# Patient Record
Sex: FEMALE | Race: WHITE | NOT HISPANIC OR LATINO | Employment: OTHER | URBAN - METROPOLITAN AREA
[De-identification: names, ages, dates, MRNs, and addresses within clinical notes are randomized per-mention and may not be internally consistent; named-entity substitution may affect disease eponyms.]

---

## 2017-01-13 ENCOUNTER — TELEPHONE (OUTPATIENT)
Dept: OPHTHALMOLOGY | Facility: CLINIC | Age: 67
End: 2017-01-13

## 2017-01-13 ENCOUNTER — OFFICE VISIT (OUTPATIENT)
Dept: OPHTHALMOLOGY | Facility: CLINIC | Age: 67
End: 2017-01-13
Payer: MEDICARE

## 2017-01-13 DIAGNOSIS — H52.7 REFRACTIVE ERROR: ICD-10-CM

## 2017-01-13 DIAGNOSIS — H25.13 NUCLEAR SCLEROSIS, BILATERAL: ICD-10-CM

## 2017-01-13 DIAGNOSIS — M31.6 GIANT CELL ARTERITIS: ICD-10-CM

## 2017-01-13 DIAGNOSIS — H43.811 PVD (POSTERIOR VITREOUS DETACHMENT), RIGHT: Primary | ICD-10-CM

## 2017-01-13 PROCEDURE — 99999 PR PBB SHADOW E&M-EST. PATIENT-LVL II: CPT | Mod: PBBFAC,,, | Performed by: OPHTHALMOLOGY

## 2017-01-13 PROCEDURE — 99212 OFFICE O/P EST SF 10 MIN: CPT | Mod: PBBFAC,PO | Performed by: OPHTHALMOLOGY

## 2017-01-13 PROCEDURE — 92014 COMPRE OPH EXAM EST PT 1/>: CPT | Mod: S$PBB,,, | Performed by: OPHTHALMOLOGY

## 2017-01-13 NOTE — PROGRESS NOTES
HPI     Urgent care requested Same Day access,   Started 2 weeks ago vision seems like she is looking thru cracked window   from right side of eye also with flashes of light right eye also black   spot , denies eye pain however od feels like it has a pressure to it. Does   not use any ey gtts currently.      Dx: Temperal Arteritis 2 years ago,  Not on Pred    Agree with above. Episodes lately - pressure in head, keeps building,   located R side lately, lasts couple days at least, nothing makes better or   worse. Reports scalp tenderness r side, denies jaw claudication. Denies   fever/weight changes. Pain other places - history of back and neck   surgeries. Couple weeks ago was sitting on porch with grandson, suddenly a   black spot appeared in eye like crack in windshield. Never went away but   has gotten better. Same night, saw a white light flash from upward to   downward, constant but sees predominantly at night.        Last edited by Syl Solomon MD on 1/13/2017  2:37 PM.     ROS     Negative for: Constitutional, Gastrointestinal, Neurological, Skin,   Genitourinary, Musculoskeletal, HENT, Endocrine, Cardiovascular, Eyes,   Respiratory, Psychiatric, Allergic/Imm, Heme/Lymph    Last edited by Syl Solomon MD on 1/13/2017  2:37 PM.   (History)        Assessment /Plan     For exam results, see Encounter Report.    PVD (posterior vitreous detachment), right    Nuclear sclerosis, bilateral    Giant cell arteritis    Refractive error          PVD (posterior vitreous detachment), right - Bella ring OD, no hole/tear noted, Mary sign negative. Re-check 4-6 weeks, sooner if symptoms worsen.    Nuclear sclerosis, bilateral - does not appear visually significant    Giant cell arteritis - Working to establish care - will send message to Dr. Erwin and Sandra Becker about perhaps ordering another ESR. Recent headaches/pressure.    Refractive error - MRx given, recommend wearing to  drive

## 2017-01-13 NOTE — Clinical Note
She is complaining of headache/pressure. States she has a history of giant cell arteritis confirmed on temporal artery biopsy. Should probably have an ESR. Eyes look fine.

## 2017-01-13 NOTE — MR AVS SNAPSHOT
[place] [facility]  [address]  [place] [address]  Phone: [phone]  Fax: [phone]                  Amanda Smith   2017 3:00 PM   Office Visit    Description:  Female : 1950   Provider:  Syl Solomon MD   Department:  [place] [facility]           Reason for Visit     Spots and/or Floaters           Diagnoses this Visit        Comments    PVD (posterior vitreous detachment), right    -  Primary     Nuclear sclerosis, bilateral         Giant cell arteritis         Refractive error                To Do List           Future Appointments        Provider Department Dept Phone    2017 3:00 PM Syl Solomon MD [facility] [phone]      Goals (5 Years of Data)     None      Follow-Up and Disposition     Return in about 4 weeks (around 2/10/2017) for 4-6 weeks DFE OD, f/u PVD.      [facility] On Call     [facility] Emergency Department Line - 24/7 Assistance  Registered nurses in the [facility] provide clinical advisement, health education, appointment booking, and other advisory services.  Call for this free service at [phone].             Medications           Message regarding Medications     Verify the changes and/or additions to your medication regime listed below are the same as discussed with your clinician today.  If any of these changes or additions are incorrect, please notify your healthcare provider.        STOP taking these medications     ranitidine (ZANTAC) 150 MG tablet TAKE 1/2 TABLET BY MOUTH TWICE A DAY AS NEEDED FOR HEARTBURN/UPSET STOMACH    predniSONE (DELTASONE) 20 MG tablet 2 tabs daily x 3 days, 1 tab daily x 3 days then 1/2 tab daily x 4 days    oxycodone-acetaminophen (PERCOCET)  mg per tablet     nitroGLYCERIN (NITROSTAT) 0.4 MG SL tablet Place 0.4 mg under the tongue every 5 (five) minutes as needed. Take 1 tablet if SBP >160    LYRICA 75 mg capsule Take 75 mg by mouth 2  (two) times daily.           Verify that the below list of medications is an accurate representation of the medications you are currently taking.  If none reported, the list may be blank. If incorrect, please contact your healthcare provider. Carry this list with you in case of emergency.           Current Medications     amlodipine (NORVASC) 5 MG tablet Take 2 tablets (10 mg total) by mouth once daily.    carvedilol (COREG) 12.5 MG tablet Take 1 tablet (12.5 mg total) by mouth 2 (two) times daily with meals.    gabapentin (NEURONTIN) 300 MG capsule     oxycodone (OXY-IR) 5 mg Cap Take 5 mg by mouth every 4 (four) hours as needed.    tizanidine (ZANAFLEX) 4 MG tablet every 8 (eight) hours.            Clinical Reference Information           Allergies as of 1/13/2017     Duloxetine    Ondansetron Hcl (Pf)    Sulfa (Sulfonamide Antibiotics)      Immunizations Administered on Date of Encounter - 1/13/2017     None      Smoking Cessation     If you would like to quit smoking:   You may be eligible for free services if you are a [place] resident and started smoking cigarettes before September 1, 1988.  Call the Smoking Cessation Trust (SCT) toll free at (585) 065-3547 or (483) 778-5435.   Call 6-934-QUIT-NOW if you do not meet the above criteria.

## 2017-01-13 NOTE — TELEPHONE ENCOUNTER
----- Message from Mayo Herrera sent at 1/13/2017  9:38 AM CST -----  Contact: self  Patient called in and last saw Dr. Solomon on 3/31/16.  Patient called in today 1/13 and stated she thinks she has a right inner eye bleed as she stated it looks like she is looking out of a cracked windshield.    Patient wanted to see if she could be squeezed in sooner than the next available appt which is end of February in the [place] area.    Patient call back number is [phone]

## 2017-01-17 ENCOUNTER — TELEPHONE (OUTPATIENT)
Dept: FAMILY MEDICINE | Facility: CLINIC | Age: 67
End: 2017-01-17

## 2017-01-17 DIAGNOSIS — R51.9 PRESSURE IN HEAD: Primary | ICD-10-CM

## 2017-01-17 NOTE — TELEPHONE ENCOUNTER
----- Message from Syl Solomon MD sent at 1/13/2017  2:41 PM CST -----  She is complaining of headache/pressure. States she has a history of giant cell arteritis confirmed on temporal artery biopsy. Should probably have an ESR. Eyes look fine.

## 2017-02-14 ENCOUNTER — TELEPHONE (OUTPATIENT)
Dept: OPHTHALMOLOGY | Facility: CLINIC | Age: 67
End: 2017-02-14

## 2017-03-12 DIAGNOSIS — I10 ESSENTIAL HYPERTENSION: ICD-10-CM

## 2017-03-12 RX ORDER — CARVEDILOL 12.5 MG/1
TABLET ORAL
Qty: 180 TABLET | Refills: 1 | Status: CANCELLED | OUTPATIENT
Start: 2017-03-12

## 2017-03-13 DIAGNOSIS — I10 ESSENTIAL HYPERTENSION: ICD-10-CM

## 2017-03-13 RX ORDER — AMLODIPINE BESYLATE 5 MG/1
TABLET ORAL
Qty: 180 TABLET | Refills: 1 | Status: CANCELLED | OUTPATIENT
Start: 2017-03-13

## 2017-03-20 DIAGNOSIS — I10 ESSENTIAL HYPERTENSION: ICD-10-CM

## 2017-03-22 RX ORDER — CARVEDILOL 12.5 MG/1
12.5 TABLET ORAL 2 TIMES DAILY WITH MEALS
Qty: 180 TABLET | Refills: 1 | Status: SHIPPED | OUTPATIENT
Start: 2017-03-22 | End: 2017-04-10 | Stop reason: SDUPTHER

## 2017-04-10 ENCOUNTER — OFFICE VISIT (OUTPATIENT)
Dept: FAMILY MEDICINE | Facility: CLINIC | Age: 67
End: 2017-04-10
Payer: MEDICARE

## 2017-04-10 ENCOUNTER — HOSPITAL ENCOUNTER (OUTPATIENT)
Dept: RADIOLOGY | Facility: HOSPITAL | Age: 67
Discharge: HOME | End: 2017-04-10
Attending: NURSE PRACTITIONER
Payer: MEDICARE

## 2017-04-10 VITALS
DIASTOLIC BLOOD PRESSURE: 80 MMHG | OXYGEN SATURATION: 97 % | TEMPERATURE: 99 F | HEIGHT: 63 IN | WEIGHT: 194 LBS | BODY MASS INDEX: 34.38 KG/M2 | HEART RATE: 84 BPM | SYSTOLIC BLOOD PRESSURE: 160 MMHG

## 2017-04-10 DIAGNOSIS — K21.9 GASTROESOPHAGEAL REFLUX DISEASE, ESOPHAGITIS PRESENCE NOT SPECIFIED: ICD-10-CM

## 2017-04-10 DIAGNOSIS — I10 ESSENTIAL HYPERTENSION: Primary | ICD-10-CM

## 2017-04-10 DIAGNOSIS — R73.01 ELEVATED FASTING BLOOD SUGAR: ICD-10-CM

## 2017-04-10 DIAGNOSIS — F17.200 SMOKER: ICD-10-CM

## 2017-04-10 DIAGNOSIS — G89.4 CHRONIC PAIN SYNDROME: ICD-10-CM

## 2017-04-10 DIAGNOSIS — M31.6 GIANT CELL ARTERITIS: ICD-10-CM

## 2017-04-10 DIAGNOSIS — D63.8 ANEMIA OF CHRONIC DISEASE: ICD-10-CM

## 2017-04-10 DIAGNOSIS — E66.9 OBESITY (BMI 30.0-34.9): ICD-10-CM

## 2017-04-10 DIAGNOSIS — N18.30 CKD (CHRONIC KIDNEY DISEASE) STAGE 3, GFR 30-59 ML/MIN: ICD-10-CM

## 2017-04-10 DIAGNOSIS — R05.9 COUGH: ICD-10-CM

## 2017-04-10 DIAGNOSIS — R19.7 DIARRHEA, UNSPECIFIED TYPE: ICD-10-CM

## 2017-04-10 DIAGNOSIS — G47.00 INSOMNIA, UNSPECIFIED TYPE: ICD-10-CM

## 2017-04-10 PROCEDURE — 71020 XR CHEST PA AND LATERAL: CPT | Mod: TC,PO

## 2017-04-10 PROCEDURE — 99999 PR PBB SHADOW E&M-EST. PATIENT-LVL IV: CPT | Mod: PBBFAC,,, | Performed by: NURSE PRACTITIONER

## 2017-04-10 PROCEDURE — 71020 XR CHEST PA AND LATERAL: CPT | Mod: 26,,, | Performed by: RADIOLOGY

## 2017-04-10 PROCEDURE — 99214 OFFICE O/P EST MOD 30 MIN: CPT | Mod: S$PBB,,, | Performed by: NURSE PRACTITIONER

## 2017-04-10 RX ORDER — ZOLPIDEM TARTRATE 5 MG/1
5 TABLET ORAL NIGHTLY PRN
Qty: 30 TABLET | Refills: 1 | Status: SHIPPED | OUTPATIENT
Start: 2017-04-10 | End: 2017-04-24

## 2017-04-10 RX ORDER — CARVEDILOL 12.5 MG/1
12.5 TABLET ORAL 2 TIMES DAILY WITH MEALS
Qty: 180 TABLET | Refills: 1 | Status: SHIPPED | OUTPATIENT
Start: 2017-04-10 | End: 2017-07-10 | Stop reason: SDUPTHER

## 2017-04-10 RX ORDER — OXYCODONE AND ACETAMINOPHEN 10; 325 MG/1; MG/1
1 TABLET ORAL 4 TIMES DAILY PRN
Refills: 0 | COMMUNITY
Start: 2017-03-28 | End: 2017-04-10

## 2017-04-10 RX ORDER — OXYCODONE AND ACETAMINOPHEN 10; 325 MG/1; MG/1
1 TABLET ORAL EVERY 4 HOURS PRN
COMMUNITY
End: 2018-03-01

## 2017-04-10 RX ORDER — AMLODIPINE BESYLATE 10 MG/1
10 TABLET ORAL DAILY
Qty: 90 TABLET | Refills: 1 | Status: SHIPPED | OUTPATIENT
Start: 2017-04-10 | End: 2018-03-12

## 2017-04-10 RX ORDER — OXYCODONE HYDROCHLORIDE 10 MG/1
10 TABLET, FILM COATED, EXTENDED RELEASE ORAL 2 TIMES DAILY
Refills: 0 | COMMUNITY
Start: 2017-01-28 | End: 2017-04-10

## 2017-04-10 NOTE — MR AVS SNAPSHOT
[facility]  [address]  [facility] [address]  Phone: [phone]  Fax: [phone]                  Amanda Smith   4/10/2017 10:40 AM   Office Visit    Description:  Female : 1950   Provider:  Reyna Weaver NP   Department:  [facility]           Reason for Visit     Diarrhea           Diagnoses this Visit        Comments    Essential hypertension    -  Primary     Diarrhea, unspecified type         CKD (chronic kidney disease) stage 3, GFR 30-59 ml/min         Obesity (BMI 30.0-34.9)         Anemia of chronic disease         Chronic pain syndrome         Gastroesophageal reflux disease, esophagitis presence not specified         Elevated fasting blood sugar         Insomnia, unspecified type         Giant cell arteritis         Cough         Smoker                To Do List           Future Appointments        Provider Department Dept Phone    4/10/2017 11:55 AM LAB, [place] [facility] [phone]    4/10/2017 3:45 PM [facility] XR2 [facility] [phone]    2017 10:00 AM Reyna Weaver NP [facility] [phone]      Goals (5 Years of Data)     None      Follow-Up and Disposition     Return in about 2 weeks (around 2017).       These Medications        Disp Refills Start End    ranitidine (ZANTAC) 150 MG tablet 180 tablet 3 4/10/2017 4/10/2018    Take 1 tablet (150 mg total) by mouth 2 (two) times daily. - Oral    Pharmacy: [facility]/pharmacy #[id number] [place] [address] Ph #: [phone]       carvedilol (COREG) 12.5 MG tablet 180 tablet 1 4/10/2017     Take 1 tablet (12.5 mg total) by mouth 2 (two) times daily with meals. - Oral    Pharmacy: [facility]/pharmacy #[id number] [place] [address] Ph #: [phone]       amlodipine (NORVASC) 10 MG tablet 90 tablet 1 4/10/2017 4/10/2018    Take 1 tablet (10 mg total) by mouth once daily. - Oral    Pharmacy:  [facility]/pharmacy #[id number]  [place] #: [phone]       zolpidem (AMBIEN) 5 MG Tab 30 tablet 1 4/10/2017 10/9/2017    Take 1 tablet (5 mg total) by mouth nightly as needed. - Oral    Pharmacy: [facility]/pharmacy #[id number]  [place] #: [phone]         [facility]     [facility] On Call Nurse Care Line - 24/7 Assistance  Unless otherwise directed by your provider, please contact [facility], our nurse care line that is available for 24/7 assistance.     Registered nurses in the [facility] provide: appointment scheduling, clinical advisement, health education, and other advisory services.  Call: [phone] (toll free)               Medications           Message regarding Medications     Verify the changes and/or additions to your medication regime listed below are the same as discussed with your clinician today.  If any of these changes or additions are incorrect, please notify your healthcare provider.        START taking these NEW medications        Refills    ranitidine (ZANTAC) 150 MG tablet 3    Sig: Take 1 tablet (150 mg total) by mouth 2 (two) times daily.    Class: Normal    Route: Oral    amlodipine (NORVASC) 10 MG tablet 1    Sig: Take 1 tablet (10 mg total) by mouth once daily.    Class: Normal    Route: Oral    zolpidem (AMBIEN) 5 MG Tab 1    Sig: Take 1 tablet (5 mg total) by mouth nightly as needed.    Class: Normal    Route: Oral      STOP taking these medications     oxycodone-acetaminophen (PERCOCET)  mg per tablet Take 1 tablet by mouth 4 (four) times daily as needed.    oxycodone (OXY-IR) 5 mg Cap Take 5 mg by mouth every 4 (four) hours as needed.    OXYCONTIN 10 mg 12 hr tablet Take 10 mg by mouth 2 (two) times daily.           Verify that the below list of medications is an accurate representation of the medications you are currently taking.  If none reported, the list may be blank. If incorrect, please contact your healthcare  "provider. Carry this list with you in case of emergency.           Current Medications     carvedilol (COREG) 12.5 MG tablet Take 1 tablet (12.5 mg total) by mouth 2 (two) times daily with meals.    gabapentin (NEURONTIN) 300 MG capsule     tizanidine (ZANAFLEX) 4 MG tablet every 8 (eight) hours.     amlodipine (NORVASC) 10 MG tablet Take 1 tablet (10 mg total) by mouth once daily.    ranitidine (ZANTAC) 150 MG tablet Take 1 tablet (150 mg total) by mouth 2 (two) times daily.    zolpidem (AMBIEN) 5 MG Tab Take 1 tablet (5 mg total) by mouth nightly as needed.           Clinical Reference Information           Your Vitals Were     BP Pulse Temp Height Weight SpO2    160/80 84 98.8 °F (37.1 °C) (Oral) 5' 3" (1.6 m) 88 kg (194 lb 0.1 oz) 97%    BMI                34.37 kg/m2          Blood Pressure          Most Recent Value    BP  (!)  160/80      Allergies as of 4/10/2017     Duloxetine    Ondansetron Hcl (Pf)    Sulfa (Sulfonamide Antibiotics)      Immunizations Administered on Date of Encounter - 4/10/2017     None      Orders Placed During Today's Visit      Normal Orders This Visit    Ambulatory referral to Gastroenterology     Microalbumin/creatinine urine ratio     Future Labs/Procedures Expected by Expires    CBC auto differential  4/10/2017 6/9/2018    Clostridium difficile EIA  4/10/2017 6/9/2018    Comprehensive metabolic panel  4/10/2017 6/9/2018    CULTURE, STOOL  4/10/2017 4/10/2018    Giardia / Cryptosporidum, EIA  4/10/2017 6/9/2018    Hemoglobin A1c  4/10/2017 6/9/2018    Sedimentation rate, manual  4/10/2017 6/9/2018    Stool Exam-Ova,Cysts,Parasites  4/10/2017 4/10/2018    X-Ray Chest PA And Lateral  4/10/2017 4/10/2018      Smoking Cessation     If you would like to quit smoking:   You may be eligible for free services if you are a [place] resident and started smoking cigarettes before September 1, 1988.  Call the Smoking Cessation Trust (SCT) toll free at (977) 876-4727 or (680) " 887-1694.   Call 1-800-QUIT-NOW if you do not meet the above criteria.   Contact us via email: [email or web address]   View our website for more information: [email or web address]        Language Assistance Services     ATTENTION: Language assistance services are available, free of charge. Please call [phone].      ATENCIÓN: Si habla español, tiene a bal disposición servicios gratuitos de asistencia lingüística. Llame al [phone].     CHÚ Ý: N?u b?n nói Ti?ng Vi?t, có các d?ch v? h? tr? ngôn ng? mi?n phí dành cho b?n. G?i s? [phone].         [facility] complies with applicable Federal civil rights laws and does not discriminate on the basis of race, color, national origin, age, disability, or sex.

## 2017-04-10 NOTE — PROGRESS NOTES
Subjective:       Patient ID: Amanda Smith is a 66 y.o. female.    Chief Complaint: Diarrhea (vomitting and diarrhea since 2 fridays ago. Really bad cough for 5 weeks,phlem (green) coming up. States everybody in her house had the flu and URI. Needs med refills too)    HPI Comments: Third time this year that she has had vomiting and nausea and diarrhea- 3 times with these episodes since April   Then they stop and everything goes back to normal - she has weakness with all these episodes - but she is able to eat during these epiosdes with bland foods   Takes pepto - has had black stools with this   Takes few days to pass        Had taken amoxicillin - BID for 7 days - 3 weeks ago     Reviewed EGD 2008 - Erosive gastritis and esophagitis, duodenitis   Colonoscopy 2008 - polyp and internal hemorrhoids - reviewed           Diarrhea    This is a new problem. The current episode started 1 to 4 weeks ago (went for 7 days - then stopped 3 days ago ). The problem occurs more than 10 times per day. The problem has been rapidly worsening. The stool consistency is described as watery. Associated symptoms include abdominal pain, coughing, a fever, a URI and vomiting. Pertinent negatives include no arthralgias, bloating, chills, headaches, increased  flatus, myalgias, sweats or weight loss. Associated symptoms comments: 99.5. Risk factors include ill contacts. There is no history of bowel resection, inflammatory bowel disease, irritable bowel syndrome, malabsorption, a recent abdominal surgery or short gut syndrome.   Cough   This is a new problem. The current episode started more than 1 month ago. The problem has been gradually worsening. The cough is productive of sputum and productive of purulent sputum. Associated symptoms include a fever and shortness of breath. Pertinent negatives include no chest pain, chills, ear congestion, ear pain, headaches, heartburn, hemoptysis, myalgias, nasal congestion, postnasal drip,  rash, rhinorrhea, sore throat, sweats, weight loss or wheezing. Treatments tried: cough drops, cough meds. The treatment provided no relief. There is no history of asthma, bronchiectasis, bronchitis, COPD, emphysema, environmental allergies or pneumonia.     Vitals:    04/10/17 1042   BP: (!) 160/80   Pulse: 84   Temp: 98.8 °F (37.1 °C)     Review of Systems   Constitutional: Positive for fever. Negative for chills and weight loss.   HENT: Positive for congestion. Negative for ear pain, postnasal drip, rhinorrhea and sore throat.    Eyes: Negative.    Respiratory: Positive for cough and shortness of breath. Negative for hemoptysis and wheezing.    Cardiovascular: Negative.  Negative for chest pain.   Gastrointestinal: Positive for abdominal pain, diarrhea, nausea and vomiting. Negative for bloating, blood in stool, flatus and heartburn.   Endocrine: Negative.    Genitourinary: Negative.    Musculoskeletal: Negative.  Negative for arthralgias and myalgias.   Skin: Negative for rash.   Allergic/Immunologic: Negative.  Negative for environmental allergies.   Neurological: Negative.  Negative for headaches.   Psychiatric/Behavioral: Negative.        Past Medical History:   Diagnosis Date    Anemia of chronic disease 11/30/2015    Anxiety     Blood transfusion     Cataract     Chronic kidney disease     Depression     Fibromyalgia     GERD (gastroesophageal reflux disease)     Hypertension     Kidney stones     Renal failure     Temporal arteritis      Objective:      Physical Exam   Constitutional: She is oriented to person, place, and time. She appears well-developed and well-nourished.   HENT:   Head: Normocephalic and atraumatic.   Mouth/Throat: Oropharynx is clear and moist.   Eyes: Conjunctivae and EOM are normal. Pupils are equal, round, and reactive to light.   Neck: Neck supple.   Cardiovascular: Normal rate, regular rhythm, normal heart sounds and intact distal pulses.  Exam reveals no friction rub.     No murmur heard.  Pulmonary/Chest: Effort normal. No respiratory distress. She has decreased breath sounds in the right lower field and the left lower field. She has no wheezes.   Abdominal: Soft. Bowel sounds are normal. She exhibits no distension.   Musculoskeletal: Normal range of motion.   Neurological: She is alert and oriented to person, place, and time.   Skin: Skin is warm and dry.   Psychiatric: She has a normal mood and affect. Her behavior is normal. Judgment and thought content normal.   Nursing note and vitals reviewed.      Assessment:       1. Essential hypertension    2. Diarrhea, unspecified type    3. CKD (chronic kidney disease) stage 3, GFR 30-59 ml/min    4. Obesity (BMI 30.0-34.9)    5. Anemia of chronic disease    6. Chronic pain syndrome    7. Gastroesophageal reflux disease, esophagitis presence not specified    8. Elevated fasting blood sugar    9. Insomnia, unspecified type    10. Giant cell arteritis        Plan:       Essential hypertension  -     CBC auto differential; Future; Expected date: 4/10/17  -     Comprehensive metabolic panel; Future; Expected date: 4/10/17  -     Microalbumin/creatinine urine ratio  -     carvedilol (COREG) 12.5 MG tablet; Take 1 tablet (12.5 mg total) by mouth 2 (two) times daily with meals.  Dispense: 180 tablet; Refill: 1  -     amlodipine (NORVASC) 10 MG tablet; Take 1 tablet (10 mg total) by mouth once daily.  Dispense: 90 tablet; Refill: 1    Diarrhea, unspecified type  -     Giardia / Cryptosporidum, EIA; Future; Expected date: 4/10/17  -     CULTURE, STOOL; Future; Expected date: 4/10/17  -     Stool Exam-Ova,Cysts,Parasites; Future; Expected date: 4/10/17  -     Clostridium difficile EIA; Future; Expected date: 4/10/17  -     Ambulatory referral to Gastroenterology    CKD (chronic kidney disease) stage 3, GFR 30-59 ml/min  -     Comprehensive metabolic panel; Future; Expected date: 4/10/17  -     Microalbumin/creatinine urine ratio    Obesity (BMI  30.0-34.9)  -     CBC auto differential; Future; Expected date: 4/10/17  -     Microalbumin/creatinine urine ratio    Anemia of chronic disease  -     CBC auto differential; Future; Expected date: 4/10/17    Chronic pain syndrome  Taking oxycodone - per pain mgt #120 per month - per  report - she states that she uses 2 a day    Gastroesophageal reflux disease, esophagitis presence not specified  -     ranitidine (ZANTAC) 150 MG tablet; Take 1 tablet (150 mg total) by mouth 2 (two) times daily.  Dispense: 180 tablet; Refill: 3    Elevated fasting blood sugar  -     Hemoglobin A1c; Future; Expected date: 4/10/17    Insomnia, unspecified type  -     zolpidem (AMBIEN) 5 MG Tab; Take 1 tablet (5 mg total) by mouth nightly as needed.  Dispense: 30 tablet; Refill: 1    Giant cell arteritis  -     Sedimentation rate, manual; Future; Expected date: 4/10/17          Return in about 2 weeks (around 4/24/2017).

## 2017-04-12 ENCOUNTER — TELEPHONE (OUTPATIENT)
Dept: FAMILY MEDICINE | Facility: CLINIC | Age: 67
End: 2017-04-12

## 2017-04-12 DIAGNOSIS — N28.9 WORSENING RENAL FUNCTION: Primary | ICD-10-CM

## 2017-04-12 DIAGNOSIS — N18.9 CKD (CHRONIC KIDNEY DISEASE), UNSPECIFIED STAGE: ICD-10-CM

## 2017-04-12 NOTE — TELEPHONE ENCOUNTER
Informed patient of results. Patient would like to see Dr Agus Weathers and will call back for referral if she is able to make appointment, if not she will see Dr Valente.

## 2017-04-12 NOTE — TELEPHONE ENCOUNTER
Please set her up with renal doc- her kidney function is worsening and needs to be monitored   Ask her if she is going to bring in stool samples that I ordered     She is seeing me in 2 weeks and then needs to reestablish with new pcp in 2 months for follow up

## 2017-04-13 ENCOUNTER — TELEPHONE (OUTPATIENT)
Dept: FAMILY MEDICINE | Facility: CLINIC | Age: 67
End: 2017-04-13

## 2017-04-13 NOTE — TELEPHONE ENCOUNTER
----- Message from Cathy Rebollar sent at 4/13/2017 11:05 AM CDT -----  Patient is calling to get a name for the doctor in Nephrology that office wants her to see. Please call back at [phone].

## 2017-04-13 NOTE — TELEPHONE ENCOUNTER
----- Message from Vida Tafoya sent at 4/13/2017  9:04 AM CDT -----  Pt called stated that she need a referral letter to a Nephrologist/pls call back at [phone]

## 2017-04-13 NOTE — TELEPHONE ENCOUNTER
Informed patient referral entered.   She states Dr. Weathers was recommended to her but she will also see Dr. Valente if needed. Pt to contact Dr. Weathers's office to schedule.

## 2017-04-18 ENCOUNTER — TELEPHONE (OUTPATIENT)
Dept: FAMILY MEDICINE | Facility: CLINIC | Age: 67
End: 2017-04-18

## 2017-04-18 NOTE — TELEPHONE ENCOUNTER
----- Message from Vida Tafoya sent at 4/18/2017  8:14 AM CDT -----  Pt called stated that she's suppose to bring a specimen and wanted to know if its ok if she brings it om 4/24 when she has an appt because she's not able to drive right now/pls call back at [phone]

## 2017-04-20 ENCOUNTER — TELEPHONE (OUTPATIENT)
Dept: FAMILY MEDICINE | Facility: CLINIC | Age: 67
End: 2017-04-20

## 2017-04-20 DIAGNOSIS — R11.2 NAUSEA AND VOMITING, INTRACTABILITY OF VOMITING NOT SPECIFIED, UNSPECIFIED VOMITING TYPE: Primary | ICD-10-CM

## 2017-04-20 RX ORDER — PROMETHAZINE HYDROCHLORIDE 12.5 MG/1
12.5 TABLET ORAL EVERY 8 HOURS PRN
Qty: 8 TABLET | Refills: 0 | Status: SHIPPED | OUTPATIENT
Start: 2017-04-20 | End: 2017-05-23

## 2017-04-20 NOTE — TELEPHONE ENCOUNTER
Patient states she began vomiting last night and today, she thnks it is a virus. States she is urinating. States in the past when she gets sick with vomiting and diarrhea she ends up getting dehydrated and gets placed in hospital from issues with her kidneys . Says she has taken phenergan in the past.

## 2017-04-20 NOTE — TELEPHONE ENCOUNTER
I saw her for diarrhea - dont see any documentation or discussion of nausea/vomitting - and there is nothing on her med list for this   Please call her and ask about why she feels that she will get dehydrated  Is she tolerating fluids  Is she voiding   What does she take for nausea

## 2017-04-20 NOTE — TELEPHONE ENCOUNTER
----- Message from Marybeth Cartwright sent at 4/20/2017  9:06 AM CDT -----  Contact: patient  Patient calling to request medication for nausea. She is scared that she may start vomiting and be dehydrated again. She is allergic to Zofran.  Please advise.  Call back .  Thanks!  [facility]/pharmacy #[id number] - [place] - [address] [address]  [address] [address]  Phone: [phone] Fax: [phone]

## 2017-04-24 ENCOUNTER — OFFICE VISIT (OUTPATIENT)
Dept: NEPHROLOGY | Facility: CLINIC | Age: 67
End: 2017-04-24
Payer: MEDICARE

## 2017-04-24 ENCOUNTER — LAB VISIT (OUTPATIENT)
Dept: LAB | Facility: HOSPITAL | Age: 67
End: 2017-04-24
Attending: NURSE PRACTITIONER
Payer: MEDICARE

## 2017-04-24 VITALS
OXYGEN SATURATION: 98 % | BODY MASS INDEX: 33.91 KG/M2 | DIASTOLIC BLOOD PRESSURE: 79 MMHG | HEIGHT: 63 IN | HEART RATE: 78 BPM | SYSTOLIC BLOOD PRESSURE: 164 MMHG | WEIGHT: 191.38 LBS | TEMPERATURE: 99 F

## 2017-04-24 DIAGNOSIS — R19.7 DIARRHEA, UNSPECIFIED TYPE: ICD-10-CM

## 2017-04-24 DIAGNOSIS — N18.30 CKD (CHRONIC KIDNEY DISEASE) STAGE 3, GFR 30-59 ML/MIN: Primary | ICD-10-CM

## 2017-04-24 PROCEDURE — 87046 STOOL CULTR AEROBIC BACT EA: CPT | Mod: 59

## 2017-04-24 PROCEDURE — 99204 OFFICE O/P NEW MOD 45 MIN: CPT | Mod: S$PBB,,, | Performed by: INTERNAL MEDICINE

## 2017-04-24 PROCEDURE — 87427 SHIGA-LIKE TOXIN AG IA: CPT

## 2017-04-24 PROCEDURE — 87329 GIARDIA AG IA: CPT

## 2017-04-24 PROCEDURE — 87209 SMEAR COMPLEX STAIN: CPT

## 2017-04-24 PROCEDURE — 87045 FECES CULTURE AEROBIC BACT: CPT

## 2017-04-24 PROCEDURE — 99999 PR PBB SHADOW E&M-EST. PATIENT-LVL III: CPT | Mod: PBBFAC,,, | Performed by: INTERNAL MEDICINE

## 2017-04-24 RX ORDER — MORPHINE SULFATE 15 MG/1
15 TABLET, FILM COATED, EXTENDED RELEASE ORAL 2 TIMES DAILY
Refills: 0 | COMMUNITY
Start: 2017-04-10 | End: 2017-07-10

## 2017-04-24 NOTE — LETTER
April 24, 2017      Reyna Weaver, NP  [address] [facility] [address] [place] [address]           [place] [facility]  [address] [place] [address]  Phone: [phone]          Patient: Amanda Smith   MR Number: [id number]   YOB: 1950   Date of Visit: 4/24/2017       Dear Reyna Weaver:    Thank you for referring Amanda Smith to me for evaluation. Attached you will find relevant portions of my assessment and plan of care.    If you have questions, please do not hesitate to call me. I look forward to following Amanda Smith along with you.    Sincerely,    Shailesh Valente MD    Enclosure  CC:  No Recipients    If you would like to receive this communication electronically, please contact [email or web address] or [phone] to request more information on [facility] Link access.    For providers and/or their staff who would like to refer a patient to [facility], please contact us through our one-stop-shop provider referral line, [facility], at [phone].    If you feel you have received this communication in error or would no longer like to receive these types of communications, please e-mail [email or web address]

## 2017-04-24 NOTE — MR AVS SNAPSHOT
[facility]  [address] [facility] [address]  [facility] [address]  Phone: [phone]                  Amanda Smith   2017 2:40 PM   Office Visit    Description:  Female : 1950   Provider:  Shailesh Valente MD   Department:  [facility]           Diagnoses this Visit        Comments    CKD (chronic kidney disease) stage 3, GFR 30-59 ml/min    -  Primary            To Do List           Future Appointments        Provider Department Dept Phone    2017 10:50 AM URINE [facility] [phone]    2017 10:55 AM LAB, [place] [facility] [phone]    2017 11:30 AM NSMH US2 [facility] [phone]    2017 2:00 PM Reyna Weaver NP [facility] [phone]    2017 2:00 PM Shailesh Valente MD [facility] [phone]      Goals (5 Years of Data)     None      Follow-Up and Disposition     Return in about 4 months (around 2017).    Follow-up and Disposition History      [facility]     [facility] Nurse Care Line -  Assistance  Unless otherwise directed by your provider, please contact [facility], our nurse care line that is available for  assistance.     Registered nurses in the [facility] provide: appointment scheduling, clinical advisement, health education, and other advisory services.  Call: [phone] (toll free)               Medications           Message regarding Medications     Verify the changes and/or additions to your medication regime listed below are the same as discussed with your clinician today.  If any of these changes or additions are incorrect, please notify your healthcare provider.        STOP taking these medications     ranitidine (ZANTAC) 150 MG tablet Take 1 tablet (150 mg total) by mouth 2 (two) times daily.    zolpidem (AMBIEN) 5 MG Tab Take 1 tablet (5 mg total) by mouth nightly as needed.          "  Verify that the below list of medications is an accurate representation of the medications you are currently taking.  If none reported, the list may be blank. If incorrect, please contact your healthcare provider. Carry this list with you in case of emergency.           Current Medications     amlodipine (NORVASC) 10 MG tablet Take 1 tablet (10 mg total) by mouth once daily.    carvedilol (COREG) 12.5 MG tablet Take 1 tablet (12.5 mg total) by mouth 2 (two) times daily with meals.    gabapentin (NEURONTIN) 300 MG capsule Take 600 mg by mouth 3 (three) times daily.     morphine (MS CONTIN) 15 MG 12 hr tablet Take 15 mg by mouth 2 (two) times daily.    oxycodone-acetaminophen (PERCOCET)  mg per tablet Take 1 tablet by mouth every 4 (four) hours as needed for Pain.    promethazine (PHENERGAN) 12.5 MG Tab Take 1 tablet (12.5 mg total) by mouth every 8 (eight) hours as needed.    tizanidine (ZANAFLEX) 4 MG tablet every 8 (eight) hours.            Clinical Reference Information           Your Vitals Were     BP Pulse Temp Height Weight SpO2    164/79 78 98.5 °F (36.9 °C) 5' 3" (1.6 m) 86.8 kg (191 lb 5.8 oz) 98%    BMI                33.9 kg/m2          Blood Pressure          Most Recent Value    BP  (!)  164/79      Allergies as of 4/24/2017     Duloxetine    Ondansetron Hcl (Pf)    Sulfa (Sulfonamide Antibiotics)      Immunizations Administered on Date of Encounter - 4/24/2017     None      Orders Placed During Today's Visit     Future Labs/Procedures Expected by Expires    Protein / creatinine ratio, urine  5/1/2017 12/31/2017    PTH, intact  5/1/2017 12/31/2017    Renal function panel  5/1/2017 12/31/2017    Urinalysis  5/1/2017 12/31/2017    US Retroperitoneal Complete (Kidney and  5/1/2017 12/31/2017      Smoking Cessation     If you would like to quit smoking:   You may be eligible for free services if you are a [place] resident and started smoking cigarettes before September 1, 1988.  Call the Smoking " [facility] ([facility]) toll free at (039) [phone] or (249) 246-5151.   Call 1-800-QUIT-NOW if you do not meet the above criteria.   Contact us via email: [email or web address]   View our website for more information: [email or web address]        Language Assistance Services     ATTENTION: Language assistance services are available, free of charge. Please call [phone].      ATENCIÓN: Si habla allisonañol, tiene a bal disposición servicios gratuitos de asistencia lingüística. Llame al [phone].     CHÚ Ý: N?u b?n nói Ti?ng Vi?t, có các d?ch v? h? tr? ngôn ng? mi?n phí dành cho b?n. G?i s? [phone].         [facility] complies with applicable Federal civil rights laws and does not discriminate on the basis of race, color, national origin, age, disability, or sex.

## 2017-04-24 NOTE — MR AVS SNAPSHOT
[facility]  [address]  [facility] [address]  Phone: [phone]                  Amanda Smith   2017 2:40 PM   Office Visit    Description:  Female : 1950   Provider:  Shailesh Valente MD   Department:  [facility]                To Do List           Future Appointments        Provider Department Dept Phone    2017 3:20 PM [facility], [place] [facility] [phone]    2017 10:00 AM Reyna Weaver NP [facility] [phone]      Goals (5 Years of Data)     None      [facility]     [facility] -  Assistance  Registered nurses in the [facility] provide clinical advisement, health education, appointment booking, and other advisory services  Call for this free service at [phone].                 Medications           Message regarding Medications     Verify the changes and/or additions to your medication regime listed below are the same as discussed with your clinician today.  If any of these changes or additions are incorrect, please notify your healthcare provider.        STOP taking these medications     ranitidine (ZANTAC) 150 MG tablet Take 1 tablet (150 mg total) by mouth 2 (two) times daily.    zolpidem (AMBIEN) 5 MG Tab Take 1 tablet (5 mg total) by mouth nightly as needed.           Verify that the below list of medications is an accurate representation of the medications you are currently taking.  If none reported, the list may be blank. If incorrect, please contact your healthcare provider. Carry this list with you in case of emergency.           Current Medications     amlodipine (NORVASC) 10 MG tablet Take 1 tablet (10 mg total) by mouth once daily.    carvedilol (COREG) 12.5 MG tablet Take 1 tablet (12.5 mg total) by mouth 2 (two) times daily with meals.    gabapentin (NEURONTIN) 300 MG capsule Take 600 mg by mouth 3 (three) times daily.     morphine  "(MS CONTIN) 15 MG 12 hr tablet Take 15 mg by mouth 2 (two) times daily.    oxycodone-acetaminophen (PERCOCET)  mg per tablet Take 1 tablet by mouth every 4 (four) hours as needed for Pain.    promethazine (PHENERGAN) 12.5 MG Tab Take 1 tablet (12.5 mg total) by mouth every 8 (eight) hours as needed.    tizanidine (ZANAFLEX) 4 MG tablet every 8 (eight) hours.            Clinical Reference Information           Vital Signs - Last Recorded  Most recent update: 4/24/2017  3:05 PM by Hoa Benito    BP Pulse Temp Ht Wt SpO2    (!) 190/86 (BP Location: Right arm, Patient Position: Sitting) 78 98.5 °F (36.9 °C) 5' 3" (1.6 m) 86.8 kg (191 lb 5.8 oz) 98%    BMI                33.9 kg/m2          Blood Pressure          Most Recent Value    BP  (!)  190/86      Allergies as of 4/24/2017     Duloxetine    Ondansetron Hcl (Pf)    Sulfa (Sulfonamide Antibiotics)      Immunizations Administered on Date of Encounter - 4/24/2017     None      Translation Services Information     ATTENTION: Language assistance services are available, free of charge. Please call [phone].    ATENCIÓN: Si ezio lobo, tiene a bal disposición servicios gratuitos de asistencia lingüística. Llame al [phone].     CHÚ Ý: N?u b?n nói Ti?ng Vi?t, có các d?ch v? h? tr? ngôn ng? mi?n phí dành cho b?n. G?i s? [phone].        Smoking Cessation     If you would like to quit smoking:   You may be eligible for free services if you are a [place] resident and started smoking cigarettes before September 1, 1988.  Call the Smoking [employer]essation [employer] ([employer]) toll free at (728) 500-8432 or (121) 118-7419.   Call 1-800-QUIT-NOW if you do not meet the above criteria.            [facility] complies with applicable Federal civil rights laws and does not discriminate on the basis of race, color, national origin, age, disability, or sex.        "

## 2017-04-24 NOTE — PROGRESS NOTES
Subjective:       Patient ID: Amanda Smith is a 66 y.o. White female who presents for new patient evaluation for chronic renal failure.    Amanda Smith is referred by Castillo Erwin MD to be evaluated for chronic renal failure.  She gives a history of knowing that she has a history of renal failure and has been on dialysis for four treatments in the past.  She has had fairly stable kidney function for the past several months.  She report that she had been taking aleve on a very regular basis      Review of Systems   Constitutional: Positive for appetite change, fatigue and unexpected weight change. Negative for chills and fever.   Eyes: Negative for visual disturbance.   Respiratory: Positive for shortness of breath. Negative for cough.    Cardiovascular: Positive for leg swelling. Negative for chest pain.   Gastrointestinal: Positive for abdominal pain. Negative for diarrhea, nausea and vomiting.   Genitourinary: Positive for decreased urine volume. Negative for difficulty urinating, dysuria and hematuria.   Musculoskeletal: Positive for arthralgias and back pain.   Skin: Positive for color change. Negative for rash.   Neurological: Positive for headaches.   Hematological: Bruises/bleeds easily.   Psychiatric/Behavioral: Positive for sleep disturbance.         Past Medical History:   Diagnosis Date    Anemia of chronic disease 2015    Anxiety     Blood transfusion     Cataract     Chronic kidney disease     Depression     Fibromyalgia     GERD (gastroesophageal reflux disease)     Hypertension     Kidney stones     Renal failure     Temporal arteritis      Past Surgical History:   Procedure Laterality Date    BACK SURGERY      x5     Brest tumor removal      cervical fusion\       SECTION      CHOLECYSTECTOMY      HYSTERECTOMY      MRSA      neck and arm - mosquito bite      Social History     Social History    Marital status:      Spouse name: N/A     "Number of children: N/A    Years of education: N/A     Occupational History    Not on file.     Social History Main Topics    Smoking status: Smoker, Current Status Unknown     Packs/day: 1.00     Years: 45.00     Types: Cigarettes    Smokeless tobacco: Never Used      Comment: 1 pack a week     Alcohol use No    Drug use: No    Sexual activity: Not Currently     Other Topics Concern    Not on file     Social History Narrative     The patient has a family history of  Current Outpatient Prescriptions   Medication Sig    amlodipine (NORVASC) 10 MG tablet Take 1 tablet (10 mg total) by mouth once daily.    carvedilol (COREG) 12.5 MG tablet Take 1 tablet (12.5 mg total) by mouth 2 (two) times daily with meals.    gabapentin (NEURONTIN) 300 MG capsule Take 600 mg by mouth 3 (three) times daily.     morphine (MS CONTIN) 15 MG 12 hr tablet Take 15 mg by mouth 2 (two) times daily.    oxycodone-acetaminophen (PERCOCET)  mg per tablet Take 1 tablet by mouth every 4 (four) hours as needed for Pain.    promethazine (PHENERGAN) 12.5 MG Tab Take 1 tablet (12.5 mg total) by mouth every 8 (eight) hours as needed.    tizanidine (ZANAFLEX) 4 MG tablet every 8 (eight) hours.      No current facility-administered medications for this visit.        BP (!) 164/79  Pulse 78  Temp 98.5 °F (36.9 °C)  Ht 5' 3" (1.6 m)  Wt 86.8 kg (191 lb 5.8 oz)  SpO2 98%  BMI 33.9 kg/m2    Objective:      Physical Exam   Constitutional: She is oriented to person, place, and time. She appears well-developed and well-nourished. No distress.   HENT:   Head: Normocephalic and atraumatic.   Eyes: Conjunctivae are normal.   Neck: Neck supple. No JVD present.   Cardiovascular: Normal rate, regular rhythm and normal heart sounds.  Exam reveals no gallop and no friction rub.    No murmur heard.  Pulmonary/Chest: Effort normal and breath sounds normal. No respiratory distress. She has no wheezes. She has no rales.   Abdominal: Soft. Bowel " sounds are normal. She exhibits no distension (obese). There is no tenderness.   Musculoskeletal: She exhibits no edema.   Neurological: She is alert and oriented to person, place, and time.   Skin: Skin is warm and dry. No rash noted.   Psychiatric: She has a normal mood and affect.   Vitals reviewed.      Assessment:       1. CKD (chronic kidney disease) stage 3, GFR 30-59 ml/min        Plan:   Return to clinic in 4 months.  Labs for next week include rp, pth, ua, upc, renal panel with PVR.  Baseline creatinine is 1.7 since 2012.  Please avoid or minimize all NSAIDS (ibuprofen, motrin, aleve, indocin, naprosyn) to minimize the risk to your kidneys.  Aspirin in a dose of 325 mg or less a day is likely the safest with regards to kidney function.  If you are able to take aspirin and do not have any bleeding problems or ulcers, this may be your best therapy.  Alternatively, acetaminophen (Tylenol) is the safer than NSAIDs with regards to kidney function.  I would ask you take this as directed on the bottle.  It is best to stay under 2 grams a day (4-500 mg tablets a day maximum).  She has multiple symptoms c/w advanced CKD but has reasonable function with stable function.  I will do a directed evaluation and reassess her.

## 2017-04-25 LAB
CRYPTOSP AG STL QL IA: NEGATIVE
E COLI SXT1 STL QL IA: NEGATIVE
E COLI SXT2 STL QL IA: NEGATIVE
G LAMBLIA AG STL QL IA: NEGATIVE
O+P STL TRI STN: NORMAL

## 2017-04-27 LAB — BACTERIA STL CULT: NORMAL

## 2017-05-23 ENCOUNTER — HOSPITAL ENCOUNTER (OUTPATIENT)
Dept: RADIOLOGY | Facility: HOSPITAL | Age: 67
Discharge: HOME | End: 2017-05-23
Attending: INTERNAL MEDICINE
Payer: MEDICARE

## 2017-05-23 ENCOUNTER — OFFICE VISIT (OUTPATIENT)
Dept: FAMILY MEDICINE | Facility: CLINIC | Age: 67
End: 2017-05-23
Payer: MEDICARE

## 2017-05-23 VITALS
BODY MASS INDEX: 33.94 KG/M2 | OXYGEN SATURATION: 99 % | DIASTOLIC BLOOD PRESSURE: 90 MMHG | TEMPERATURE: 98 F | SYSTOLIC BLOOD PRESSURE: 160 MMHG | HEIGHT: 63 IN | WEIGHT: 191.56 LBS | HEART RATE: 65 BPM

## 2017-05-23 DIAGNOSIS — E66.9 OBESITY (BMI 30.0-34.9): ICD-10-CM

## 2017-05-23 DIAGNOSIS — G47.00 INSOMNIA, UNSPECIFIED TYPE: ICD-10-CM

## 2017-05-23 DIAGNOSIS — R11.15 INTRACTABLE CYCLICAL VOMITING WITH NAUSEA: Primary | ICD-10-CM

## 2017-05-23 DIAGNOSIS — G89.4 CHRONIC PAIN SYNDROME: ICD-10-CM

## 2017-05-23 DIAGNOSIS — N18.30 CKD (CHRONIC KIDNEY DISEASE) STAGE 3, GFR 30-59 ML/MIN: ICD-10-CM

## 2017-05-23 DIAGNOSIS — N18.9 CKD (CHRONIC KIDNEY DISEASE), UNSPECIFIED STAGE: ICD-10-CM

## 2017-05-23 DIAGNOSIS — I10 ESSENTIAL HYPERTENSION: ICD-10-CM

## 2017-05-23 DIAGNOSIS — R19.7 DIARRHEA, UNSPECIFIED TYPE: ICD-10-CM

## 2017-05-23 PROCEDURE — 99999 PR PBB SHADOW E&M-EST. PATIENT-LVL IV: CPT | Mod: PBBFAC,,, | Performed by: NURSE PRACTITIONER

## 2017-05-23 PROCEDURE — 99214 OFFICE O/P EST MOD 30 MIN: CPT | Mod: PBBFAC,25,PO | Performed by: NURSE PRACTITIONER

## 2017-05-23 PROCEDURE — 76770 US EXAM ABDO BACK WALL COMP: CPT | Mod: 26,,, | Performed by: RADIOLOGY

## 2017-05-23 PROCEDURE — 99214 OFFICE O/P EST MOD 30 MIN: CPT | Mod: S$PBB,,, | Performed by: NURSE PRACTITIONER

## 2017-05-23 RX ORDER — OMEPRAZOLE 20 MG/1
20 CAPSULE, DELAYED RELEASE ORAL DAILY
Qty: 30 CAPSULE | Refills: 11 | Status: SHIPPED | OUTPATIENT
Start: 2017-05-23 | End: 2017-07-10 | Stop reason: DRUGHIGH

## 2017-05-23 RX ORDER — ZOLPIDEM TARTRATE 5 MG/1
5 TABLET ORAL NIGHTLY PRN
Qty: 30 TABLET | Refills: 0 | Status: SHIPPED | OUTPATIENT
Start: 2017-05-23 | End: 2017-06-19 | Stop reason: SDUPTHER

## 2017-05-23 NOTE — PROGRESS NOTES
Subjective:       Patient ID: Amanda Smith is a 66 y.o. female.    Chief Complaint: Hypertension (2 week follow up. Patient states feeling better)    Dr Tobar - Pain mgt -  taking 2 percocet/ day for chronic pain     Taking pepto and tums - with some relief -   Has been having off and on nausea and vomiting spells- with diarrhea for months  This spells last few days then stop   The frequency of them are getting closer together   Bp high today - has been doing labs and testing for Dr Valente       Hypertension   This is a chronic problem. The current episode started 1 to 4 weeks ago. The problem has been waxing and waning since onset. The problem is uncontrolled. Pertinent negatives include no anxiety, blurred vision, chest pain, headaches, malaise/fatigue, neck pain, orthopnea, palpitations, peripheral edema, PND, shortness of breath or sweats.   Nausea   This is a recurrent problem. The current episode started in the past 7 days. The problem occurs every several days. The problem has been gradually worsening. Associated symptoms include abdominal pain, anorexia, nausea, vomiting and weakness. Pertinent negatives include no arthralgias, change in bowel habit, chest pain, chills, congestion, coughing, diaphoresis, fatigue, fever, headaches, joint swelling, myalgias, neck pain, numbness, rash, sore throat, swollen glands, urinary symptoms, vertigo or visual change. The symptoms are aggravated by eating and drinking. She has tried rest for the symptoms. The treatment provided no relief.   Diarrhea    This is a recurrent problem. The current episode started in the past 7 days. The problem occurs more than 10 times per day. The problem has been waxing and waning. The stool consistency is described as watery. The patient states that diarrhea awakens her from sleep. Associated symptoms include abdominal pain and vomiting. Pertinent negatives include no arthralgias, bloating, chills, coughing, fever, headaches,  increased  flatus, myalgias, sweats or weight loss. There are no known risk factors. She has tried bismuth subsalicylate for the symptoms. The treatment provided moderate relief. There is no history of bowel resection, inflammatory bowel disease, irritable bowel syndrome, malabsorption, a recent abdominal surgery or short gut syndrome.     Vitals:    05/23/17 1110   BP: (!) 160/90   Pulse: 65   Temp: 98 °F (36.7 °C)     Review of Systems   Constitutional: Negative for chills, diaphoresis, fatigue, fever, malaise/fatigue and weight loss.   HENT: Negative.  Negative for congestion and sore throat.    Eyes: Negative.  Negative for blurred vision.   Respiratory: Negative.  Negative for cough, shortness of breath and wheezing.    Cardiovascular: Negative.  Negative for chest pain, palpitations, orthopnea and PND.   Gastrointestinal: Positive for abdominal pain, anorexia, diarrhea, nausea and vomiting. Negative for bloating, change in bowel habit and flatus.   Endocrine: Negative.    Genitourinary: Negative.  Negative for dysuria and hematuria.   Musculoskeletal: Negative.  Negative for arthralgias, joint swelling, myalgias and neck pain.   Skin: Negative.  Negative for color change and rash.   Allergic/Immunologic: Negative.    Neurological: Positive for weakness. Negative for vertigo, speech difficulty, numbness and headaches.   Hematological: Negative.    Psychiatric/Behavioral: Negative.        Past Medical History:   Diagnosis Date    Anemia of chronic disease 11/30/2015    Anxiety     Blood transfusion     Cataract     Chronic kidney disease     Depression     Fibromyalgia     GERD (gastroesophageal reflux disease)     Hypertension     Kidney stones     Renal failure     Temporal arteritis      Objective:      Physical Exam   Constitutional: She is oriented to person, place, and time. She appears well-developed and well-nourished.   HENT:   Head: Normocephalic and atraumatic.   Mouth/Throat: Oropharynx  is clear and moist.   Eyes: Conjunctivae and EOM are normal. Pupils are equal, round, and reactive to light.   Neck: Neck supple.   Cardiovascular: Normal rate, regular rhythm, normal heart sounds and intact distal pulses.    Pulmonary/Chest: Effort normal and breath sounds normal. No respiratory distress. She has no wheezes. She has no rales.   Abdominal: Soft. Bowel sounds are normal. She exhibits no distension and no mass. There is no tenderness. There is no guarding.   Musculoskeletal: Normal range of motion.   Neurological: She is alert and oriented to person, place, and time.   Skin: Skin is warm and dry.   Psychiatric: She has a normal mood and affect. Her behavior is normal.   Nursing note and vitals reviewed.      Assessment:       1. Intractable cyclical vomiting with nausea    2. Diarrhea, unspecified type    3. Insomnia, unspecified type    4. CKD (chronic kidney disease), unspecified stage    5. Obesity (BMI 30.0-34.9)    6. Essential hypertension    7. Chronic pain syndrome        Plan:       Intractable cyclical vomiting with nausea  -     Case request GI: ESOPHAGOGASTRODUODENOSCOPY (EGD)  -     Ambulatory referral to Gastroenterology  -     omeprazole (PRILOSEC) 20 MG capsule; Take 1 capsule (20 mg total) by mouth once daily.  Dispense: 30 capsule; Refill: 11    Diarrhea, unspecified type  -     Case request GI: ESOPHAGOGASTRODUODENOSCOPY (EGD)  -     Ambulatory referral to Gastroenterology  -     omeprazole (PRILOSEC) 20 MG capsule; Take 1 capsule (20 mg total) by mouth once daily.  Dispense: 30 capsule; Refill: 11    Insomnia, unspecified type  -     zolpidem (AMBIEN) 5 MG Tab; Take 1 tablet (5 mg total) by mouth nightly as needed.  Dispense: 30 tablet; Refill: 0    CKD (chronic kidney disease), unspecified stage  Seeing Dr Valente    Obesity (BMI 30.0-34.9)    Essential hypertension  Higher today due to pain     Chronic pain syndrome  On percocet - seeing Dr Ekaterina Chambers if not better  Need  her to Follow up in 3 months   Discussed that she will have EGD and then Follow up with GI for further workup         Return in about 3 months (around 8/23/2017).

## 2017-06-07 ENCOUNTER — TELEPHONE (OUTPATIENT)
Dept: ENDOSCOPY | Facility: HOSPITAL | Age: 67
End: 2017-06-07

## 2017-06-07 NOTE — TELEPHONE ENCOUNTER
Have not been able to contact patient about appointment tomorrow. Have tried her number and her sisters. Gilda

## 2017-06-08 ENCOUNTER — ANESTHESIA EVENT (OUTPATIENT)
Dept: ENDOSCOPY | Facility: HOSPITAL | Age: 67
End: 2017-06-08
Payer: MEDICARE

## 2017-06-08 ENCOUNTER — HOSPITAL ENCOUNTER (OUTPATIENT)
Facility: HOSPITAL | Age: 67
Discharge: HOME | End: 2017-06-08
Attending: INTERNAL MEDICINE | Admitting: INTERNAL MEDICINE
Payer: MEDICARE

## 2017-06-08 ENCOUNTER — ANESTHESIA (OUTPATIENT)
Dept: ENDOSCOPY | Facility: HOSPITAL | Age: 67
End: 2017-06-08
Payer: MEDICARE

## 2017-06-08 ENCOUNTER — SURGERY (OUTPATIENT)
Age: 67
End: 2017-06-08

## 2017-06-08 VITALS
HEART RATE: 88 BPM | TEMPERATURE: 98 F | DIASTOLIC BLOOD PRESSURE: 78 MMHG | RESPIRATION RATE: 18 BRPM | WEIGHT: 180 LBS | BODY MASS INDEX: 31.89 KG/M2 | SYSTOLIC BLOOD PRESSURE: 151 MMHG | OXYGEN SATURATION: 94 % | HEIGHT: 63 IN | RESPIRATION RATE: 21 BRPM

## 2017-06-08 DIAGNOSIS — R11.2 N&V (NAUSEA AND VOMITING): ICD-10-CM

## 2017-06-08 LAB — H PYLORI INDEX VALUE: NEGATIVE

## 2017-06-08 PROCEDURE — 25000003 PHARM REV CODE 250: Mod: PO | Performed by: NURSE ANESTHETIST, CERTIFIED REGISTERED

## 2017-06-08 PROCEDURE — 27201012 HC FORCEPS, HOT/COLD, DISP: Mod: PO | Performed by: INTERNAL MEDICINE

## 2017-06-08 PROCEDURE — 88342 IMHCHEM/IMCYTCHM 1ST ANTB: CPT | Mod: 26,,, | Performed by: PATHOLOGY

## 2017-06-08 PROCEDURE — 88305 TISSUE EXAM BY PATHOLOGIST: CPT | Performed by: PATHOLOGY

## 2017-06-08 PROCEDURE — 88305 TISSUE EXAM BY PATHOLOGIST: CPT | Mod: 26,,, | Performed by: PATHOLOGY

## 2017-06-08 PROCEDURE — 25000003 PHARM REV CODE 250: Mod: PO | Performed by: INTERNAL MEDICINE

## 2017-06-08 PROCEDURE — D9220A PRA ANESTHESIA: Mod: CRNA,,, | Performed by: NURSE ANESTHETIST, CERTIFIED REGISTERED

## 2017-06-08 PROCEDURE — 87449 NOS EACH ORGANISM AG IA: CPT | Mod: PO | Performed by: INTERNAL MEDICINE

## 2017-06-08 PROCEDURE — D9220A PRA ANESTHESIA: Mod: ANES,,, | Performed by: ANESTHESIOLOGY

## 2017-06-08 PROCEDURE — 37000008 HC ANESTHESIA 1ST 15 MINUTES: Mod: PO | Performed by: INTERNAL MEDICINE

## 2017-06-08 PROCEDURE — 43239 EGD BIOPSY SINGLE/MULTIPLE: CPT | Mod: ,,, | Performed by: INTERNAL MEDICINE

## 2017-06-08 PROCEDURE — 43239 EGD BIOPSY SINGLE/MULTIPLE: CPT | Mod: PO | Performed by: INTERNAL MEDICINE

## 2017-06-08 PROCEDURE — 37000009 HC ANESTHESIA EA ADD 15 MINS: Mod: PO | Performed by: INTERNAL MEDICINE

## 2017-06-08 PROCEDURE — 63600175 PHARM REV CODE 636 W HCPCS: Mod: PO | Performed by: NURSE ANESTHETIST, CERTIFIED REGISTERED

## 2017-06-08 RX ORDER — PROPOFOL 10 MG/ML
VIAL (ML) INTRAVENOUS
Status: DISCONTINUED | OUTPATIENT
Start: 2017-06-08 | End: 2017-06-08

## 2017-06-08 RX ORDER — OMEPRAZOLE 40 MG/1
40 CAPSULE, DELAYED RELEASE ORAL DAILY
Qty: 30 CAPSULE | Refills: 2 | Status: SHIPPED | OUTPATIENT
Start: 2017-06-08 | End: 2017-07-20 | Stop reason: SDUPTHER

## 2017-06-08 RX ORDER — LIDOCAINE HCL/PF 100 MG/5ML
SYRINGE (ML) INTRAVENOUS
Status: DISCONTINUED | OUTPATIENT
Start: 2017-06-08 | End: 2017-06-08

## 2017-06-08 RX ORDER — SODIUM CHLORIDE, SODIUM LACTATE, POTASSIUM CHLORIDE, CALCIUM CHLORIDE 600; 310; 30; 20 MG/100ML; MG/100ML; MG/100ML; MG/100ML
INJECTION, SOLUTION INTRAVENOUS CONTINUOUS
Status: DISCONTINUED | OUTPATIENT
Start: 2017-06-08 | End: 2017-06-08 | Stop reason: HOSPADM

## 2017-06-08 RX ADMIN — PROPOFOL 30 MG: 10 INJECTION, EMULSION INTRAVENOUS at 10:06

## 2017-06-08 RX ADMIN — LIDOCAINE HYDROCHLORIDE 100 MG: 20 INJECTION, SOLUTION INTRAVENOUS at 10:06

## 2017-06-08 RX ADMIN — SODIUM CHLORIDE, SODIUM LACTATE, POTASSIUM CHLORIDE, AND CALCIUM CHLORIDE: .6; .31; .03; .02 INJECTION, SOLUTION INTRAVENOUS at 09:06

## 2017-06-08 NOTE — DISCHARGE INSTRUCTIONS
Procedural Sedation (Adult)  You have been given medicine by vein to make you sleep during your surgery. This may have included both a pain medicine and sleeping medicine. Most of the effects have worn off. But you may still have some drowsiness for the next 6 to 8 hours.  Home care  Follow these guidelines when you get home:  · For the next 8 hours, you should be watched by a responsible adult. This person should make sure your condition is not getting worse.  · Don't take any medicine by mouth for pain or for sleep during the next 4 hours. These might react with the medicines you were given in the hospital. This could cause a much stronger response than usual.  · Don't drink any alcohol for the next 24 hours.  · Don't drive, operate dangerous machinery, or make important business or personal decisions during the next 24 hours.  Follow-up care  Follow up with your healthcare provider if you are not alert and back to your usual level of activity within 12 hours.  When to seek medical advice  Call your healthcare provider right away if any of these occur:  · Drowsiness gets worse  · Weakness or dizziness gets worse  · Repeated vomiting  · You cannot be awakened   Date Last Reviewed: 10/18/2016  © 9800-4616 [employer]. [address] [place] [address], [place] [address]. All rights reserved. This information is not intended as a substitute for professional medical care. Always follow your healthcare professional's instructions.

## 2017-06-08 NOTE — H&P
History & Physical - Short Stay  Gastroenterology      SUBJECTIVE:     Procedure: EGD    Chief Complaint/Indication for Procedure: Change in Bowel Habits and N/V    PTA Medications   Medication Sig    amlodipine (NORVASC) 10 MG tablet Take 1 tablet (10 mg total) by mouth once daily.    carvedilol (COREG) 12.5 MG tablet Take 1 tablet (12.5 mg total) by mouth 2 (two) times daily with meals.    gabapentin (NEURONTIN) 300 MG capsule Take 600 mg by mouth 3 (three) times daily.     morphine (MS CONTIN) 15 MG 12 hr tablet Take 15 mg by mouth 2 (two) times daily.    omeprazole (PRILOSEC) 20 MG capsule Take 1 capsule (20 mg total) by mouth once daily.    tizanidine (ZANAFLEX) 4 MG tablet every 8 (eight) hours.     zolpidem (AMBIEN) 5 MG Tab Take 1 tablet (5 mg total) by mouth nightly as needed.    oxycodone-acetaminophen (PERCOCET)  mg per tablet Take 1 tablet by mouth every 4 (four) hours as needed for Pain.       Review of patient's allergies indicates:   Allergen Reactions    Duloxetine Nausea And Vomiting    Ondansetron hcl (pf)      Other reaction(s): hypertension  Other reaction(s): chest pain    Sulfa (sulfonamide antibiotics)      Other reaction(s): acute renal failure        Past Medical History:   Diagnosis Date    Anemia of chronic disease 2015    Anxiety     Blood transfusion     Cataract     Chronic kidney disease     Depression     Fibromyalgia     GERD (gastroesophageal reflux disease)     Hypertension     Kidney stones     Renal failure     Temporal arteritis      Past Surgical History:   Procedure Laterality Date    BACK SURGERY      x5     Brest tumor removal      cervical fusion\       SECTION      CHOLECYSTECTOMY      HYSTERECTOMY      MRSA      neck and arm - mosquito bite      Family History   Problem Relation Age of Onset    Diabetes Mother     Cancer Father      Brain    Heart disease Sister     Diabetes Brother     Heart disease Brother      Diabetes Maternal Grandmother     Heart disease Maternal Grandfather      Social History   Substance Use Topics    Smoking status: Smoker, Current Status Unknown     Packs/day: 1.00     Years: 45.00     Types: Cigarettes    Smokeless tobacco: Never Used      Comment: 1 pack a week     Alcohol use No         OBJECTIVE:     Vital Signs (Most Recent)  Temp: 97.9 °F (36.6 °C) (06/08/17 0910)  Pulse: 95 (06/08/17 0910)  Resp: 20 (06/08/17 0910)  BP: (!) 159/96 (06/08/17 0910)    Physical Exam:                                                       GENERAL:  Comfortable, in no acute distress.                                 HEENT EXAM:  Nonicteric.  No adenopathy.  Oropharynx is clear.               NECK:  Supple.                                                               LUNGS:  Clear.                                                               CARDIAC:  Regular rate and rhythm.  S1, S2.  No murmur.                      ABDOMEN:  Soft, positive bowel sounds, nontender.  No hepatosplenomegaly or masses.  No rebound or guarding.                                             EXTREMITIES:  No edema.     MENTAL STATUS:  Normal, alert and oriented.      ASSESSMENT/PLAN:     Assessment: Change in Bowel Habits and N/V    Plan: EGD    Anesthesia Plan: General    ASA Grade: ASA 2 - Patient with mild systemic disease with no functional limitations    MALLAMPATI SCORE:  I (soft palate, uvula, fauces, and tonsillar pillars visible)

## 2017-06-08 NOTE — PLAN OF CARE
POST EGD WITH ROUTINE BX.  PT COUGHING COARSE NPC IN PACU.  VSS.  PLEASANT. COOP AFFECT.  SEE DETAILS FURTHER

## 2017-06-08 NOTE — TRANSFER OF CARE
"Anesthesia Transfer of Care Note    Patient: Amanda Smith    Procedure(s) Performed: Procedure(s) (LRB):  ESOPHAGOGASTRODUODENOSCOPY (EGD) (N/A)    Patient location: PACU    Anesthesia Type: general    Transport from OR: Transported from OR on room air with adequate spontaneous ventilation    Post pain: adequate analgesia    Post assessment: no apparent anesthetic complications and tolerated procedure well    Post vital signs: stable    Level of consciousness: awake and alert    Nausea/Vomiting: no nausea/vomiting    Complications: none    Transfer of care protocol was followed      Last vitals:   Visit Vitals  BP (!) 159/96 (BP Location: Left arm, Patient Position: Sitting, BP Method: Automatic)   Pulse 95   Temp 36.6 °C (97.9 °F) (Skin)   Resp 20   Ht 5' 3" (1.6 m)   Wt 81.6 kg (180 lb)   Breastfeeding? No   BMI 31.89 kg/m²     "

## 2017-06-08 NOTE — DISCHARGE SUMMARY
Discharge Note  Short Stay      SUMMARY     Admit Date: 6/8/2017    Attending Physician: Ba Shen MD     Discharge Physician: Ba Shen MD    Discharge Date: 6/8/2017 10:19 AM    Final Diagnosis: Intractable cyclical vomiting with nausea [G43.A1]  Diarrhea, unspecified type [R19.7]    Disposition: HOME OR SELF CARE    Patient Instructions:   Current Discharge Medication List      START taking these medications    Details   !! omeprazole (PRILOSEC) 40 MG capsule Take 1 capsule (40 mg total) by mouth once daily.  Qty: 30 capsule, Refills: 2      ranitidine (ZANTAC) 300 MG tablet Take 1 tablet (300 mg total) by mouth every evening.  Qty: 30 tablet, Refills: 2       !! - Potential duplicate medications found. Please discuss with provider.      CONTINUE these medications which have NOT CHANGED    Details   amlodipine (NORVASC) 10 MG tablet Take 1 tablet (10 mg total) by mouth once daily.  Qty: 90 tablet, Refills: 1    Associated Diagnoses: Essential hypertension      carvedilol (COREG) 12.5 MG tablet Take 1 tablet (12.5 mg total) by mouth 2 (two) times daily with meals.  Qty: 180 tablet, Refills: 1    Associated Diagnoses: Essential hypertension      gabapentin (NEURONTIN) 300 MG capsule Take 600 mg by mouth 3 (three) times daily.   Refills: 2      morphine (MS CONTIN) 15 MG 12 hr tablet Take 15 mg by mouth 2 (two) times daily.  Refills: 0      !! omeprazole (PRILOSEC) 20 MG capsule Take 1 capsule (20 mg total) by mouth once daily.  Qty: 30 capsule, Refills: 11    Associated Diagnoses: Intractable cyclical vomiting with nausea; Diarrhea, unspecified type      tizanidine (ZANAFLEX) 4 MG tablet every 8 (eight) hours.   Refills: 5      zolpidem (AMBIEN) 5 MG Tab Take 1 tablet (5 mg total) by mouth nightly as needed.  Qty: 30 tablet, Refills: 0    Associated Diagnoses: Insomnia, unspecified type      oxycodone-acetaminophen (PERCOCET)  mg per tablet Take 1 tablet by mouth every 4 (four) hours as  needed for Pain.       !! - Potential duplicate medications found. Please discuss with provider.          Discharge Procedure Orders (must include Diet, Follow-up, Activity)    Follow Up:  Follow up with PCP as previously scheduled  Resume routine diet.  Activity as tolerated.    No driving day of procedure.

## 2017-06-08 NOTE — ANESTHESIA POSTPROCEDURE EVALUATION
"Anesthesia Post Evaluation    Patient: Amanda Smith    Procedure(s) Performed: Procedure(s) (LRB):  ESOPHAGOGASTRODUODENOSCOPY (EGD) (N/A)    Final Anesthesia Type: general  Patient location during evaluation: PACU  Patient participation: Yes- Able to Participate  Level of consciousness: awake and alert  Post-procedure vital signs: reviewed and stable  Pain management: adequate  Airway patency: patent  PONV status at discharge: No PONV  Anesthetic complications: no      Cardiovascular status: blood pressure returned to baseline and hemodynamically stable  Respiratory status: unassisted  Hydration status: euvolemic  Follow-up not needed.        Visit Vitals  /88 (BP Location: Left arm, Patient Position: Lying, BP Method: Automatic)   Pulse 92   Temp 36.6 °C (97.8 °F) (Temporal)   Resp 18   Ht 5' 3" (1.6 m)   Wt 81.6 kg (180 lb)   SpO2 96%   Breastfeeding? No   BMI 31.89 kg/m²       Pain/Yue Score: Pain Assessment Performed: Yes (6/8/2017  9:05 AM)  Presence of Pain: complains of pain/discomfort (6/8/2017  9:05 AM)      "

## 2017-06-08 NOTE — ANESTHESIA PREPROCEDURE EVALUATION
06/08/2017  Amanda Smith is a 66 y.o., female.    Anesthesia Evaluation    I have reviewed the Patient Summary Reports.    I have reviewed the Nursing Notes.      Review of Systems  Anesthesia Hx:  No problems with previous Anesthesia    Cardiovascular:   Hypertension, well controlled    Renal/:   Chronic Renal Disease, CRI    Hepatic/GI:   GERD, well controlled        Physical Exam  General:  Obesity                 Anesthesia Plan  Type of Anesthesia, risks & benefits discussed:  Anesthesia Type:  general  Patient's Preference:   Intra-op Monitoring Plan:   Intra-op Monitoring Plan Comments:   Post Op Pain Control Plan:   Post Op Pain Control Plan Comments:   Induction:   IV  Beta Blocker:  Patient is not currently on a Beta-Blocker (No further documentation required).       Informed Consent: Patient understands risks and agrees with Anesthesia plan.  Questions answered. Anesthesia consent signed with patient.  ASA Score: 2     Day of Surgery Review of History & Physical:    H&P update referred to the surgeon.         Ready For Surgery From Anesthesia Perspective.

## 2017-06-12 ENCOUNTER — TELEPHONE (OUTPATIENT)
Dept: FAMILY MEDICINE | Facility: CLINIC | Age: 67
End: 2017-06-12

## 2017-06-12 NOTE — TELEPHONE ENCOUNTER
Patient needs to make a PCP appt in august - to establish she is  [facility] patient   Also make her FU with Follow up with Ruchi murphy NP for her EGD FU

## 2017-06-12 NOTE — TELEPHONE ENCOUNTER
"Admitted to hospital in [place]., [place], possible "blood clots in my lungs".  Does not know name of hospital, states this is the first time she has answered her cell phone since admit.  She is not certain how long she will be there.      Advised her to call back when discharged and schedule appointment with a new primary care physician and a follow up with Nurse Tiffany in gastro.  "

## 2017-06-12 NOTE — TELEPHONE ENCOUNTER
Please make sure that she followed up with PCP   She will need to be seen for hospital Follow up

## 2017-06-13 ENCOUNTER — TELEPHONE (OUTPATIENT)
Dept: FAMILY MEDICINE | Facility: CLINIC | Age: 67
End: 2017-06-13

## 2017-06-13 NOTE — TELEPHONE ENCOUNTER
----- Message from Elli Au sent at 6/13/2017 11:54 AM CDT -----  Contact: [facility]   Needs 1-2 week follow up   Discharging  06 13 2017  Call pt at home for appt   [phone] cell

## 2017-06-13 NOTE — TELEPHONE ENCOUNTER
Scheduled the patient for extended visit with dr wills.  She is admitted in [facility].  I gave her the fax number to us the paper work.  She was a [facility] patient. Verb/understanding of patient

## 2017-06-19 DIAGNOSIS — G47.00 INSOMNIA, UNSPECIFIED TYPE: ICD-10-CM

## 2017-06-19 RX ORDER — ZOLPIDEM TARTRATE 5 MG/1
TABLET ORAL
Qty: 30 TABLET | Refills: 0 | Status: SHIPPED | OUTPATIENT
Start: 2017-06-19 | End: 2017-07-10 | Stop reason: SDUPTHER

## 2017-06-20 NOTE — TELEPHONE ENCOUNTER
Patient wants ABTX called in because she still feels bad after her ER visit in [place]. I told her we dont call in ABTX ,  I told her that is she feels she needs ABTX and not getting better to go to the ER.  The patient sounded SOB and said she was showering and SOB.  She stated she has no one to bring her  the doctor today. I informed her if she feesl that bad and to call [facility] they will bring her to the ER. She said if she needs to that she will call.

## 2017-06-20 NOTE — TELEPHONE ENCOUNTER
----- Message from Allison Koch sent at 6/20/2017 12:10 PM CDT -----  Contact: self  Patient called stating she was in the hospital in [place], [place] for breathing problems and Pulmonary Embolism    She discharges on 6/19/17 without medication    She would like to speak to a nurse to see if she can get an antibiotic    Please call  to advise.     Thanks

## 2017-06-22 ENCOUNTER — PATIENT OUTREACH (OUTPATIENT)
Dept: ADMINISTRATIVE | Facility: HOSPITAL | Age: 67
End: 2017-06-22

## 2017-06-30 ENCOUNTER — PATIENT OUTREACH (OUTPATIENT)
Dept: ADMINISTRATIVE | Facility: HOSPITAL | Age: 67
End: 2017-06-30

## 2017-06-30 NOTE — LETTER
June 30, 2017    Amanda L Sarah  [address] [address]             [facility]  [address]  [facility] [place] [facility] [address]  Phone: [phone] Dear MsKari Sarah:    We have tried to reach you by mychart unsuccessfully.     [facility] is committed to your overall health.  To help you get the most out of each of your visits, we will review your information to make sure you are up to date on all of your recommended tests and/or procedures.       Dr. Gaitan        has found that you may be due for:     One-time Hepatitis C Screening lab test(a viral condition that can harm the liver)   Tetanus immunization   Pneumonia immunization   Mammogram   colonoscopy   Shingles immunization   Cholesterol check (Lipid Panel)     If you have had any of the above done at another facility, please bring the records or information with you so that your record at [facility] will be complete.      If you are currently taking medication , please bring it with you to your appointment for review.     Sincerely,    Sylwia Payne  Clinical Care Coordinator  [facility] [address] [place] [address]  Phone: [phone]   Fax: [phone]

## 2017-07-10 ENCOUNTER — OFFICE VISIT (OUTPATIENT)
Dept: FAMILY MEDICINE | Facility: CLINIC | Age: 67
End: 2017-07-10
Payer: MEDICARE

## 2017-07-10 VITALS
WEIGHT: 185.63 LBS | HEIGHT: 63 IN | TEMPERATURE: 98 F | SYSTOLIC BLOOD PRESSURE: 164 MMHG | BODY MASS INDEX: 32.89 KG/M2 | DIASTOLIC BLOOD PRESSURE: 90 MMHG | RESPIRATION RATE: 18 BRPM | HEART RATE: 99 BPM | OXYGEN SATURATION: 98 %

## 2017-07-10 DIAGNOSIS — G47.00 INSOMNIA, UNSPECIFIED TYPE: ICD-10-CM

## 2017-07-10 DIAGNOSIS — R11.2 NAUSEA AND VOMITING, INTRACTABILITY OF VOMITING NOT SPECIFIED, UNSPECIFIED VOMITING TYPE: Primary | ICD-10-CM

## 2017-07-10 DIAGNOSIS — I10 ESSENTIAL HYPERTENSION: ICD-10-CM

## 2017-07-10 DIAGNOSIS — J44.9 CHRONIC OBSTRUCTIVE PULMONARY DISEASE, UNSPECIFIED COPD TYPE: ICD-10-CM

## 2017-07-10 DIAGNOSIS — R19.7 DIARRHEA, UNSPECIFIED TYPE: ICD-10-CM

## 2017-07-10 DIAGNOSIS — R73.02 GLUCOSE INTOLERANCE (IMPAIRED GLUCOSE TOLERANCE): ICD-10-CM

## 2017-07-10 PROCEDURE — 99214 OFFICE O/P EST MOD 30 MIN: CPT | Mod: S$PBB,,, | Performed by: INTERNAL MEDICINE

## 2017-07-10 PROCEDURE — 99999 PR PBB SHADOW E&M-EST. PATIENT-LVL III: CPT | Mod: PBBFAC,,, | Performed by: INTERNAL MEDICINE

## 2017-07-10 PROCEDURE — 99213 OFFICE O/P EST LOW 20 MIN: CPT | Mod: PBBFAC,PO | Performed by: INTERNAL MEDICINE

## 2017-07-10 RX ORDER — CARVEDILOL 25 MG/1
25 TABLET ORAL 2 TIMES DAILY WITH MEALS
Qty: 60 TABLET | Refills: 6 | Status: SHIPPED | OUTPATIENT
Start: 2017-07-10 | End: 2018-01-28 | Stop reason: SDUPTHER

## 2017-07-10 RX ORDER — PROMETHAZINE HYDROCHLORIDE 25 MG/1
25 TABLET ORAL EVERY 4 HOURS PRN
Qty: 30 TABLET | Refills: 1 | Status: SHIPPED | OUTPATIENT
Start: 2017-07-10 | End: 2017-08-17 | Stop reason: SDUPTHER

## 2017-07-10 RX ORDER — HYDROXYZINE HYDROCHLORIDE 25 MG/1
TABLET, FILM COATED ORAL
Qty: 60 TABLET | Refills: 6 | Status: SHIPPED | OUTPATIENT
Start: 2017-07-10 | End: 2018-02-26

## 2017-07-10 RX ORDER — HYDROCHLOROTHIAZIDE 25 MG/1
25 TABLET ORAL DAILY
Qty: 30 TABLET | Refills: 11 | Status: SHIPPED | OUTPATIENT
Start: 2017-07-10 | End: 2018-02-26

## 2017-07-10 RX ORDER — ZOLPIDEM TARTRATE 5 MG/1
5 TABLET ORAL NIGHTLY PRN
Qty: 30 TABLET | Refills: 3 | Status: SHIPPED | OUTPATIENT
Start: 2017-07-10 | End: 2017-11-05 | Stop reason: SDUPTHER

## 2017-07-10 NOTE — PATIENT INSTRUCTIONS
Use Melatonin to help you sleep.  Start with 5 mg 1 hr before bed and 5 mg as you turn off the light.  You may increase the 1 hr before dose to 10 mg, 20 mg, or 30 mg.  Keep the 5 mg as you turn off the light.

## 2017-07-10 NOTE — PROGRESS NOTES
[facility], given to Dr. Gaitan for review, then to be sent to scan.

## 2017-07-10 NOTE — PROGRESS NOTES
"Subjective:       Patient ID: Amanda Smith is a 66 y.o. female.    Chief Complaint: Hospital Follow Up (pulmonary embolism); Establish Care; Nausea; Emesis; Fever; and Medication Refill    New pt to me:  Recent admit to hospital in [place] for a COPD exacerbation.  Was initially suspected with a PE and started on Eliquis.  Could not do a CT initially bc of CKD and unable to give contrast, but then a repeat lung exam (nuclear) did not show a PE 3 days later, so Eliquis was DC.  She was "out of it" and does not remember much of the initial hospital stay.   Chronic pain s/p multiple low back surgeries.  Dr Josr Tobar   CKD III - IV - stable; Dr Valente.   COPD - no SOB now.  HTN - uncontrolled  Chronic nausea, vomiting and diarrhea - episodic, but 6 times in past 8 months.  Last 3-4 days, but takes additional 3-4 days to get her strength back because she is unable to eat or take her medication during these episodes.  EGD in June dx gastritis, neg H. Pylori.  She received anti-biotics in [place] that helped alleviate her symptoms.  No pain.  PPI does not help.  Allergic to Zofran.  Phenergan helps.  This current episode started 4 days ago.  She has some mild residual nausea and diarrhea.   Insomnia - takes ambien 5mg every 2-3 nights      Review of Systems   Constitutional: Negative for appetite change and fever.   HENT: Negative for nosebleeds and trouble swallowing.    Eyes: Negative for discharge and visual disturbance.   Respiratory: Negative for choking and shortness of breath.    Cardiovascular: Negative for chest pain and palpitations.   Gastrointestinal: Positive for diarrhea, nausea and vomiting. Negative for abdominal pain.   Musculoskeletal: Positive for back pain. Negative for arthralgias and joint swelling.   Skin: Negative for rash and wound.   Neurological: Negative for dizziness and syncope.   Psychiatric/Behavioral: Negative for confusion and dysphoric mood.       Objective:      Vitals:    " 07/10/17 0916   BP: (!) 164/90   Pulse: 99   Resp: 18   Temp: 98.4 °F (36.9 °C)     Physical Exam   Constitutional: She appears well-nourished.   Eyes: Conjunctivae and EOM are normal.   Neck: Normal range of motion.   Cardiovascular: Normal rate and regular rhythm.    Pulmonary/Chest: Effort normal and breath sounds normal.   Abdominal: Soft. Bowel sounds are increased. There is no tenderness.   Musculoskeletal:   Normal ROM bilateral    Neurological: No cranial nerve deficit (grossly intact).   Skin: Skin is warm and dry.   Psychiatric: She has a normal mood and affect.   Alert and orientated   Vitals reviewed.        Assessment:       1. Nausea and vomiting, intractability of vomiting not specified, unspecified vomiting type    2. Insomnia, unspecified type    3. Diarrhea, unspecified type    4. Essential hypertension    5. Chronic obstructive pulmonary disease, unspecified COPD type    6. Glucose intolerance (impaired glucose tolerance)        Plan:       Nausea and vomiting, intractability of vomiting not specified, unspecified vomiting type  -     promethazine (PHENERGAN) 25 MG tablet; Take 1 tablet (25 mg total) by mouth every 4 (four) hours as needed for Nausea.  Dispense: 30 tablet; Refill: 1  -     Ambulatory consult to Gastroenterology    Insomnia, unspecified type  -     zolpidem (AMBIEN) 5 MG Tab; Take 1 tablet (5 mg total) by mouth nightly as needed.  Dispense: 30 tablet; Refill: 3  -     hydrOXYzine HCl (ATARAX) 25 MG tablet; 1 po qhs prn insomnia.  May increase 1 tab per night up to max 4 tab  Dispense: 60 tablet; Refill: 6    Diarrhea, unspecified type  -     Ambulatory consult to Gastroenterology    Essential hypertension  -     Comprehensive metabolic panel; Future; Expected date: 01/06/2018  -     Lipid panel; Future; Expected date: 01/06/2018  -     hydrochlorothiazide (HYDRODIURIL) 25 MG tablet; Take 1 tablet (25 mg total) by mouth once daily.  Dispense: 30 tablet; Refill: 11  -     carvedilol  (COREG) 25 MG tablet; Take 1 tablet (25 mg total) by mouth 2 (two) times daily with meals.  Dispense: 60 tablet; Refill: 6    Chronic obstructive pulmonary disease, unspecified COPD type    Glucose intolerance (impaired glucose tolerance)  -     Hemoglobin A1c; Future; Expected date: 01/06/2018      Medication List with Changes/Refills   New Medications    HYDROCHLOROTHIAZIDE (HYDRODIURIL) 25 MG TABLET    Take 1 tablet (25 mg total) by mouth once daily.    HYDROXYZINE HCL (ATARAX) 25 MG TABLET    1 po qhs prn insomnia.  May increase 1 tab per night up to max 4 tab    PROMETHAZINE (PHENERGAN) 25 MG TABLET    Take 1 tablet (25 mg total) by mouth every 4 (four) hours as needed for Nausea.   Current Medications    AMLODIPINE (NORVASC) 10 MG TABLET    Take 1 tablet (10 mg total) by mouth once daily.    GABAPENTIN (NEURONTIN) 300 MG CAPSULE    Take 600 mg by mouth 3 (three) times daily.     OMEPRAZOLE (PRILOSEC) 40 MG CAPSULE    Take 1 capsule (40 mg total) by mouth once daily.    OXYCODONE-ACETAMINOPHEN (PERCOCET)  MG PER TABLET    Take 1 tablet by mouth every 4 (four) hours as needed for Pain.    TIZANIDINE (ZANAFLEX) 4 MG TABLET    every 8 (eight) hours.    Changed and/or Refilled Medications    Modified Medication Previous Medication    CARVEDILOL (COREG) 25 MG TABLET carvedilol (COREG) 12.5 MG tablet       Take 1 tablet (25 mg total) by mouth 2 (two) times daily with meals.    Take 1 tablet (12.5 mg total) by mouth 2 (two) times daily with meals.    ZOLPIDEM (AMBIEN) 5 MG TAB zolpidem (AMBIEN) 5 MG Tab       Take 1 tablet (5 mg total) by mouth nightly as needed.    TAKE 1 TABLET BY MOUTH AT BEDTIME AS NEEDED   Discontinued Medications    MORPHINE (MS CONTIN) 15 MG 12 HR TABLET    Take 15 mg by mouth 2 (two) times daily.    OMEPRAZOLE (PRILOSEC) 20 MG CAPSULE    Take 1 capsule (20 mg total) by mouth once daily.    RANITIDINE (ZANTAC) 300 MG TABLET    Take 1 tablet (300 mg total) by mouth every evening.  "      Nurse bp check in 2 wk        Counseled on regular exercise, maintenance of a healthy weight, balanced diet rich in fruits/vegetables and lean protein, and avoidance of unhealthy habits like smoking and excessive alcohol intake.   Also, counseled on importance of being compliant with medication, health appointments, diet and exercise.     Return in about 6 months (around 1/10/2018).    "This note will not be shared with the patient."  "

## 2017-07-20 DIAGNOSIS — K21.9 GASTROESOPHAGEAL REFLUX DISEASE, ESOPHAGITIS PRESENCE NOT SPECIFIED: Primary | ICD-10-CM

## 2017-07-20 RX ORDER — OMEPRAZOLE 40 MG/1
40 CAPSULE, DELAYED RELEASE ORAL DAILY
Qty: 90 CAPSULE | Refills: 3 | Status: SHIPPED | OUTPATIENT
Start: 2017-07-20 | End: 2019-08-16 | Stop reason: ALTCHOICE

## 2017-08-14 ENCOUNTER — TELEPHONE (OUTPATIENT)
Dept: FAMILY MEDICINE | Facility: CLINIC | Age: 67
End: 2017-08-14

## 2017-08-14 ENCOUNTER — CLINICAL SUPPORT (OUTPATIENT)
Dept: FAMILY MEDICINE | Facility: CLINIC | Age: 67
End: 2017-08-14
Payer: MEDICARE

## 2017-08-14 VITALS
BODY MASS INDEX: 32.73 KG/M2 | SYSTOLIC BLOOD PRESSURE: 166 MMHG | WEIGHT: 184.75 LBS | DIASTOLIC BLOOD PRESSURE: 82 MMHG | HEIGHT: 63 IN

## 2017-08-14 DIAGNOSIS — I10 HYPERTENSION, UNSPECIFIED TYPE: Primary | ICD-10-CM

## 2017-08-14 PROCEDURE — 99212 OFFICE O/P EST SF 10 MIN: CPT | Mod: PBBFAC,PO

## 2017-08-14 PROCEDURE — 99212 OFFICE O/P EST SF 10 MIN: CPT | Mod: S$PBB,,, | Performed by: INTERNAL MEDICINE

## 2017-08-14 PROCEDURE — 99999 PR PBB SHADOW E&M-EST. PATIENT-LVL II: CPT | Mod: PBBFAC,,,

## 2017-08-14 RX ORDER — HYDRALAZINE HYDROCHLORIDE 10 MG/1
10 TABLET, FILM COATED ORAL 3 TIMES DAILY
Qty: 90 TABLET | Refills: 11 | Status: SHIPPED | OUTPATIENT
Start: 2017-08-14 | End: 2018-03-12

## 2017-08-14 NOTE — TELEPHONE ENCOUNTER
Called and spoke with patient and rescheduled nurse visit  She will go  her med at [facility]   Partially impaired: cannot see medication labels or newsprint, but can see obstacles in path, and the surrounding layout; can count fingers at arm's length

## 2017-08-14 NOTE — TELEPHONE ENCOUNTER
Patient came in for blood pressure read but no cuff to compare to for the patient she said she can afford the cuff at this time. Advise we will call her if any changes. Pain level of 2 in her back and no headache ,no left arm pain. Also, stated that she would like to have orders for imunizations, she needs to know if she needs shingles and said she did have shingles before, please advise ty

## 2017-08-14 NOTE — PATIENT INSTRUCTIONS
To try and find a blood pressure cuff or borrow someones , she doesn't have one to compare the cuff to , she cant afford the cuff

## 2017-08-14 NOTE — NURSING
Patient BP was elevated 166/82 , she had no BP monitor to compare to she is on a fixed income.  She cant afford it, sometimes they go on sale at [employer] to monitor that and or borrow one from someone.  Advised we will call after Dr Gaitan looks over her results.

## 2017-08-17 ENCOUNTER — LAB VISIT (OUTPATIENT)
Dept: LAB | Facility: HOSPITAL | Age: 67
End: 2017-08-17
Attending: INTERNAL MEDICINE
Payer: MEDICARE

## 2017-08-17 ENCOUNTER — INITIAL CONSULT (OUTPATIENT)
Dept: GASTROENTEROLOGY | Facility: CLINIC | Age: 67
End: 2017-08-17
Payer: MEDICARE

## 2017-08-17 VITALS
BODY MASS INDEX: 32.86 KG/M2 | DIASTOLIC BLOOD PRESSURE: 82 MMHG | RESPIRATION RATE: 16 BRPM | HEIGHT: 63 IN | WEIGHT: 185.44 LBS | SYSTOLIC BLOOD PRESSURE: 144 MMHG

## 2017-08-17 DIAGNOSIS — R19.4 CHANGE IN BOWEL HABITS: ICD-10-CM

## 2017-08-17 DIAGNOSIS — R63.4 WEIGHT LOSS: ICD-10-CM

## 2017-08-17 DIAGNOSIS — R10.816 EPIGASTRIC ABDOMINAL TENDERNESS WITHOUT REBOUND TENDERNESS: ICD-10-CM

## 2017-08-17 DIAGNOSIS — R10.13 EPIGASTRIC PAIN: ICD-10-CM

## 2017-08-17 DIAGNOSIS — R19.7 DIARRHEA, UNSPECIFIED TYPE: ICD-10-CM

## 2017-08-17 DIAGNOSIS — R19.8 IRREGULAR BOWEL HABITS: ICD-10-CM

## 2017-08-17 DIAGNOSIS — Z80.0 FAMILY HISTORY OF COLON CANCER: ICD-10-CM

## 2017-08-17 DIAGNOSIS — R11.2 NON-INTRACTABLE VOMITING WITH NAUSEA, UNSPECIFIED VOMITING TYPE: Primary | ICD-10-CM

## 2017-08-17 LAB
AMYLASE SERPL-CCNC: 142 U/L
BASOPHILS # BLD AUTO: 0.12 K/UL
BASOPHILS NFR BLD: 0.9 %
DIFFERENTIAL METHOD: ABNORMAL
EOSINOPHIL # BLD AUTO: 0.7 K/UL
EOSINOPHIL NFR BLD: 5.2 %
ERYTHROCYTE [DISTWIDTH] IN BLOOD BY AUTOMATED COUNT: 13.8 %
HCT VFR BLD AUTO: 35.7 %
HGB BLD-MCNC: 11.9 G/DL
IGA SERPL-MCNC: 367 MG/DL
LIPASE SERPL-CCNC: 136 U/L
LYMPHOCYTES # BLD AUTO: 4.8 K/UL
LYMPHOCYTES NFR BLD: 37.6 %
MCH RBC QN AUTO: 28.5 PG
MCHC RBC AUTO-ENTMCNC: 33.3 G/DL
MCV RBC AUTO: 85 FL
MONOCYTES # BLD AUTO: 0.6 K/UL
MONOCYTES NFR BLD: 5 %
NEUTROPHILS # BLD AUTO: 6.5 K/UL
NEUTROPHILS NFR BLD: 51.1 %
PLATELET # BLD AUTO: 450 K/UL
PMV BLD AUTO: 10 FL
RBC # BLD AUTO: 4.18 M/UL
TSH SERPL DL<=0.005 MIU/L-ACNC: 0.71 UIU/ML
WBC # BLD AUTO: 12.69 K/UL

## 2017-08-17 PROCEDURE — 99999 PR PBB SHADOW E&M-EST. PATIENT-LVL IV: CPT | Mod: PBBFAC,,, | Performed by: NURSE PRACTITIONER

## 2017-08-17 PROCEDURE — 99214 OFFICE O/P EST MOD 30 MIN: CPT | Mod: S$PBB,,, | Performed by: NURSE PRACTITIONER

## 2017-08-17 PROCEDURE — 82150 ASSAY OF AMYLASE: CPT

## 2017-08-17 PROCEDURE — 83516 IMMUNOASSAY NONANTIBODY: CPT

## 2017-08-17 PROCEDURE — 83690 ASSAY OF LIPASE: CPT

## 2017-08-17 PROCEDURE — 3079F DIAST BP 80-89 MM HG: CPT | Mod: ,,, | Performed by: NURSE PRACTITIONER

## 2017-08-17 PROCEDURE — 99214 OFFICE O/P EST MOD 30 MIN: CPT | Mod: PBBFAC,PO | Performed by: NURSE PRACTITIONER

## 2017-08-17 PROCEDURE — 84443 ASSAY THYROID STIM HORMONE: CPT

## 2017-08-17 PROCEDURE — 36415 COLL VENOUS BLD VENIPUNCTURE: CPT | Mod: PO

## 2017-08-17 PROCEDURE — 85025 COMPLETE CBC W/AUTO DIFF WBC: CPT

## 2017-08-17 PROCEDURE — 82784 ASSAY IGA/IGD/IGG/IGM EACH: CPT

## 2017-08-17 PROCEDURE — 3077F SYST BP >= 140 MM HG: CPT | Mod: ,,, | Performed by: NURSE PRACTITIONER

## 2017-08-17 RX ORDER — PROMETHAZINE HYDROCHLORIDE 25 MG/1
25 TABLET ORAL EVERY 6 HOURS PRN
Qty: 30 TABLET | Refills: 1 | Status: SHIPPED | OUTPATIENT
Start: 2017-08-17 | End: 2018-02-26

## 2017-08-17 NOTE — LETTER
August 17, 2017      Reyna Weaver, NP  [address] [facility] [address] [place] [address]           [facility]  [address] [facility] [place] [address]  Phone: [phone]          Patient: Amanda Smith   MR Number: [id number]   YOB: 1950   Date of Visit: 8/17/2017       Dear Reyna Weaver:    Thank you for referring Amanda Smith to me for evaluation. Attached you will find relevant portions of my assessment and plan of care.    If you have questions, please do not hesitate to call me. I look forward to following Amanda Smith along with you.    Sincerely,    Stacey Allen, [facility]    Enclosure  CC:  No Recipients    If you would like to receive this communication electronically, please contact [email or web address] or [phone] to request more information on [employer] access.    For providers and/or their staff who would like to refer a patient to [facility], please contact us through our one-stop-shop provider referral line, [facility], at [phone].    If you feel you have received this communication in error or would no longer like to receive these types of communications, please e-mail [email or web address]

## 2017-08-17 NOTE — PATIENT INSTRUCTIONS
Uncertain Causes of Diarrhea (Adult)    Diarrhea is when stools are loose and watery. This can be caused by:  · Viral infections  · Bacterial infections  · Food poisoning  · Parasites  · Irritable bowel syndrome (IBS)  · Inflammatory bowel diseases such as ulcerative colitis, Crohn's disease, and celiac disease  · Food intolerance, such as to lactose, the sugar found in milk and milk products  · Reaction to medicines like antibiotics, laxatives, cancer drugs, and antacids  Along with diarrhea, you may also have:  · Abdominal pain and cramping  · Nausea and vomiting  · Loss of bowel control  · Fever and chills  · Bloody stools  In some cases, antibiotics may help to treat diarrhea. You may have a stool sample test. This is done to see what is causing your diarrhea, and if antibiotics will help treat it. The results of a stool sample test may take up to 2 days. The healthcare provider may not give you antibiotics until he or she has the stool test results.  Diarrhea can cause dehydration. This is the loss of too much water and other fluids from the body. When this occurs, body fluid must be replaced. This can be done with oral rehydration solutions. Oral rehydration solutions are available at drugstores and grocery stores without a prescription.  Home care  Follow all instructions given by your healthcare provider. Rest at home for the next 24 hours, or until you feel better. Avoid caffeine, tobacco, and alcohol. These can make diarrhea, cramping, and pain worse.  If taking medicines:  · Dont take over-the-counter diarrhea or nausea medicines unless your healthcare provider tells you to.  · You may use acetaminophen or NSAID medicines like ibuprofen or naproxen to reduce pain and fever. Dont use these if you have chronic liver or kidney disease, or ever had a stomach ulcer or gastrointestinal bleeding. Don't use NSAID medicines if you are already taking one for another condition (like arthritis) or are on daily  aspirin therapy (such as for heart disease or after a stroke). Talk with your healthcare provider first.  · If antibiotics were prescribed, be sure you take them until they are finished. Dont stop taking them even when you feel better. Antibiotics must be taken as a full course.  To prevent the spread of illness:  · Remember that washing with soap and water and using alcohol-based  is the best way to prevent the spread of infection.  · Clean the toilet after each use.  · Wash your hands before eating.  · Wash your hands before and after preparing food. Keep in mind that people with diarrhea or vomiting should not prepare food for others.  · Wash your hands after using cutting boards, countertops, and knives that have been in contact with raw foods.  · Wash and then peel fruits and vegetables.  · Keep uncooked meats away from cooked and ready-to-eat foods.  · Use a food thermometer when cooking. Cook poultry to at least 165°F (74°C). Cook ground meat (beef, veal, pork, lamb) to at least 160°F (71°C). Cook fresh beef, veal, lamb, and pork to at least 145°F (63°C).  · Dont eat raw or undercooked eggs (poached or rajan side up), poultry, meat, or unpasteurized milk and juices.  Food and drinks  The main goal while treating vomiting or diarrhea is to prevent dehydration. This is done by taking small amounts of liquids often.  · Keep in mind that liquids are more important than food right now.  · Drink only small amounts of liquids at a time.  · Dont force yourself to eat, especially if you are having cramping, vomiting, or diarrhea. Dont eat large amounts at a time, even if you are hungry.  · If you eat, avoid fatty, greasy, spicy, or fried foods.  · Dont eat dairy foods or drink milk if you have diarrhea. These can make diarrhea worse.  During the first 24 hours you can try:  · Oral rehydration solutions. Do not use sports drinks. They have too much sugar and not enough electrolytes.  · Soft drinks without  caffeine  · Ginger ale  · Water (plain or flavored)  · Decaf tea or coffee  · Clear broth, consommé, or bouillon  · Gelatin, popsicles, or frozen fruit juice bars  The second 24 hours, if you are feeling better, you can add:  · Hot cereal, plain toast, bread, rolls, or crackers  · Plain noodles, rice, mashed potatoes, chicken noodle soup, or rice soup  · Unsweetened canned fruit (no pineapple)  · Bananas  As you recover:  · Limit fat intake to less than 15 grams per day. Dont eat margarine, butter, oils, mayonnaise, sauces, gravies, fried foods, peanut butter, meat, poultry, or fish.  · Limit fiber. Dont eat raw or cooked vegetables, fresh fruits except bananas, or bran cereals.  · Limit caffeine and chocolate.  · Limit dairy.  · Dont use spices or seasonings except salt.  · Go back to your normal diet over time, as you feel better and your symptoms improve.  · If the symptoms come back, go back to a simple diet or clear liquids.  Follow-up care  Follow up with your healthcare provider, or as advised. If a stool sample was taken or cultures were done, call the healthcare provider for the results as instructed.  Call 911  Call 911 if you have any of these symptoms:  · Trouble breathing  · Confusion  · Extreme drowsiness or trouble walking  · Loss of consciousness  · Rapid heart rate  · Chest pain  · Stiff neck  · Seizure  When to seek medical advice  Call your healthcare provider right away if any of these occur:  · Abdominal pain that gets worse  · Constant lower right abdominal pain  · Continued vomiting and inability to keep liquids down  · Diarrhea more than 5 times a day  · Blood in vomit or stool  · Dark urine or no urine for 8 hours, dry mouth and tongue, tiredness, weakness, or dizziness  · Drowsiness  · New rash  · You dont get better in 2 to 3 days  · Fever of 100.4°F (38°C) or higher that doesnt get lower with medicine  Date Last Reviewed: 1/3/2016  © 5650-0348 [employer]. [address] [place] [address]  "[facility], [place] [address]. All rights reserved. This information is not intended as a substitute for professional medical care. Always follow your healthcare professional's instructions.        How to Control Nausea and Vomiting     Taken before meals, medicines can help ease nausea.    Nausea is feeling that you need to throw up. Throwing up occurs when your body forces food that is in your stomach out through your mouth. Nausea and vomiting are symptoms that are caused by many things. They can happen when a condition or disease, medicine, medical treatment, or a poisonous substance affects the area in your brain that controls vomiting. Some conditions or diseases can cause nausea, abdominal pain or cramps, and vomiting. The symptoms can be mild and go away by themselves. Other symptoms can be serious. You will need to see your healthcare provider for these.  Nausea and vomiting are common. They can be caused by many things. These include:  · "Stomach flu" (gastroenteritis)  · Food poisoning  · Stomach pain (gastritis)  · Blockages  They can also be caused by a head injury, an infection in the brain or inside the ear, or migraines. Other common causes of nausea and vomiting include:  · Brain tumor  · Brain bruise  · Motion sickness  · Drugs. These include alcohol, pain medicines such as morphine, and cancer medicines.  · Toxins. These are poisonous things like plants or liquids that are swallowed by accident.  · Advanced types of cancer  · Movement problems (psychogenic problems)  · Extra pressure in the fluid that surrounds the brain and spinal cord (elevated intracranial pressure)     Nausea and vomiting are also common side effects of chemotherapy and radiation therapy. Side effects happen when treatment changes some normal cells as well as cancer cells. In this case, the cells lining your stomach and the part of your brain that controls vomiting are affected. Other more serious causes of vomiting may be hard " to find early in the illness.     When to seek medical advice  Call your healthcare provider right away if you have the following:  · Nausea or vomiting that lasts 24 hours or more  · Trouble keeping fluids down   Medicines can help  Nausea or vomiting can often be prevented or controlled with medicines (antiemetics). Your doctor may give you antiemetics before or after treatment if you are getting chemotherapy or other medical treatments that cause nausea or vomiting.  Eating tips  · If you have medicines to control nausea, take them before meals as directed.  · Avoid fatty or greasy foods while nauseated.  · Eat small meals slowly throughout the day.  · Ask someone to sit with you while you eat to keep you from thinking about feeling nauseated.  · Eat foods at room temperature or colder to avoid strong smells.  · Eat dry foods, such as toast, crackers, or pretzels. Also eat cool, light foods, such as applesauce, and bland foods, such as oatmeal or skinned chicken.   Other ways to feel better  · Get a little fresh air. Take a short walk.  · Talk to a friend, listen to music, or watch TV.  · Take a few deep, slow breaths.  · Eat by candlelight or in surroundings that you find relaxing.  · Use a technique, such as guided imagery, to help you relax. Imagine yourself in a beautiful, restful scene. Or daydream about the place youd most like to be.  Date Last Reviewed: 1/6/2016  © 0570-6443 [employer]. [address] [place] [address], [place] [address]. All rights reserved. This information is not intended as a substitute for professional medical care. Always follow your healthcare professional's instructions.        Gastroparesis     Gastroparesis means that food and fluids move too slowly out of the stomach into the duodenum.   Gastroparesis (also called delayed gastric emptying) happens when the stomach takes longer than normal to empty of food. This is due to a problem with motility (the movement of the  muscles in the digestive tract). For many people, gastroparesis is a lifelong condition. But treatment can help relieve symptoms and prevent complications. Read on to learn more about gastroparesis and how it can be managed.  How gastroparesis develops  With normal motility, signals from nerves tell the stomach muscles when to contract. These muscles move food from the stomach into the duodenum (the first part of the small bowel). With gastroparesis, the nerves or muscles are damaged. This causes motility to slow down or stop completely. As a result, food cannot move from the stomach properly. This delayed emptying can cause nausea, vomiting, and other symptoms. Malnutrition can result. Bezoars (hardened lumps of food) can form in the stomach and cause other complications as well.  Causes of gastroparesis  Gastroparesis can be caused by any of the following:  · Diabetes  · Surgery involving any of the digestive organs, such as the stomach and bowels  · Certain medicines, such as strong pain medicines (narcotics)  · Certain conditions, such as systemic scleroderma, Parkinson disease, and thyroid disease  · After a viral illness  In many cases, the cause of gastroparesis cannot be found.  Signs and symptoms of gastroparesis  These can include:  · Nausea and vomiting  · Feeling full quickly when eating  · Belly pain  · Heartburn  · Belly bloating  · Weight loss  · Loss of appetite  · High and low blood sugar levels (in people with diabetes)  Diagnosing gastroparesis  Your healthcare provider will ask about your symptoms and health history. Youll also be examined. In addition, blood tests and X-rays are often done to check your health and rule out other problems. To confirm the problem, you may need other tests as well. These can include:  · Upper endoscopy. This is done to see inside the stomach and duodenum. For the test, an endoscope is used. This is a thin, flexible tube with a tiny camera on the end. Its inserted  through the mouth and down into the stomach and duodenum.  · Upper gastrointestinal (GI) series. This is done to take X-rays of the upper GI tract from the mouth to the small bowel. For the test, a substance called barium is used. The barium coats the upper GI tract so that it will show up clearly on X-rays.  · Gastric emptying scan. This is done to measure how quickly food leaves the stomach. For the test, a meal containing a harmless radioactive substance (tracer) is eaten. Then scans of the stomach are done. The tracer shows up clearly on the scans and shows the movement of the food through the stomach.  · Antroduodenal manometry. This test gives pressure measurements of the stomach and small intestine to check how the contractions are working.  · Newer tests. These are being created and include breath tests and wireless capsule studies   Treating gastroparesis  The goal of treatment is to help you manage your condition. Treatment may include one or more of the following:  · Dietary changes. You may need to make changes to your eating habits and daily diet. For instance, your healthcare provider may instruct you to eat small meals throughout the day. Doing this can keep you from feeling full too quickly. You may be placed on a liquid or soft diet. This means youll eat liquid foods or foods that are mashed or put through a . In addition, you may need to avoid foods high in fats and fiber. These can slow digestion. For more help with your diet, your healthcare provider can refer you to a dietitian. In severe cases, you may need a feeding tube. This sends liquid food or medicine directly to your small bowel, bypassing the stomach.  · Treating diabetes. If you are diabetic, it is important to control your blood sugar. High sugar levels worsen gastroparesis.   · Medicines. These can help manage symptoms, such as nausea and vomiting. They can also improve motility. Each medicine has specific risks and side  effects. Your doctor can tell you more about any medicine that is prescribed for you.  · Surgery. You may need to have a tube surgically inserted into the stomach. The tube removes excess air and fluid. This can relieve severe symptoms of nausea and vomiting. In rare cases, other surgery may be needed on the stomach or small bowel. This is to create a new passageway for food to be emptied from the stomach.  · Gastric electrical stimulation. This treatment is done less often and may not be available. Your healthcare provider can tell you more about this treatment if it is a choice for you.  Diabetes and gastroparesis  If you have diabetes, gastroparesis can make it harder to manage your blood sugar level. Youll need to take extra steps in your treatment to prevent complications. Work with your healthcare provider to learn what you can do to protect your health. For more information, contact the American Diabetes Association, www.diabetes.org.   Long-term concerns   With treatment, most people can manage their symptoms and maintain their usual routines. If your symptoms are moderate to severe, you may need to see your healthcare provider more often for checkups. Also, other treatments will likely be needed.  Date Last Reviewed: 7/14/2016  © 0963-9979 [employer]. [address] [place] [address], [place] [address]. All rights reserved. This information is not intended as a substitute for professional medical care. Always follow your healthcare professional's instructions.        Eating a High-Fiber Diet  Fiber is what gives strength and structure to plants. Most grains, beans, vegetables, and fruits contain fiber. Foods rich in fiber are often low in calories and fat, and they fill you up more. They may also reduce your risks for certain health problems. To find out the amount of fiber in canned, packaged, or frozen foods, read the Nutrition Facts label. It tells you how much fiber is in a serving.    Types of  fiber and their benefits  There are two types of fiber: insoluble and soluble. They both aid digestion and help you maintain a healthy weight.  · Insoluble fiber. This is found in whole grains, cereals, certain fruits and vegetables such as apple skin, corn, and carrots. Insoluble fiber may prevent constipation and reduce the risk for certain types of cancer.  · Soluble fiber. This type of fiber is in oats, beans, and certain fruits and vegetables such as strawberries and peas. Soluble fiber can reduce cholesterol, which may help lower the risk for heart disease. It also helps control blood sugar levels.  Look for high-fiber foods  Try these foods to add fiber to your diet:  · Whole-grain breads and cereals. Try to eat 6 to 8 ounces a day. Include wheat and oat bran cereals, whole-wheat muffins or toast, and corn tortillas in your meals.  · Fruits. Try to eat 2 cups a day. Apples, oranges, strawberries, pears, and bananas are good sources. (Note: Fruit juice is low in fiber.)  · Vegetables. Try to eat at least 2.5 cups a day. Add asparagus, carrots, broccoli, peas, and corn to your meals.  · Beans. One cup of cooked lentils gives you over 15 grams of fiber. Try navy beans, lentils, and chickpeas.  · Seeds. A small handful of seeds gives you about 3 grams of fiber. Try sunflower seeds.  Keep track of your fiber  Keep track of how much fiber you eat. Start by reading food labels. Then eat a variety of foods high in fiber. As you begin to eat more fiber, ask your healthcare provider how much water you should be drinking to keep your digestive system working smoothly.  You should aim for a certain amount of fiber in your diet each day. If you are a woman, that amount is between 25 and 28 grams per day. Men should aim for 30 to 33 grams per day. After age 50, your daily fiber needs drop to 22 grams for women and 28 grams for men.  Before you reach for the fiber supplements, think about this. Fiber is found naturally in  healthy whole foods. It gives you that feeling of fullness after you eat. Taking fiber supplements or eating fiber-enriched foods will not give you this full feeling.  Your fiber intake is a good measure for the quality of your overall diet. If you are missing out on your daily amount of fiber, you may be lacking other important nutrients as well.  Date Last Reviewed: 5/11/2015  © 4090-9726 [employer]. [address] [place] [address], [place] [address]. All rights reserved. This information is not intended as a substitute for professional medical care. Always follow your healthcare professional's instructions.

## 2017-08-17 NOTE — PROGRESS NOTES
Subjective:       Patient ID: Amanda Smith is a 66 y.o. female Body mass index is 32.84 kg/m².    Chief Complaint: Advice Only and Diarrhea (n/v)    This patient is new to me.  Referring Provider:  MASSIEL Weaver NP for nausea, vomiting and diarrhea  Established patient of Dr. Shen.    GI Problem   The primary symptoms include weight loss (lost 40 lbs since 12/2016- decreased appetite and decreased food intake due to nausea), nausea, vomiting and diarrhea. Primary symptoms do not include fever, fatigue, abdominal pain, melena, hematemesis, hematochezia or dysuria. The illness began more than 7 days ago (started 12/2016).   The vomiting began more than 2 days ago (started 12/2016). Frequency: 3-4 times a day when episodes of diarrhea occurs which is once every other week. The emesis contains stomach contents, undigested food and bilious material (relieved with phenergan- needs refill).   The diarrhea began more than 1 week ago (started 12/2016). Daily occurrences: not daily; has episodes of diarrhea that last 2-5 days of 10 loose stools a day; when diarrhea is not present then constipation; diarrhea episodes occur every other week. Risk factors for illness producing diarrhea include recent antibiotic use (recent antibiotic and hospitalization for blood clot in lung 2 days after EGD- reports clot was gone before discharge (has seen PCP for follow-up)).   The illness is also significant for constipation (chronic in 1979). The illness does not include chills, dysphagia or odynophagia. Significant associated medical issues include GERD (controlled with omeprazole 40 mg once daily). Associated medical issues do not include inflammatory bowel disease.     Review of Systems   Constitutional: Positive for appetite change (decreased, eating small snacks; some days not eating anything; trying to drink fluids) and weight loss (lost 40 lbs since 12/2016- decreased appetite and decreased food intake due to nausea).  Negative for chills, fatigue, fever and unexpected weight change.   HENT: Negative for sore throat and trouble swallowing.    Respiratory: Negative for cough, choking and shortness of breath.    Cardiovascular: Negative for chest pain.   Gastrointestinal: Positive for constipation (chronic in ), diarrhea, nausea and vomiting. Negative for abdominal pain, anal bleeding, blood in stool, dysphagia, hematemesis, hematochezia, melena and rectal pain.   Genitourinary: Negative for difficulty urinating, dysuria, flank pain, frequency, pelvic pain and urgency.   Neurological: Negative for weakness.       Past Medical History:   Diagnosis Date    Anemia of chronic disease 2015    Anxiety     Blood transfusion     Cataract     Chronic kidney disease     Depression     Fibromyalgia     Former smoker     GERD (gastroesophageal reflux disease)     Hypertension     Kidney stones     Renal failure     Temporal arteritis      Past Surgical History:   Procedure Laterality Date    BACK SURGERY      x5     Brest tumor removal      cervical fusion\       SECTION      CHOLECYSTECTOMY      COLONOSCOPY  ~    unsure of who did it; internal hemorrhoids, denies colon polyps in the past    HYSTERECTOMY      ovaries removed    MRSA      neck and arm - mosquito bite     UPPER GASTROINTESTINAL ENDOSCOPY  2017    Dr. Shen     Family History   Problem Relation Age of Onset    Diabetes Mother     Cancer Father      Brain    Heart disease Sister     Diabetes Brother     Heart disease Brother     Colon cancer Brother 55    Diabetes Maternal Grandmother     Heart disease Maternal Grandfather     GI problems Grandchild      trying to determine if she has crohn's    Crohn's disease Neg Hx     Ulcerative colitis Neg Hx      Wt Readings from Last 10 Encounters:   17 84.1 kg (185 lb 6.5 oz)   17 83.8 kg (184 lb 11.9 oz)   07/10/17 84.2 kg (185 lb 10 oz)   17 81.6 kg (180 lb)    05/23/17 86.9 kg (191 lb 9.3 oz)   04/24/17 86.8 kg (191 lb 5.8 oz)   04/10/17 88 kg (194 lb 0.1 oz)   09/14/16 85.5 kg (188 lb 7.9 oz)   11/30/15 92.7 kg (204 lb 5.9 oz)   05/15/15 93.8 kg (206 lb 14.4 oz)     Lab Results   Component Value Date    WBC 15.35 (H) 04/10/2017    HGB 10.4 (L) 04/10/2017    HCT 30.7 (L) 04/10/2017    MCV 89 04/10/2017     (H) 04/10/2017     CMP  Sodium   Date Value Ref Range Status   05/23/2017 141 136 - 145 mmol/L Final     Potassium   Date Value Ref Range Status   05/23/2017 4.4 3.5 - 5.1 mmol/L Final     Chloride   Date Value Ref Range Status   05/23/2017 110 95 - 110 mmol/L Final     CO2   Date Value Ref Range Status   05/23/2017 23 23 - 29 mmol/L Final     Glucose   Date Value Ref Range Status   05/23/2017 100 70 - 110 mg/dL Final     BUN, Bld   Date Value Ref Range Status   05/23/2017 31 (H) 8 - 23 mg/dL Final     Creatinine   Date Value Ref Range Status   05/23/2017 1.6 (H) 0.5 - 1.4 mg/dL Final     Calcium   Date Value Ref Range Status   05/23/2017 9.9 8.7 - 10.5 mg/dL Final     Total Protein   Date Value Ref Range Status   04/10/2017 7.4 6.0 - 8.4 g/dL Final     Albumin   Date Value Ref Range Status   05/23/2017 3.5 3.5 - 5.2 g/dL Final     Total Bilirubin   Date Value Ref Range Status   04/10/2017 0.2 0.1 - 1.0 mg/dL Final     Comment:     For infants and newborns, interpretation of results should be based  on gestational age, weight and in agreement with clinical  observations.  Premature Infant recommended reference ranges:  Up to 24 hours.............<8.0 mg/dL  Up to 48 hours............<12.0 mg/dL  3-5 days..................<15.0 mg/dL  6-29 days.................<15.0 mg/dL       Alkaline Phosphatase   Date Value Ref Range Status   04/10/2017 104 55 - 135 U/L Final     [facility]   Date Value Ref Range Status   11/30/2015 21 14 - 36 U/L Final     AST   Date Value Ref Range Status   04/10/2017 16 10 - 40 U/L Final     ALT   Date Value Ref Range Status  "  04/10/2017 17 10 - 44 U/L Final     Anion Gap   Date Value Ref Range Status   05/23/2017 8 8 - 16 mmol/L Final     eGFR if    Date Value Ref Range Status   05/23/2017 39.0 (A) >60 mL/min/1.73 m^2 Final     eGFR if non    Date Value Ref Range Status   05/23/2017 33.8 (A) >60 mL/min/1.73 m^2 Final     Comment:     Calculation used to obtain the estimated glomerular filtration  rate (eGFR) is the CKD-EPI equation. Since race is unknown   in our information system, the eGFR values for   -American and Non--American patients are given   for each creatinine result.       Lab Results   Component Value Date    AMYLASE 106 04/03/2006     Lab Results   Component Value Date    TSH 0.524 11/30/2015     Reviewed prior medical records including 4/24/17 stool studies (negative), & endoscopy history (see surgical history).     6/8/17 EGD was reviewed and procedure report states:   " Impression:          - Normal esophagus.                       - Gastritis. Biopsied.                       - Food (residue) in the stomach.                       - Normal examined duodenum. Biopsied.  Recommendation:      - Await pathology and Marjorie test results.                       - Continue present medications.                       - Discharge patient to home (ambulatory).   Biopsy results:   "1. STOMACH (BIOPSY):  Antral mucosa with reactive/regenerative changes  Oxyntic mucosa with no significant histopathologic changes  No active inflammation  No Helicobacter organisms (routine and immunostain)  2. DUODENUM (BIOPSY):  Duodenal mucosa with no significant histopathologic changes"  Objective:      Physical Exam   Constitutional: She is oriented to person, place, and time. She appears well-developed and well-nourished. No distress.   HENT:   Mouth/Throat: Oropharynx is clear and moist and mucous membranes are normal. No oral lesions. No oropharyngeal exudate.   Eyes: Conjunctivae are normal. Pupils are " equal, round, and reactive to light. No scleral icterus.   Neck: Neck supple. No tracheal deviation present.   Cardiovascular: Normal rate.    Pulmonary/Chest: Effort normal and breath sounds normal. No respiratory distress. She has no wheezes.   Abdominal: Soft. Normal appearance and bowel sounds are normal. She exhibits no distension, no abdominal bruit and no mass. There is no hepatosplenomegaly. There is tenderness (mild) in the epigastric area. There is no rigidity, no rebound, no guarding, no tenderness at McBurney's point and negative Reilly's sign. No hernia.   Lymphadenopathy:     She has no cervical adenopathy.   Neurological: She is alert and oriented to person, place, and time.   Skin: Skin is warm and dry. No rash noted. She is not diaphoretic. No erythema. No pallor.   Non-jaundiced   Psychiatric: She has a normal mood and affect. Her behavior is normal.   Nursing note and vitals reviewed.      Assessment:       1. Non-intractable vomiting with nausea, unspecified vomiting type    2. Diarrhea, unspecified type    3. Epigastric abdominal tenderness without rebound tenderness    4. Family history of colon cancer    5. Irregular bowel habits    6. Change in bowel habits    7. Weight loss        Plan:       Non-intractable vomiting with nausea, unspecified vomiting type  -     NM Gastric Emptying; Future; Expected date: 08/17/2017  - REFILL    promethazine (PHENERGAN) 25 MG tablet; Take 1 tablet (25 mg total) by mouth every 6 (six) hours as needed for Nausea.  Dispense: 30 tablet; Refill: 1  -     US Abdomen Complete; Future; Expected date: 08/17/2017  Recommended small frequent meals instead of 3 big meals a day, low fat meals, avoid high fiber (insoluble fiber), red meats, dairy, and non-digestible solids (peels, fruit pulp, etc). Avoid NSAIDs and narcotics. Recommend low residue diet.    Diarrhea, unspecified type  -     CBC auto differential; Future; Expected date: 08/17/2017  -     IgA; Future;  Expected date: 08/17/2017  -     Tissue transglutaminase, IgA; Future; Expected date: 08/17/2017  -     Adenovirus Antigen EIA, Stool; Future; Expected date: 08/17/2017  -     Fecal fat, qualitative; Future; Expected date: 08/17/2017  -     Giardia / Cryptosporidum, EIA; Future; Expected date: 08/17/2017  -     Occult blood x 1, stool; Future; Expected date: 08/17/2017  -     pH, stool; Future; Expected date: 08/17/2017  -     Rotavirus antigen, stool; Future; Expected date: 08/17/2017  -     Stool Exam-Ova,Cysts,Parasites; Future; Expected date: 08/17/2017  -     WBC, Stool; Future; Expected date: 08/17/2017  -     Stool culture; Future; Expected date: 08/17/2017  -     Clostridium difficile EIA; Future; Expected date: 08/17/2017  - schedule Colonoscopy, discussed procedure with the patient, verbalized understanding  - recommended OTC probiotics, such as Align or Culturelle, as directed  - avoid lactose    Epigastric abdominal tenderness without rebound tenderness  -     CBC auto differential; Future; Expected date: 08/17/2017  -     Amylase; Future; Expected date: 08/17/2017  -     Lipase; Future; Expected date: 08/17/2017  -     US Abdomen Complete; Future; Expected date: 08/17/2017    Family history of colon cancer  - schedule Colonoscopy, discussed procedure with the patient, verbalized understanding    Irregular bowel habits & Change in bowel habits  -     TSH; Future; Expected date: 08/17/2017  - schedule Colonoscopy, discussed procedure with the patient, verbalized understanding  - discussed with patient that a side effect of narcotic pain medications is constipation, advised patient to talk to provider who manages pain medication and to try to stop or decrease use of narcotics, patient verbalized understanding    Weight loss  -     TSH; Future; Expected date: 08/17/2017  - schedule Colonoscopy, discussed procedure with the patient, verbalized understanding  - encouraged PO intake and daily calorie counts to  ensure adequate nutrition is taken in, recommend at least 2,000 calories a day  - recommend nutritional drinks, such as Boost, Ensure or Glucerna, to supplement nutrition needs  - possible ct scan if symptoms persist and pending results of testing    Return in about 2 months (around 10/17/2017), or if symptoms worsen or fail to improve.      If no improvement in symptoms or symptoms worsen, call/follow-up at clinic or go to ER.

## 2017-08-21 ENCOUNTER — TELEPHONE (OUTPATIENT)
Dept: GASTROENTEROLOGY | Facility: CLINIC | Age: 67
End: 2017-08-21

## 2017-08-21 ENCOUNTER — HOSPITAL ENCOUNTER (OUTPATIENT)
Dept: RADIOLOGY | Facility: HOSPITAL | Age: 67
Discharge: HOME | End: 2017-08-21
Attending: NURSE PRACTITIONER
Payer: MEDICARE

## 2017-08-21 DIAGNOSIS — R10.816 EPIGASTRIC ABDOMINAL TENDERNESS WITHOUT REBOUND TENDERNESS: ICD-10-CM

## 2017-08-21 DIAGNOSIS — R11.2 NON-INTRACTABLE VOMITING WITH NAUSEA, UNSPECIFIED VOMITING TYPE: Primary | ICD-10-CM

## 2017-08-21 DIAGNOSIS — R74.8 ELEVATED PANCREATIC ENZYME: ICD-10-CM

## 2017-08-21 DIAGNOSIS — R11.2 NON-INTRACTABLE VOMITING WITH NAUSEA, UNSPECIFIED VOMITING TYPE: ICD-10-CM

## 2017-08-21 DIAGNOSIS — Z90.49 S/P CHOLECYSTECTOMY: ICD-10-CM

## 2017-08-21 DIAGNOSIS — K83.8 COMMON BILE DUCT DILATATION: ICD-10-CM

## 2017-08-21 DIAGNOSIS — R63.4 WEIGHT LOSS: ICD-10-CM

## 2017-08-21 LAB — TTG IGA SER IA-ACNC: 6 UNITS

## 2017-08-21 PROCEDURE — 76700 US EXAM ABDOM COMPLETE: CPT | Mod: TC,PO

## 2017-08-21 PROCEDURE — 76700 US EXAM ABDOM COMPLETE: CPT | Mod: 26,,, | Performed by: RADIOLOGY

## 2017-08-21 NOTE — TELEPHONE ENCOUNTER
Please call to inform & review the results with the patient- abdominal ultrasound showed moderate dilation of the common bile duct which is nonspecific and significance is uncertain since it may be related to prior episode of obstruction, cyst and some times mild dilation can be due to history of cholecystectomy. I recommend checking blood work for liver function levels, CT scan, and MRCP imaging to further evaluate this finding.  Please verify that the patient does not have a pacemaker/defibrillator, metal implant, cerebral aneurysm or surgical clip, pump, middle or inner ear prosthetic, or nerve or brain stimulator, if so, please notify me so I can adjust the order accordingly. Please ask if patient is claustrophobic, if so, let me know.  Other findings show signs of chronic renal disease and bilateral kidney cyst note which are unchanged compared to previous imaging- recommend follow-up with nephrologist for continued evaluation and management.  Blood work showed negative for celiac, normal thyroid function level, mild elevations in pancreatic enzymes, which this may indicate inflammation of the pancreas. Pancreatitis usually shows elevations in these levels by 3 times the upper normal limit level (your levels weren't this high). Elevations can also be due to other causes such as digestive conditions. Recommend abdominal imaging as above, repeat levels in 4 weeks and stay well-hydrated, avoid alcohol, recommend small meals of high protein & low fat diet, & activity as tolerated. If no improvement in symptoms or symptoms worsen, call/follow-up at clinic or go to ER. Blood counts are stable with mild anemia which has improved since last blood work 4 months ago; also noted elevated serum IgA level (this is an immune system level)- Recommend follow-up with Primary Care Provider for continued evaluation and management.    Continue with previous recommendations and schedule gastric emptying study as well.  Please  release results to patient's mychart once you have discussed results and recommendations with patient.  Thanks,  Stacey GODDARD

## 2017-08-23 NOTE — TELEPHONE ENCOUNTER
Pt informed of results and recommendations. Verbalized understanding. Declined scheduling MRCP and CT at this time.

## 2017-08-23 NOTE — TELEPHONE ENCOUNTER
----- Message from Kellie Mcintyre sent at 8/22/2017  1:18 PM CDT -----  Contact: self  Had tests done yesterday and would like the results.  Please call back at [phone] (qgju)

## 2017-08-25 ENCOUNTER — TELEPHONE (OUTPATIENT)
Dept: NEPHROLOGY | Facility: CLINIC | Age: 67
End: 2017-08-25

## 2017-08-25 NOTE — TELEPHONE ENCOUNTER
Spoke with patient who wished to rescheduled Monday's appt.  Changed nephrology appt to Thursday 8/31 per her request.  Unable to schedule nurse visit appointment for HTN.      I told patient I will forward to [facility] staff for rescheduling.

## 2017-08-28 ENCOUNTER — TELEPHONE (OUTPATIENT)
Dept: NEPHROLOGY | Facility: CLINIC | Age: 67
End: 2017-08-28

## 2017-08-28 NOTE — TELEPHONE ENCOUNTER
----- Message from Kellie Mcintyre sent at 8/28/2017 10:51 AM CDT -----  Contact: self  Returning your call.  Please call back at [phone] (home)

## 2017-08-28 NOTE — TELEPHONE ENCOUNTER
Spoke with pt, notified that nurse visit was changed to the 31st as requested. Pt verbally understands

## 2017-08-31 ENCOUNTER — TELEPHONE (OUTPATIENT)
Dept: ENDOSCOPY | Facility: HOSPITAL | Age: 67
End: 2017-08-31

## 2017-08-31 ENCOUNTER — CLINICAL SUPPORT (OUTPATIENT)
Dept: FAMILY MEDICINE | Facility: CLINIC | Age: 67
End: 2017-08-31
Payer: MEDICARE

## 2017-08-31 ENCOUNTER — OFFICE VISIT (OUTPATIENT)
Dept: NEPHROLOGY | Facility: CLINIC | Age: 67
End: 2017-08-31
Payer: MEDICARE

## 2017-08-31 VITALS
WEIGHT: 186.94 LBS | HEART RATE: 62 BPM | DIASTOLIC BLOOD PRESSURE: 72 MMHG | HEIGHT: 63 IN | SYSTOLIC BLOOD PRESSURE: 150 MMHG | BODY MASS INDEX: 33.12 KG/M2 | OXYGEN SATURATION: 97 %

## 2017-08-31 VITALS
WEIGHT: 186 LBS | HEIGHT: 63 IN | DIASTOLIC BLOOD PRESSURE: 80 MMHG | SYSTOLIC BLOOD PRESSURE: 158 MMHG | BODY MASS INDEX: 32.96 KG/M2

## 2017-08-31 DIAGNOSIS — I10 ESSENTIAL HYPERTENSION: ICD-10-CM

## 2017-08-31 DIAGNOSIS — N18.30 CKD (CHRONIC KIDNEY DISEASE) STAGE 3, GFR 30-59 ML/MIN: Primary | ICD-10-CM

## 2017-08-31 DIAGNOSIS — I10 ESSENTIAL HYPERTENSION: Primary | ICD-10-CM

## 2017-08-31 PROCEDURE — 99214 OFFICE O/P EST MOD 30 MIN: CPT | Mod: S$PBB,,, | Performed by: INTERNAL MEDICINE

## 2017-08-31 PROCEDURE — 99999 PR PBB SHADOW E&M-EST. PATIENT-LVL II: CPT | Mod: PBBFAC,,,

## 2017-08-31 PROCEDURE — 3077F SYST BP >= 140 MM HG: CPT | Mod: ,,, | Performed by: INTERNAL MEDICINE

## 2017-08-31 PROCEDURE — 99212 OFFICE O/P EST SF 10 MIN: CPT | Mod: PBBFAC,27,PO

## 2017-08-31 PROCEDURE — 99999 PR PBB SHADOW E&M-EST. PATIENT-LVL II: CPT | Mod: PBBFAC,,, | Performed by: INTERNAL MEDICINE

## 2017-08-31 PROCEDURE — 3078F DIAST BP <80 MM HG: CPT | Mod: ,,, | Performed by: INTERNAL MEDICINE

## 2017-08-31 PROCEDURE — 99212 OFFICE O/P EST SF 10 MIN: CPT | Mod: PBBFAC,PO | Performed by: INTERNAL MEDICINE

## 2017-08-31 RX ORDER — MORPHINE SULFATE 15 MG/1
15 TABLET, FILM COATED, EXTENDED RELEASE ORAL 2 TIMES DAILY
Refills: 0 | COMMUNITY
Start: 2017-08-20 | End: 2018-02-26

## 2017-08-31 NOTE — PROGRESS NOTES
"Subjective:       Patient ID: Amanda Smith is a 67 y.o. White female who presents for return patient evaluation for chronic renal failure.    She has an endoscopy and a procedure for her back upcoming next month.  She has no uremic or urinary symptoms and is in her usual state of health.  There have been no recent illnesses, hospitalizations or procedures.      Review of Systems   Constitutional: Positive for appetite change, fatigue and unexpected weight change (lost 30 pounds since December). Negative for chills and fever.   Eyes: Negative for visual disturbance.   Respiratory: Positive for shortness of breath. Negative for cough.    Cardiovascular: Positive for leg swelling. Negative for chest pain.   Gastrointestinal: Positive for abdominal pain. Negative for diarrhea, nausea and vomiting.   Genitourinary: Negative for difficulty urinating, dysuria and hematuria.   Musculoskeletal: Positive for arthralgias and back pain.   Skin: Negative for color change and rash.   Neurological: Positive for headaches.   Hematological: Bruises/bleeds easily.   Psychiatric/Behavioral: Positive for sleep disturbance.       The past medical, family and social histories were reviewed for this encounter.     BP (!) 150/72   Pulse 62   Ht 5' 3" (1.6 m)   Wt 84.8 kg (186 lb 15.2 oz)   SpO2 97%   BMI 33.12 kg/m²     Objective:      Physical Exam   Constitutional: She is oriented to person, place, and time. She appears well-developed and well-nourished. No distress.   HENT:   Head: Normocephalic and atraumatic.   Eyes: Conjunctivae are normal.   Neck: Neck supple. No JVD present.   Cardiovascular: Normal rate, regular rhythm and normal heart sounds.  Exam reveals no gallop and no friction rub.    No murmur heard.  Pulmonary/Chest: Effort normal and breath sounds normal. No respiratory distress. She has no wheezes. She has no rales.   Abdominal: Soft. Bowel sounds are normal. She exhibits no distension (obese). There is no " tenderness.   Musculoskeletal: She exhibits no edema.   Neurological: She is alert and oriented to person, place, and time.   Skin: Skin is warm and dry. No rash noted.   Psychiatric: She has a normal mood and affect.   Vitals reviewed.      Assessment:       1. CKD (chronic kidney disease) stage 3, GFR 30-59 ml/min    2. Essential hypertension        Plan:   Return to clinic in 4 months.  Labs for next week include rp, pth, upc, ua.  Baseline creatinine is 1.7 since 2012.  PTH is 91 with a calcium of 9.9.  Please avoid or minimize all NSAIDS (ibuprofen, motrin, aleve, indocin, naprosyn) to minimize the risk to your kidneys.  Aspirin in a dose of 325 mg or less a day is likely the safest with regards to kidney function.  If you are able to take aspirin and do not have any bleeding problems or ulcers, this may be your best therapy.  Alternatively, acetaminophen (Tylenol) is the safer than NSAIDs with regards to kidney function.  I would ask you take this as directed on the bottle.  It is best to stay under 2 grams a day (4-500 mg tablets a day maximum).

## 2017-08-31 NOTE — PATIENT INSTRUCTIONS
2 patient identified and no c/o pain, patient verbalized the IM injection of testosterone/depo shot being received and waited 15 min., no side effects noted.  IM administered today at nurse visit, verified name , date of birth and shot.

## 2017-08-31 NOTE — PROGRESS NOTES
Patient will come back in one week, she was told to bring blood pressure cuff and didn't bring it in ,so she will come back next week.

## 2017-09-21 ENCOUNTER — TELEPHONE (OUTPATIENT)
Dept: FAMILY MEDICINE | Facility: CLINIC | Age: 67
End: 2017-09-21

## 2017-11-05 DIAGNOSIS — G47.00 INSOMNIA, UNSPECIFIED TYPE: ICD-10-CM

## 2017-11-06 RX ORDER — ZOLPIDEM TARTRATE 5 MG/1
5 TABLET ORAL NIGHTLY PRN
Qty: 30 TABLET | Refills: 2 | Status: SHIPPED | OUTPATIENT
Start: 2017-11-06 | End: 2018-02-26 | Stop reason: SINTOL

## 2017-12-15 DIAGNOSIS — Z11.59 NEED FOR HEPATITIS C SCREENING TEST: ICD-10-CM

## 2017-12-28 ENCOUNTER — PATIENT OUTREACH (OUTPATIENT)
Dept: ADMINISTRATIVE | Facility: HOSPITAL | Age: 67
End: 2017-12-28

## 2017-12-28 NOTE — PROGRESS NOTES
Health Maintenance Due   Topic Date Due    Hepatitis C Screening  1950    TETANUS VACCINE  08/23/1970    Mammogram  08/23/1992    DEXA SCAN  08/23/1992    Colonoscopy  08/23/2002    Zoster Vaccine  08/23/2012    Lipid Panel  11/08/2015    Influenza Vaccine  08/01/2017    Pneumococcal (65+) (1 of 2 - PCV13) 08/23/2017

## 2018-01-08 ENCOUNTER — PATIENT OUTREACH (OUTPATIENT)
Dept: ADMINISTRATIVE | Facility: HOSPITAL | Age: 68
End: 2018-01-08

## 2018-01-08 NOTE — LETTER
January 8, 2018    Amanda Smith  [address] [facility] [address]             [facility]  [address]  [facility] [address]  Phone: [phone] Dear MsKari Morsebipin:    We have tried to reach you by [facility] email unsuccessfully.      [facility] is committed to your overall health.  To help you get the most out of each of your visits, we will review your information to make sure you are up to date on all of your recommended tests and/or procedures.       Dr. Gaitan       has found that you may be due for:     Tetanus immunization   Mammogram   Osteoporosis screening (DEXA SCAN)   Colonoscopy (Colorectal screening)   Shingles immunization   Influenza vaccine   Pneumonia immunization   One-time Hepatitis C Screening lab test(a viral condition that can harm the liver)  Cholesterol check (Lipid Panel)    If you have had any of the above done at another facility, please bring the records or information with you so that your record at [facility] will be complete.      If you are currently taking medication , please bring it with you to your appointment for review.     Sincerely,    Sylwia Payne  Clinical Care Coordinator  [facility] [address] [facility] [address]  [place] [address]  Phone: [phone]   Fax: [phone]

## 2018-01-08 NOTE — PROGRESS NOTES
Portal outreach un-read by patient.  Outreach mailed today    Health Maintenance Due   Topic Date Due    Hepatitis C Screening  1950    TETANUS VACCINE  08/23/1970    Mammogram  08/23/1992    DEXA SCAN  08/23/1992    Colonoscopy  08/23/2002    Zoster Vaccine  08/23/2012    Lipid Panel  11/08/2015    Influenza Vaccine  08/01/2017    Pneumococcal (65+) (1 of 2 - PCV13) 08/23/2017

## 2018-01-28 DIAGNOSIS — I10 ESSENTIAL HYPERTENSION: ICD-10-CM

## 2018-01-30 RX ORDER — CARVEDILOL 25 MG/1
25 TABLET ORAL 2 TIMES DAILY WITH MEALS
Qty: 60 TABLET | Refills: 0 | Status: SHIPPED | OUTPATIENT
Start: 2018-01-30 | End: 2018-02-26 | Stop reason: DRUGHIGH

## 2018-01-30 NOTE — TELEPHONE ENCOUNTER
Needs an appointment with a provider; preferably NP Alexandra - next available.   Uncontrolled HTN

## 2018-02-26 ENCOUNTER — OFFICE VISIT (OUTPATIENT)
Dept: FAMILY MEDICINE | Facility: CLINIC | Age: 68
End: 2018-02-26
Payer: MEDICARE

## 2018-02-26 ENCOUNTER — LAB VISIT (OUTPATIENT)
Dept: LAB | Facility: HOSPITAL | Age: 68
End: 2018-02-26
Attending: NURSE PRACTITIONER
Payer: MEDICARE

## 2018-02-26 VITALS
HEART RATE: 77 BPM | OXYGEN SATURATION: 98 % | WEIGHT: 178.81 LBS | HEIGHT: 63 IN | BODY MASS INDEX: 31.68 KG/M2 | DIASTOLIC BLOOD PRESSURE: 98 MMHG | RESPIRATION RATE: 16 BRPM | SYSTOLIC BLOOD PRESSURE: 212 MMHG

## 2018-02-26 DIAGNOSIS — R11.2 NON-INTRACTABLE VOMITING WITH NAUSEA, UNSPECIFIED VOMITING TYPE: ICD-10-CM

## 2018-02-26 DIAGNOSIS — J44.1 COPD EXACERBATION: Primary | ICD-10-CM

## 2018-02-26 DIAGNOSIS — I10 ESSENTIAL HYPERTENSION: ICD-10-CM

## 2018-02-26 DIAGNOSIS — G47.00 INSOMNIA, UNSPECIFIED TYPE: ICD-10-CM

## 2018-02-26 DIAGNOSIS — M79.7 FIBROMYALGIA: ICD-10-CM

## 2018-02-26 DIAGNOSIS — N18.30 CKD (CHRONIC KIDNEY DISEASE) STAGE 3, GFR 30-59 ML/MIN: ICD-10-CM

## 2018-02-26 PROBLEM — J44.9 CHRONIC OBSTRUCTIVE PULMONARY DISEASE: Status: ACTIVE | Noted: 2018-02-26

## 2018-02-26 LAB
ALBUMIN SERPL BCP-MCNC: 3.9 G/DL
ALP SERPL-CCNC: 125 U/L
ALT SERPL W/O P-5'-P-CCNC: 15 U/L
ANION GAP SERPL CALC-SCNC: 10 MMOL/L
AST SERPL-CCNC: 17 U/L
BILIRUB SERPL-MCNC: 0.2 MG/DL
BUN SERPL-MCNC: 38 MG/DL
CALCIUM SERPL-MCNC: 9.7 MG/DL
CHLORIDE SERPL-SCNC: 110 MMOL/L
CO2 SERPL-SCNC: 20 MMOL/L
CREAT SERPL-MCNC: 2.2 MG/DL
EST. GFR  (AFRICAN AMERICAN): 26.3 ML/MIN/1.73 M^2
EST. GFR  (NON AFRICAN AMERICAN): 22.8 ML/MIN/1.73 M^2
GLUCOSE SERPL-MCNC: 83 MG/DL
IGA SERPL-MCNC: 379 MG/DL
POTASSIUM SERPL-SCNC: 4.1 MMOL/L
PROT SERPL-MCNC: 8.6 G/DL
SODIUM SERPL-SCNC: 140 MMOL/L

## 2018-02-26 PROCEDURE — 99999 PR PBB SHADOW E&M-EST. PATIENT-LVL IV: CPT | Mod: PBBFAC,,, | Performed by: NURSE PRACTITIONER

## 2018-02-26 PROCEDURE — 82784 ASSAY IGA/IGD/IGG/IGM EACH: CPT

## 2018-02-26 PROCEDURE — 80053 COMPREHEN METABOLIC PANEL: CPT

## 2018-02-26 PROCEDURE — 99214 OFFICE O/P EST MOD 30 MIN: CPT | Mod: PBBFAC,PO | Performed by: NURSE PRACTITIONER

## 2018-02-26 PROCEDURE — 83516 IMMUNOASSAY NONANTIBODY: CPT

## 2018-02-26 PROCEDURE — 36415 COLL VENOUS BLD VENIPUNCTURE: CPT | Mod: PO

## 2018-02-26 PROCEDURE — 99214 OFFICE O/P EST MOD 30 MIN: CPT | Mod: S$PBB,,, | Performed by: NURSE PRACTITIONER

## 2018-02-26 RX ORDER — TRAZODONE HYDROCHLORIDE 50 MG/1
50 TABLET ORAL NIGHTLY
Qty: 30 TABLET | Refills: 5 | Status: SHIPPED | OUTPATIENT
Start: 2018-02-26 | End: 2018-03-12 | Stop reason: SDUPTHER

## 2018-02-26 RX ORDER — CARVEDILOL 12.5 MG/1
12.5 TABLET ORAL 2 TIMES DAILY
COMMUNITY
Start: 2017-12-19 | End: 2018-03-12

## 2018-02-26 RX ORDER — DOXYCYCLINE HYCLATE 100 MG
100 TABLET ORAL EVERY 12 HOURS
Qty: 14 TABLET | Refills: 0 | Status: SHIPPED | OUTPATIENT
Start: 2018-02-26 | End: 2018-03-05

## 2018-02-26 RX ORDER — PROMETHAZINE HYDROCHLORIDE 25 MG/1
25 SUPPOSITORY RECTAL EVERY 6 HOURS PRN
Qty: 40 SUPPOSITORY | Refills: 0 | Status: SHIPPED | OUTPATIENT
Start: 2018-02-26 | End: 2019-05-01

## 2018-02-26 NOTE — PROGRESS NOTES
Subjective:       Patient ID: Amanda Smith is a 67 y.o. female.    Chief Complaint: Hypertension (229/112 was the highest); Fever; Emesis (vomits immediately after taking meds and eating); Diarrhea (vomits immediately after taking meds and eating); Nasal Congestion (green drainage); Headache; Sore Throat; Chest Congestion; Cough (productive with green sputum); and Neck Pain    HPI   Ms. Smith is a new patient to me. She presents today for HTN and N/V/D--Chronic  - episodic.  Last 3-4 days, but takes additional 3-4 days to get her strength back because she is unable to eat or take her medication during these episodes--yesterday first day feeling better/tolerating PO.  EGD in June dx gastritis, neg H. Pylori.  She received anti-biotics in [place] that helped alleviate her symptoms.  No pain.  PPI does not help.  Allergic to Zofran.  Phenergan helps.  This current episode started 4 days ago.  She has some mild residual nausea and diarrhea. Saw Ruchi Allen who ordered CT, MRCP and gastric emptying study but patient never completed. Patient's sister is insistent she be tested for celiac disease.     CKD III: followed by Dr Valente, last seen August 2017, due for follow up   Chronic pain s/p multiple low back surgeries.  Dr Josr Tobar   COPD - no SOB now.       Vitals:    02/26/18 1305   BP: (!) 212/98   Pulse: 77   Resp: 16     Review of Systems   Constitutional: Negative for diaphoresis and fever.   HENT: Negative for facial swelling and trouble swallowing.    Eyes: Negative for discharge and redness.   Respiratory: Positive for cough and wheezing. Negative for shortness of breath.    Cardiovascular: Negative for chest pain and palpitations.   Gastrointestinal: Positive for diarrhea, nausea and vomiting.   Genitourinary: Negative for difficulty urinating and flank pain.   Musculoskeletal: Negative for back pain and neck pain.   Skin: Negative for rash and wound.   Neurological: Negative for facial  asymmetry and speech difficulty.   Psychiatric/Behavioral: Negative for confusion. The patient is not nervous/anxious.        Past Medical History:   Diagnosis Date    Anemia of chronic disease 11/30/2015    Anxiety     Blood transfusion     Cataract     Chronic kidney disease     Chronic obstructive pulmonary disease 2/26/2018    Depression     Fibromyalgia     Former smoker     GERD (gastroesophageal reflux disease)     Hypertension     Kidney stones     Renal failure     Temporal arteritis      Objective:      Physical Exam   Constitutional: She is oriented to person, place, and time. She does not have a sickly appearance. No distress.   HENT:   Head: Normocephalic.   Right Ear: Hearing normal.   Left Ear: Hearing normal.   Nose: Nose normal.   Eyes: Conjunctivae and lids are normal.   Neck: No JVD present. No tracheal deviation present.   Cardiovascular: Normal rate, regular rhythm, S1 normal, S2 normal and normal heart sounds.    Pulmonary/Chest: Effort normal. She has rhonchi. She exhibits no tenderness.   Abdominal: Normal appearance. She exhibits no distension.   Musculoskeletal: Normal range of motion. She exhibits no edema or deformity.   Neurological: She is alert and oriented to person, place, and time.   Skin: She is not diaphoretic. No pallor.   Psychiatric: She has a normal mood and affect. Her speech is normal and behavior is normal. Judgment and thought content normal. Cognition and memory are normal.   Nursing note and vitals reviewed.      Assessment:       1. COPD exacerbation    2. Non-intractable vomiting with nausea, unspecified vomiting type    3. Essential hypertension    4. CKD (chronic kidney disease) stage 3, GFR 30-59 ml/min    5. Fibromyalgia    6. Insomnia, unspecified type        Plan:       COPD exacerbation  -     doxycycline (VIBRA-TABS) 100 MG tablet; Take 1 tablet (100 mg total) by mouth every 12 (twelve) hours.  Dispense: 14 tablet; Refill: 0    Non-intractable  vomiting with nausea, unspecified vomiting type  -     Tissue transglutaminase, IgA; Future; Expected date: 02/26/2018  -     IgA; Future; Expected date: 02/26/2018  -     Comprehensive metabolic panel; Future; Expected date: 02/26/2018  -     promethazine (PHENERGAN) 25 MG suppository; Place 1 suppository (25 mg total) rectally every 6 (six) hours as needed for Nausea.  Dispense: 40 suppository; Refill: 0    Essential hypertension   Uncontrolled but has not been taking meds for a few days; resume regimen, 2 week follow up, buy home BP machine    CKD (chronic kidney disease) stage 3, GFR 30-59 ml/min    Fibromyalgia    Insomnia, unspecified type  -     traZODone (DESYREL) 50 MG tablet; Take 1 tablet (50 mg total) by mouth every evening.  Dispense: 30 tablet; Refill: 5       Follow-up in about 2 weeks (around 3/12/2018) for HTN/insomnia.  Or sooner as needed  Schedule follow ups with GI, nephrology

## 2018-02-26 NOTE — PATIENT INSTRUCTIONS
"  Gluten-Free Diet for Celiac Disease     Reading food labels will help you avoid foods with gluten.   You've been told that you have celiac disease. This means that you are sensitive to a protein called gluten. Gluten is found in certain grains. When you ingest gluten, your immune system causes harm to your small intestines. The treatment for celiac disease is to avoid foods and products that contain gluten. You will need to do this for the rest of your life. Avoid the temptation to "cheat," even a small amount of gluten can cause symptoms to return. And it can harm your body. This sheet gives you the basics about a gluten-free diet. If you need help, a registered dietitian (RD) can teach you what foods and other products have gluten and how to avoid them.  Always read labels!  Many foods may contain gluten, even if you think they don't. Get into the habit of reading ingredient labels before you eat.   Choosing foods  The most common source of gluten is wheat flour (this includes "white" flour). Wheat flour is used to make many baked goods, including breads, pastas, cereals, pastries, and pizza dough. But gluten is also found in many foods that you might not think would have it. You will need to read food labels to look for gluten in everything you eat. But your diet does not need to be boring. Many foods are naturally gluten-free. And many foods commonly made with wheat flour now come in gluten-free forms. But keep in mind that if something is labeled "wheat-free" it may not be also gluten-free.  Foods to Avoid Foods You Can Eat   Bread, cereals, pasta, pastries, couscous, or pizza dough made with wheat flour (this includes white flour, farina, farro, emmer, durum, suhail, and semolina) Bread, cereals, pasta, pastries, or pizza dough made with rice flour, almond flour, beans, potatoes, and other gluten-free substitutes   Foods containing rye, barley (including malt), spelt, kamut, triticale, marcial's yeast, and " "bulgur Foods containing corn, cassava, rice, amaranth, buckwheat, millet, quinoa, arrowroot, teff, soy, and tapioca   Processed meats Fresh meats and seafood (beef, chicken, turkey, lamb, pork, fish, shellfish), beans, and tofu   Some dairy products with additives Many plain dairy products   Many sauces, gravies, dressings, and condiments, including traditional soy sauce Vinegar, oils, and gluten-free substitutes   Some granola bars and energy bars Gluten-free granola bars and energy bars   Some beers and spirits Wine, and gluten-free beers and distilled spirits   Some soups Gluten-free soups   Fruits and vegetables that are fried or breaded Fresh fruits and vegetables   Many packaged foods Packaged foods labeled "gluten-free"   Oats (check with your healthcare provider) Gluten-free oats   Communion wafers Gluten-free communion wafers   Avoiding accidental exposure to gluten  Staying gluten-free means always being aware. Even if you are very careful, mistakes can happen. The food you eat can't come into contact with gluten. Your meals must be made with utensils that have not touched foods that contain gluten. Shared knives, cutting boards, toasters, and storage containers are risks for gluten exposure. Shared condiments may have crumbs that contain gluten. At restaurants, parties, and other places where you eat food prepared by others, ask how the food was made. Gluten can also be found in some non-food items. Some medications contain gluten. So do some vitamin supplements. Ask your pharmacist before taking a medication or supplement. Also, some shampoos, lotions, toothpastes, makeup, lipstick (lip gloss and lip balm), glues, soaps, and other products contain gluten. It can be possible to ingest some gluten when using these projects. This is called cross-contamination. For example, this can happen if you use a lotion that has gluten and then touch food you eat. Also note that [employer] and similar products have " gluten. Any adult or child with celiac disease should wash their hands after handling these.  Coping with gluten-free living  Living gluten-free can be hard. While there are many gluten-free foods now that you can buy, it is still a big change for many people. You may be upset that you can't eat your favorite foods, eat freely at restaurants, parties, or over the holidays. Household members may also be upset by the strict controls over food. If you face problems like these, think about joining a celiac disease support group. Support groups offer tips on how to make a gluten-free lifestyle easier on you and the people you live with. You can find ways to involve the people in your household. There are many ways to make gluten-free group meals. See More Resources below for help in finding a group.  Bring safe foods that you enjoy to parties and school or work events. This can help you avoid the urge to grab something you shouldnt eat.   Following up with your health care provider  You should see your health care provider at least once a year for a celiac checkup. A simple blood test can show if your celiac disease is under control. If you are having symptoms, your health care provider can help you find sources of gluten you may have missed.  More resources  To learn more about managing celiac disease, try these resources:  · Celiac Disease Foundation  www.celiac.org  · Academy of Nutrition and Dietetics  www.eatright.org  · National Digestive Diseases Information Clearinghouse  www.digestive.niddk.nih.gov  Date Last Reviewed: 6/19/2015  © 6102-8115 [employer]. [address] [place] [address], [place] [address]. All rights reserved. This information is not intended as a substitute for professional medical care. Always follow your healthcare professional's instructions.

## 2018-02-28 ENCOUNTER — PATIENT MESSAGE (OUTPATIENT)
Dept: FAMILY MEDICINE | Facility: CLINIC | Age: 68
End: 2018-02-28

## 2018-02-28 LAB — TTG IGA SER IA-ACNC: 7 UNITS

## 2018-03-01 ENCOUNTER — INITIAL CONSULT (OUTPATIENT)
Dept: GASTROENTEROLOGY | Facility: CLINIC | Age: 68
End: 2018-03-01
Payer: MEDICARE

## 2018-03-01 ENCOUNTER — LAB VISIT (OUTPATIENT)
Dept: LAB | Facility: HOSPITAL | Age: 68
End: 2018-03-01
Attending: NURSE PRACTITIONER
Payer: MEDICARE

## 2018-03-01 ENCOUNTER — OFFICE VISIT (OUTPATIENT)
Dept: NEPHROLOGY | Facility: CLINIC | Age: 68
End: 2018-03-01
Payer: MEDICARE

## 2018-03-01 VITALS
HEIGHT: 61 IN | WEIGHT: 181.19 LBS | BODY MASS INDEX: 34.21 KG/M2 | SYSTOLIC BLOOD PRESSURE: 190 MMHG | HEART RATE: 68 BPM | DIASTOLIC BLOOD PRESSURE: 96 MMHG

## 2018-03-01 VITALS
HEIGHT: 61 IN | WEIGHT: 181.19 LBS | BODY MASS INDEX: 34.21 KG/M2 | OXYGEN SATURATION: 98 % | DIASTOLIC BLOOD PRESSURE: 78 MMHG | SYSTOLIC BLOOD PRESSURE: 190 MMHG | HEART RATE: 66 BPM

## 2018-03-01 DIAGNOSIS — R03.0 ELEVATED BLOOD PRESSURE READING: ICD-10-CM

## 2018-03-01 DIAGNOSIS — K83.8 COMMON BILE DUCT DILATATION: ICD-10-CM

## 2018-03-01 DIAGNOSIS — Z51.81 ENCOUNTER FOR MONITORING LONG-TERM PROTON PUMP INHIBITOR THERAPY: ICD-10-CM

## 2018-03-01 DIAGNOSIS — R74.8 ELEVATED PANCREATIC ENZYME: ICD-10-CM

## 2018-03-01 DIAGNOSIS — R05.9 COUGH: ICD-10-CM

## 2018-03-01 DIAGNOSIS — R63.4 WEIGHT LOSS: ICD-10-CM

## 2018-03-01 DIAGNOSIS — N18.30 CKD (CHRONIC KIDNEY DISEASE) STAGE 3, GFR 30-59 ML/MIN: Primary | ICD-10-CM

## 2018-03-01 DIAGNOSIS — K52.9 CHRONIC DIARRHEA: ICD-10-CM

## 2018-03-01 DIAGNOSIS — Z90.49 S/P CHOLECYSTECTOMY: ICD-10-CM

## 2018-03-01 DIAGNOSIS — Z79.899 ENCOUNTER FOR MONITORING LONG-TERM PROTON PUMP INHIBITOR THERAPY: ICD-10-CM

## 2018-03-01 DIAGNOSIS — R19.8 IRREGULAR BOWEL HABITS: ICD-10-CM

## 2018-03-01 DIAGNOSIS — R11.2 NON-INTRACTABLE VOMITING WITH NAUSEA, UNSPECIFIED VOMITING TYPE: Primary | ICD-10-CM

## 2018-03-01 DIAGNOSIS — Z80.0 FAMILY HISTORY OF COLON CANCER: ICD-10-CM

## 2018-03-01 DIAGNOSIS — I10 ESSENTIAL HYPERTENSION: ICD-10-CM

## 2018-03-01 LAB
MAGNESIUM SERPL-MCNC: 1.9 MG/DL
VIT B12 SERPL-MCNC: 460 PG/ML

## 2018-03-01 PROCEDURE — 99999 PR PBB SHADOW E&M-EST. PATIENT-LVL III: CPT | Mod: PBBFAC,,, | Performed by: INTERNAL MEDICINE

## 2018-03-01 PROCEDURE — 83735 ASSAY OF MAGNESIUM: CPT

## 2018-03-01 PROCEDURE — 99213 OFFICE O/P EST LOW 20 MIN: CPT | Mod: PBBFAC,PO | Performed by: INTERNAL MEDICINE

## 2018-03-01 PROCEDURE — 99214 OFFICE O/P EST MOD 30 MIN: CPT | Mod: S$PBB,,, | Performed by: NURSE PRACTITIONER

## 2018-03-01 PROCEDURE — 82607 VITAMIN B-12: CPT

## 2018-03-01 PROCEDURE — 99999 PR PBB SHADOW E&M-EST. PATIENT-LVL IV: CPT | Mod: PBBFAC,,, | Performed by: NURSE PRACTITIONER

## 2018-03-01 PROCEDURE — 99214 OFFICE O/P EST MOD 30 MIN: CPT | Mod: PBBFAC,27,PO | Performed by: NURSE PRACTITIONER

## 2018-03-01 PROCEDURE — 99214 OFFICE O/P EST MOD 30 MIN: CPT | Mod: S$PBB,,, | Performed by: INTERNAL MEDICINE

## 2018-03-01 RX ORDER — OMEPRAZOLE 20 MG/1
CAPSULE, DELAYED RELEASE ORAL
COMMUNITY
Start: 2018-02-28 | End: 2018-03-01 | Stop reason: DRUGHIGH

## 2018-03-01 RX ORDER — CLONIDINE 0.2 MG/24H
1 PATCH, EXTENDED RELEASE TRANSDERMAL
Qty: 4 PATCH | Refills: 11 | Status: SHIPPED | OUTPATIENT
Start: 2018-03-01 | End: 2018-03-29 | Stop reason: DRUGHIGH

## 2018-03-01 RX ORDER — HYDROCHLOROTHIAZIDE 25 MG/1
25 TABLET ORAL DAILY
COMMUNITY
End: 2018-03-12

## 2018-03-01 RX ORDER — HYDROCODONE BITARTRATE AND ACETAMINOPHEN 7.5; 325 MG/1; MG/1
1 TABLET ORAL EVERY 6 HOURS PRN
COMMUNITY
End: 2019-02-22 | Stop reason: ALTCHOICE

## 2018-03-01 NOTE — PATIENT INSTRUCTIONS
Uncertain Causes of Diarrhea (Adult)    Diarrhea is when stools are loose and watery. This can be caused by:  · Viral infections  · Bacterial infections  · Food poisoning  · Parasites  · Irritable bowel syndrome (IBS)  · Inflammatory bowel diseases such as ulcerative colitis, Crohn's disease, and celiac disease  · Food intolerance, such as to lactose, the sugar found in milk and milk products  · Reaction to medicines like antibiotics, laxatives, cancer drugs, and antacids  Along with diarrhea, you may also have:  · Abdominal pain and cramping  · Nausea and vomiting  · Loss of bowel control  · Fever and chills  · Bloody stools  In some cases, antibiotics may help to treat diarrhea. You may have a stool sample test. This is done to see what is causing your diarrhea, and if antibiotics will help treat it. The results of a stool sample test may take up to 2 days. The healthcare provider may not give you antibiotics until he or she has the stool test results.  Diarrhea can cause dehydration. This is the loss of too much water and other fluids from the body. When this occurs, body fluid must be replaced. This can be done with oral rehydration solutions. Oral rehydration solutions are available at drugstores and grocery stores without a prescription.  Home care  Follow all instructions given by your healthcare provider. Rest at home for the next 24 hours, or until you feel better. Avoid caffeine, tobacco, and alcohol. These can make diarrhea, cramping, and pain worse.  If taking medicines:  · Dont take over-the-counter diarrhea or nausea medicines unless your healthcare provider tells you to.  · You may use acetaminophen or NSAID medicines like ibuprofen or naproxen to reduce pain and fever. Dont use these if you have chronic liver or kidney disease, or ever had a stomach ulcer or gastrointestinal bleeding. Don't use NSAID medicines if you are already taking one for another condition (like arthritis) or are on daily  aspirin therapy (such as for heart disease or after a stroke). Talk with your healthcare provider first.  · If antibiotics were prescribed, be sure you take them until they are finished. Dont stop taking them even when you feel better. Antibiotics must be taken as a full course.  To prevent the spread of illness:  · Remember that washing with soap and water and using alcohol-based  is the best way to prevent the spread of infection.  · Clean the toilet after each use.  · Wash your hands before eating.  · Wash your hands before and after preparing food. Keep in mind that people with diarrhea or vomiting should not prepare food for others.  · Wash your hands after using cutting boards, countertops, and knives that have been in contact with raw foods.  · Wash and then peel fruits and vegetables.  · Keep uncooked meats away from cooked and ready-to-eat foods.  · Use a food thermometer when cooking. Cook poultry to at least 165°F (74°C). Cook ground meat (beef, veal, pork, lamb) to at least 160°F (71°C). Cook fresh beef, veal, lamb, and pork to at least 145°F (63°C).  · Dont eat raw or undercooked eggs (poached or rajan side up), poultry, meat, or unpasteurized milk and juices.  Food and drinks  The main goal while treating vomiting or diarrhea is to prevent dehydration. This is done by taking small amounts of liquids often.  · Keep in mind that liquids are more important than food right now.  · Drink only small amounts of liquids at a time.  · Dont force yourself to eat, especially if you are having cramping, vomiting, or diarrhea. Dont eat large amounts at a time, even if you are hungry.  · If you eat, avoid fatty, greasy, spicy, or fried foods.  · Dont eat dairy foods or drink milk if you have diarrhea. These can make diarrhea worse.  During the first 24 hours you can try:  · Oral rehydration solutions. Do not use sports drinks. They have too much sugar and not enough electrolytes.  · Soft drinks without  caffeine  · Ginger ale  · Water (plain or flavored)  · Decaf tea or coffee  · Clear broth, consommé, or bouillon  · Gelatin, popsicles, or frozen fruit juice bars  The second 24 hours, if you are feeling better, you can add:  · Hot cereal, plain toast, bread, rolls, or crackers  · Plain noodles, rice, mashed potatoes, chicken noodle soup, or rice soup  · Unsweetened canned fruit (no pineapple)  · Bananas  As you recover:  · Limit fat intake to less than 15 grams per day. Dont eat margarine, butter, oils, mayonnaise, sauces, gravies, fried foods, peanut butter, meat, poultry, or fish.  · Limit fiber. Dont eat raw or cooked vegetables, fresh fruits except bananas, or bran cereals.  · Limit caffeine and chocolate.  · Limit dairy.  · Dont use spices or seasonings except salt.  · Go back to your normal diet over time, as you feel better and your symptoms improve.  · If the symptoms come back, go back to a simple diet or clear liquids.  Follow-up care  Follow up with your healthcare provider, or as advised. If a stool sample was taken or cultures were done, call the healthcare provider for the results as instructed.  Call 911  Call 911 if you have any of these symptoms:  · Trouble breathing  · Confusion  · Extreme drowsiness or trouble walking  · Loss of consciousness  · Rapid heart rate  · Chest pain  · Stiff neck  · Seizure  When to seek medical advice  Call your healthcare provider right away if any of these occur:  · Abdominal pain that gets worse  · Constant lower right abdominal pain  · Continued vomiting and inability to keep liquids down  · Diarrhea more than 5 times a day  · Blood in vomit or stool  · Dark urine or no urine for 8 hours, dry mouth and tongue, tiredness, weakness, or dizziness  · Drowsiness  · New rash  · You dont get better in 2 to 3 days  · Fever of 100.4°F (38°C) or higher that doesnt get lower with medicine  Date Last Reviewed: 1/3/2016  © 1433-5447 [employer]. [address] [place] [address]  "[place] [address]. All rights reserved. This information is not intended as a substitute for professional medical care. Always follow your healthcare professional's instructions.        Vomiting (Adult)  Vomiting is a common symptom that may be due to different causes. These include gastroenteritis ("stomach flu"), food poisoning and gastritis. There are other more serious causes of vomiting which may be hard to diagnose early in the illness. Therefore, it is important to watch for the warning signs listed below.  The main danger from repeated vomiting is dehydration. This is due to excess loss of water and minerals from the body. When this occurs, body fluids must be replaced.  Home care  · If symptoms are severe, rest at home for the next 24 hours.  · Because your symptoms may be from an infection, wash your hands frequently and well, and use alcohol-based  to avoid spreading the infection to others.  · Wash your hands for at least 20 seconds. Hum the happy birthday song twice for the correct length of time.  · Wash your hands after using the toilet, before and after preparing food, before eating food, after changing a diaper, cleaning a wound, caring for a sick person, and blowing your nose, coughing, or sneezing. You should also wash your hands after caring for someone who is sick, touching pet food, or treats, and touching an animal, or animal waste.  · You may use acetaminophen or NSAID medicines like ibuprofen or naproxen to control fever, unless another medicine was prescribed. If you have chronic liver or kidney disease or ever had a stomach ulcer or GI bleeding, talk with your doctor before using these medicines. Aspirin should never be used in anyone under 18 years of age who is ill with a fever. It may cause severe liver damage. Don't use NSAID medicines if you are already taking one for another condition (like arthritis) or are on aspirin (such as for heart disease, or after a " stroke)  · Avoid tobacco and alcohol use, which may worsen your symptoms.  · If medicines for vomiting were prescribed, take as directed.  · Once vomiting stops, then follow these guidelines:  During the first 12 to 24 hours follow the diet below:  · Fruit juices. Apple, grape juice, clear fruit drinks, and electrolyte replacement drinks.  · Beverages. Soft drinks without caffeine; mineral water (plain or flavored), decaffeinated tea and coffee.  · Soups. Clear broth, consommé and bouillon  · Desserts. Plain gelatin, popsicles and fruit juice bars. As you feel better, you may add 6-8 ounces of yogurt per day.  During the next 24 hours you may add the following to the above:  · Hot cereal, plain toast, bread, rolls, crackers  · Plain noodles, rice, mashed potatoes, chicken noodle or rice soup  · Unsweetened canned fruit (avoid pineapple), bananas  · Limit caffeine and chocolate. No spices or seasonings except salt.  During the next 24 hours:  Gradually resume a normal diet, as you feel better and your symptoms lessen.  Follow-up care  Follow up with your healthcare provider, or as advised.  When to seek medical advice  Call your healthcare provider right away if any of these occur:  · Constant right-sided lower abdominal pain or increasing general abdominal pain  · Continued vomiting (unable to keep liquids down) for 24 hours  · Frequent diarrhea (more than 5 times a day); blood (red or black color) or mucus in diarrhea  · Reduced urine output or extreme thirst  · Weakness, dizziness or fainting  · Unusually drowsy or confused  · Fever of 100.4°F (38°C) oral or higher, or as directed  · Yellow color of the eyes or skin  Date Last Reviewed: 11/16/2015  © 2734-0483 [employer]. [address] [place] [address], [place] [address]. All rights reserved. This information is not intended as a substitute for professional medical care. Always follow your healthcare professional's instructions.

## 2018-03-01 NOTE — PROGRESS NOTES
"Subjective:       Patient ID: Amanda Smith is a 67 y.o. White female who presents for return patient evaluation for chronic renal failure.    She continues to have nausea, vomiting and diarrhea that prevents her from keeping down her medications.  As a result, her blood pressure has been consistently elevated.  Her urine is darker.      Review of Systems   Constitutional: Positive for appetite change, fatigue and unexpected weight change (lost 5 pounds since December). Negative for chills and fever.   Eyes: Negative for visual disturbance.   Respiratory: Positive for shortness of breath. Negative for cough.    Cardiovascular: Negative for chest pain and leg swelling.   Gastrointestinal: Positive for diarrhea, nausea and vomiting. Negative for abdominal pain.   Genitourinary: Positive for decreased urine volume. Negative for difficulty urinating, dysuria and hematuria.   Musculoskeletal: Positive for arthralgias and back pain.   Skin: Negative for color change and rash.   Neurological: Negative for headaches.   Hematological: Bruises/bleeds easily.   Psychiatric/Behavioral: Negative for sleep disturbance.       The past medical, family and social histories were reviewed for this encounter.     BP (!) 190/78 (BP Location: Left arm, Patient Position: Sitting)   Pulse 66   Ht 5' 1" (1.549 m)   Wt 82.2 kg (181 lb 3.5 oz)   SpO2 98%   BMI 34.24 kg/m²     Objective:      Physical Exam   Constitutional: She is oriented to person, place, and time. She appears well-developed and well-nourished. No distress.   HENT:   Head: Normocephalic and atraumatic.   Eyes: Conjunctivae are normal.   Neck: Neck supple. No JVD present.   Cardiovascular: Normal rate, regular rhythm and normal heart sounds.  Exam reveals no gallop and no friction rub.    No murmur heard.  Pulmonary/Chest: Effort normal and breath sounds normal. No respiratory distress. She has no wheezes. She has no rales.   Abdominal: Soft. Bowel sounds are " normal. She exhibits no distension (obese). There is no tenderness.   Musculoskeletal: She exhibits no edema.   Neurological: She is alert and oriented to person, place, and time.   Skin: Skin is warm and dry. No rash noted.   Psychiatric: She has a normal mood and affect.   Vitals reviewed.      Assessment:       1. CKD (chronic kidney disease) stage 3, GFR 30-59 ml/min    2. Essential hypertension        Plan:   Return to clinic in 3 months.  Labs for next week include rp, ua.  Baseline creatinine is 1.7 since 2012.  PTH is 91 with a calcium of 9.9.  Renal US shows  9.6 cm kidneys bilaterally.  Clonidine TTS 2 patch weekly.  She is seeing GI today.  Please avoid or minimize all NSAIDS (ibuprofen, motrin, aleve, indocin, naprosyn) to minimize the risk to your kidneys.  Aspirin in a dose of 325 mg or less a day is likely the safest with regards to kidney function.  If you are able to take aspirin and do not have any bleeding problems or ulcers, this may be your best therapy.  Alternatively, acetaminophen (Tylenol) is the safer than NSAIDs with regards to kidney function.  I would ask you take this as directed on the bottle.  It is best to stay under 2 grams a day (4-500 mg tablets a day maximum).

## 2018-03-01 NOTE — PROGRESS NOTES
Subjective:       Patient ID: Amanda Smith is a 67 y.o. female Body mass index is 34.24 kg/m².    Chief Complaint: Diarrhea (n/v diarrhea since last December 2016)    This patient is new to me.  Referring Provider:  MASSIEL Weaver NP for nausea, vomiting and diarrhea  Established patient of Dr. Shen.    GI Problem   The primary symptoms include weight loss (lost 40 lbs since 12/2016- decreased appetite and decreased food intake due to nausea), nausea, vomiting and diarrhea. Primary symptoms do not include fever, fatigue, abdominal pain, melena, hematemesis, hematochezia or dysuria. The illness began more than 7 days ago (started 12/2016).   The vomiting began more than 2 days ago (started 12/2016). Frequency: 3-4 times a day when episodes of diarrhea occurs which is once every other week. The emesis contains stomach contents, undigested food and bilious material (relieved with phenergan- needs refill).   The diarrhea began more than 1 week ago (started 12/2016). Daily occurrences: not daily; has episodes of diarrhea that last 2-5 days of 10 loose stools a day; when diarrhea is not present then constipation; diarrhea episodes occur every other week. Risk factors for illness producing diarrhea include recent antibiotic use (recent antibiotic and hospitalization for blood clot in lung 2 days after EGD- reports clot was gone before discharge (has seen PCP for follow-up)).   The illness is also significant for constipation (chronic in 1979). The illness does not include chills, dysphagia or odynophagia. Significant associated medical issues include GERD (controlled with omeprazole 40 mg once daily). Associated medical issues do not include inflammatory bowel disease.   Diarrhea    Associated symptoms include vomiting and weight loss (lost 40 lbs since 12/2016- decreased appetite and decreased food intake due to nausea). Pertinent negatives include no abdominal pain, chills, coughing or fever. There is no  history of inflammatory bowel disease.     Review of Systems   Constitutional: Positive for appetite change (decreased, eating small snacks; some days not eating anything; trying to drink fluids) and weight loss (lost 40 lbs since 2016- decreased appetite and decreased food intake due to nausea). Negative for chills, fatigue, fever and unexpected weight change.   HENT: Negative for sore throat and trouble swallowing.    Respiratory: Negative for cough, choking and shortness of breath.    Cardiovascular: Negative for chest pain.   Gastrointestinal: Positive for constipation (chronic in ), diarrhea, nausea and vomiting. Negative for abdominal pain, anal bleeding, blood in stool, dysphagia, hematemesis, hematochezia, melena and rectal pain.   Genitourinary: Negative for difficulty urinating, dysuria, flank pain, frequency, pelvic pain and urgency.   Neurological: Negative for weakness.       Past Medical History:   Diagnosis Date    Anemia of chronic disease 2015    Anxiety     Blood transfusion     Cataract     Chronic kidney disease     Chronic obstructive pulmonary disease 2018    Depression     Fibromyalgia     Former smoker     GERD (gastroesophageal reflux disease)     Hypertension     Kidney stones     Renal failure     Temporal arteritis      Past Surgical History:   Procedure Laterality Date    BACK SURGERY      x5     Brest tumor removal      cervical fusion\       SECTION      CHOLECYSTECTOMY      COLONOSCOPY  ~    unsure of who did it; internal hemorrhoids, denies colon polyps in the past    HYSTERECTOMY      ovaries removed    MRSA      neck and arm - mosquito bite     UPPER GASTROINTESTINAL ENDOSCOPY  2017    Dr. Shen     Family History   Problem Relation Age of Onset    Diabetes Mother     Cancer Father      Brain    Heart disease Sister     Diabetes Brother     Heart disease Brother     Colon cancer Brother 55    Diabetes Maternal  Grandmother     Heart disease Maternal Grandfather     GI problems Grandchild      trying to determine if she has crohn's    Crohn's disease Neg Hx     Ulcerative colitis Neg Hx      Wt Readings from Last 10 Encounters:   03/01/18 82.2 kg (181 lb 3.5 oz)   03/01/18 82.2 kg (181 lb 3.5 oz)   02/26/18 81.1 kg (178 lb 12.7 oz)   08/31/17 84.4 kg (186 lb)   08/31/17 84.8 kg (186 lb 15.2 oz)   08/17/17 84.1 kg (185 lb 6.5 oz)   08/14/17 83.8 kg (184 lb 11.9 oz)   07/10/17 84.2 kg (185 lb 10 oz)   06/08/17 81.6 kg (180 lb)   05/23/17 86.9 kg (191 lb 9.3 oz)     Lab Results   Component Value Date    WBC 12.69 08/17/2017    HGB 11.9 (L) 08/17/2017    HCT 35.7 (L) 08/17/2017    MCV 85 08/17/2017     (H) 08/17/2017     CMP  Sodium   Date Value Ref Range Status   02/26/2018 140 136 - 145 mmol/L Final     Potassium   Date Value Ref Range Status   02/26/2018 4.1 3.5 - 5.1 mmol/L Final     Chloride   Date Value Ref Range Status   02/26/2018 110 95 - 110 mmol/L Final     CO2   Date Value Ref Range Status   02/26/2018 20 (L) 23 - 29 mmol/L Final     Glucose   Date Value Ref Range Status   02/26/2018 83 70 - 110 mg/dL Final     BUN, Bld   Date Value Ref Range Status   02/26/2018 38 (H) 8 - 23 mg/dL Final     Creatinine   Date Value Ref Range Status   02/26/2018 2.2 (H) 0.5 - 1.4 mg/dL Final     Calcium   Date Value Ref Range Status   02/26/2018 9.7 8.7 - 10.5 mg/dL Final     Total Protein   Date Value Ref Range Status   02/26/2018 8.6 (H) 6.0 - 8.4 g/dL Final     Albumin   Date Value Ref Range Status   02/26/2018 3.9 3.5 - 5.2 g/dL Final     Total Bilirubin   Date Value Ref Range Status   02/26/2018 0.2 0.1 - 1.0 mg/dL Final     Comment:     For infants and newborns, interpretation of results should be based  on gestational age, weight and in agreement with clinical  observations.  Premature Infant recommended reference ranges:  Up to 24 hours.............<8.0 mg/dL  Up to 48 hours............<12.0 mg/dL  3-5  "days..................<15.0 mg/dL  6-29 days.................<15.0 mg/dL       Alkaline Phosphatase   Date Value Ref Range Status   02/26/2018 125 55 - 135 U/L Final     AST ([place])   Date Value Ref Range Status   11/30/2015 21 14 - 36 U/L Final     AST   Date Value Ref Range Status   02/26/2018 17 10 - 40 U/L Final     ALT   Date Value Ref Range Status   02/26/2018 15 10 - 44 U/L Final     Anion Gap   Date Value Ref Range Status   02/26/2018 10 8 - 16 mmol/L Final     eGFR if    Date Value Ref Range Status   02/26/2018 26.3 (A) >60 mL/min/1.73 m^2 Final     eGFR if non    Date Value Ref Range Status   02/26/2018 22.8 (A) >60 mL/min/1.73 m^2 Final     Comment:     Calculation used to obtain the estimated glomerular filtration  rate (eGFR) is the CKD-EPI equation.        Lab Results   Component Value Date    AMYLASE 142 (H) 08/17/2017     Lab Results   Component Value Date    TSH 0.706 08/17/2017     Reviewed prior medical records including 4/24/17 stool studies (negative), & endoscopy history (see surgical history).     6/8/17 EGD was reviewed and procedure report states:   " Impression:          - Normal esophagus.                       - Gastritis. Biopsied.                       - Food (residue) in the stomach.                       - Normal examined duodenum. Biopsied.  Recommendation:      - Await pathology and Marjorie test results.                       - Continue present medications.                       - Discharge patient to home (ambulatory).   Biopsy results:   "1. STOMACH (BIOPSY):  Antral mucosa with reactive/regenerative changes  Oxyntic mucosa with no significant histopathologic changes  No active inflammation  No Helicobacter organisms (routine and immunostain)  2. DUODENUM (BIOPSY):  Duodenal mucosa with no significant histopathologic changes"  Objective:      Physical Exam   Constitutional: She is oriented to person, place, and time. She appears well-developed " and well-nourished. No distress.   HENT:   Mouth/Throat: Oropharynx is clear and moist and mucous membranes are normal. No oral lesions. No oropharyngeal exudate.   Eyes: Conjunctivae are normal. Pupils are equal, round, and reactive to light. No scleral icterus.   Neck: Neck supple. No tracheal deviation present.   Cardiovascular: Normal rate.    Pulmonary/Chest: Effort normal and breath sounds normal. No respiratory distress. She has no wheezes.   Abdominal: Soft. Normal appearance and bowel sounds are normal. She exhibits no distension, no abdominal bruit and no mass. There is no hepatosplenomegaly. There is tenderness (mild) in the epigastric area. There is no rigidity, no rebound, no guarding, no tenderness at McBurney's point and negative Reilly's sign. No hernia.   Lymphadenopathy:     She has no cervical adenopathy.   Neurological: She is alert and oriented to person, place, and time.   Skin: Skin is warm and dry. No rash noted. She is not diaphoretic. No erythema. No pallor.   Non-jaundiced   Psychiatric: She has a normal mood and affect. Her behavior is normal.   Nursing note and vitals reviewed.      Assessment:       1. Non-intractable vomiting with nausea, unspecified vomiting type    2. Chronic diarrhea    3. Elevated pancreatic enzyme    4. Common bile duct dilatation    5. S/P cholecystectomy        Plan:       Non-intractable vomiting with nausea, unspecified vomiting type  -     NM Gastric Emptying; Future; Expected date: 08/17/2017  - REFILL    promethazine (PHENERGAN) 25 MG tablet; Take 1 tablet (25 mg total) by mouth every 6 (six) hours as needed for Nausea.  Dispense: 30 tablet; Refill: 1  -     US Abdomen Complete; Future; Expected date: 08/17/2017  Recommended small frequent meals instead of 3 big meals a day, low fat meals, avoid high fiber (insoluble fiber), red meats, dairy, and non-digestible solids (peels, fruit pulp, etc). Avoid NSAIDs and narcotics. Recommend low residue  diet.    Diarrhea, unspecified type  -     CBC auto differential; Future; Expected date: 08/17/2017  -     IgA; Future; Expected date: 08/17/2017  -     Tissue transglutaminase, IgA; Future; Expected date: 08/17/2017  -     Adenovirus Antigen EIA, Stool; Future; Expected date: 08/17/2017  -     Fecal fat, qualitative; Future; Expected date: 08/17/2017  -     Giardia / Cryptosporidum, EIA; Future; Expected date: 08/17/2017  -     Occult blood x 1, stool; Future; Expected date: 08/17/2017  -     pH, stool; Future; Expected date: 08/17/2017  -     Rotavirus antigen, stool; Future; Expected date: 08/17/2017  -     Stool Exam-Ova,Cysts,Parasites; Future; Expected date: 08/17/2017  -     WBC, Stool; Future; Expected date: 08/17/2017  -     Stool culture; Future; Expected date: 08/17/2017  -     Clostridium difficile EIA; Future; Expected date: 08/17/2017  - schedule Colonoscopy, discussed procedure with the patient, verbalized understanding  - recommended OTC probiotics, such as Align or Culturelle, as directed  - avoid lactose    Epigastric abdominal tenderness without rebound tenderness  -     CBC auto differential; Future; Expected date: 08/17/2017  -     Amylase; Future; Expected date: 08/17/2017  -     Lipase; Future; Expected date: 08/17/2017  -     US Abdomen Complete; Future; Expected date: 08/17/2017    Family history of colon cancer  - schedule Colonoscopy, discussed procedure with the patient, verbalized understanding    Irregular bowel habits & Change in bowel habits  -     TSH; Future; Expected date: 08/17/2017  - schedule Colonoscopy, discussed procedure with the patient, verbalized understanding  - discussed with patient that a side effect of narcotic pain medications is constipation, advised patient to talk to provider who manages pain medication and to try to stop or decrease use of narcotics, patient verbalized understanding    Weight loss  -     TSH; Future; Expected date: 08/17/2017  - schedule  Colonoscopy, discussed procedure with the patient, verbalized understanding  - encouraged PO intake and daily calorie counts to ensure adequate nutrition is taken in, recommend at least 2,000 calories a day  - recommend nutritional drinks, such as Boost, Ensure or Glucerna, to supplement nutrition needs  - possible ct scan if symptoms persist and pending results of testing    No Follow-up on file.      If no improvement in symptoms or symptoms worsen, call/follow-up at clinic or go to ER.

## 2018-03-01 NOTE — PROGRESS NOTES
Subjective:       Patient ID: Amanda Smith is a 67 y.o. female Body mass index is 34.24 kg/m².    Chief Complaint: GI Problem (n/v diarrhea since last December 2016)    Established patient of Dr. Shen & myself.    Patient reports she was not able to complete some of the previously ordered testing due to family concerns. Patient reports she would like to proceed with further testing.      GI Problem   The primary symptoms include weight loss (lost 40 lbs since 12/2016- decreased appetite and decreased food intake due to nausea; stable since last visit), nausea, vomiting and diarrhea. Primary symptoms do not include fever, fatigue, abdominal pain, melena, hematemesis, hematochezia or dysuria. The illness began more than 7 days ago (started 12/2016). The problem has been gradually worsening.   The vomiting began more than 2 days ago (started 12/2016). Frequency: 3-4 times a day when episodes of diarrhea occurs which is once a week. The emesis contains stomach contents, undigested food and bilious material (relieved with phenergan- needs refill).   The diarrhea began more than 1 week ago (started 12/2016). Daily occurrences: not daily; has episodes of diarrhea that last 2-5 days of 10 loose stools a day; when diarrhea is not present then constipation; diarrhea episodes occur every other week. Risk factors for illness producing diarrhea include recent antibiotic use (recent antibiotic- currently taking doxycycline for URI and last hospitalization for blood clot in lung 2 days after EGD (6/2017)- reports clot was gone before discharge).   The illness is also significant for constipation (chronic since 1979; occurring after pepto use; can last 3-4 days; colace prn). The illness does not include chills, dysphagia or odynophagia. Significant associated medical issues include GERD (controlled with omeprazole 40 mg once daily and zantac 300 mg once daily as directed). Associated medical issues do not include  inflammatory bowel disease.     Review of Systems   Constitutional: Positive for appetite change (decreased, eating small snacks; some days not eating anything; trying to drink fluids; gatorade and fruit; not eating a true meal) and weight loss (lost 40 lbs since 2016- decreased appetite and decreased food intake due to nausea; stable since last visit). Negative for chills, fatigue, fever and unexpected weight change.        Norco use PRN- reports she takes it a few times a week   HENT: Negative for sore throat and trouble swallowing.    Eyes: Negative for visual disturbance.   Respiratory: Negative for cough, choking and shortness of breath.    Cardiovascular: Negative for chest pain.        Reports saw nephrologist earlier today and was taken off BP medications since frequently she does not keep them down; was put on a BP medication patch; patient reports pressure was higher this past weekend   Gastrointestinal: Positive for constipation (chronic since ; occurring after pepto use; can last 3-4 days; colace prn), diarrhea, nausea and vomiting. Negative for abdominal pain, anal bleeding, blood in stool, dysphagia, hematemesis, hematochezia, melena and rectal pain.   Genitourinary: Negative for difficulty urinating, dysuria, flank pain, frequency, pelvic pain and urgency.   Neurological: Negative for weakness and headaches.       Past Medical History:   Diagnosis Date    Anemia of chronic disease 2015    Anxiety     Blood transfusion     Cataract     Chronic kidney disease     Chronic obstructive pulmonary disease 2018    Depression     Fibromyalgia     Former smoker     GERD (gastroesophageal reflux disease)     Hypertension     Kidney stones     Renal failure     Temporal arteritis      Past Surgical History:   Procedure Laterality Date    BACK SURGERY      x5     Brest tumor removal      cervical fusion\       SECTION      CHOLECYSTECTOMY      COLONOSCOPY  ~     unsure of who did it; internal hemorrhoids, denies colon polyps in the past    HYSTERECTOMY      ovaries removed    MRSA      neck and arm - mosquito bite     UPPER GASTROINTESTINAL ENDOSCOPY  06/08/2017    Dr. Shen     Family History   Problem Relation Age of Onset    Diabetes Mother     Cancer Father      Brain    Heart disease Sister     Diabetes Brother     Heart disease Brother     Colon cancer Brother 55    Diabetes Maternal Grandmother     Heart disease Maternal Grandfather     GI problems Grandchild      trying to determine if she has crohn's    Crohn's disease Neg Hx     Ulcerative colitis Neg Hx     Esophageal cancer Neg Hx     Stomach cancer Neg Hx      Wt Readings from Last 10 Encounters:   03/01/18 82.2 kg (181 lb 3.5 oz)   03/01/18 82.2 kg (181 lb 3.5 oz)   02/26/18 81.1 kg (178 lb 12.7 oz)   08/31/17 84.4 kg (186 lb)   08/31/17 84.8 kg (186 lb 15.2 oz)   08/17/17 84.1 kg (185 lb 6.5 oz)   08/14/17 83.8 kg (184 lb 11.9 oz)   07/10/17 84.2 kg (185 lb 10 oz)   06/08/17 81.6 kg (180 lb)   05/23/17 86.9 kg (191 lb 9.3 oz)     Lab Results   Component Value Date    WBC 12.69 08/17/2017    HGB 11.9 (L) 08/17/2017    HCT 35.7 (L) 08/17/2017    MCV 85 08/17/2017     (H) 08/17/2017     CMP  Sodium   Date Value Ref Range Status   02/26/2018 140 136 - 145 mmol/L Final     Potassium   Date Value Ref Range Status   02/26/2018 4.1 3.5 - 5.1 mmol/L Final     Chloride   Date Value Ref Range Status   02/26/2018 110 95 - 110 mmol/L Final     CO2   Date Value Ref Range Status   02/26/2018 20 (L) 23 - 29 mmol/L Final     Glucose   Date Value Ref Range Status   02/26/2018 83 70 - 110 mg/dL Final     BUN, Bld   Date Value Ref Range Status   02/26/2018 38 (H) 8 - 23 mg/dL Final     Creatinine   Date Value Ref Range Status   02/26/2018 2.2 (H) 0.5 - 1.4 mg/dL Final     Calcium   Date Value Ref Range Status   02/26/2018 9.7 8.7 - 10.5 mg/dL Final     Total Protein   Date Value Ref Range Status  "  02/26/2018 8.6 (H) 6.0 - 8.4 g/dL Final     Albumin   Date Value Ref Range Status   02/26/2018 3.9 3.5 - 5.2 g/dL Final     Total Bilirubin   Date Value Ref Range Status   02/26/2018 0.2 0.1 - 1.0 mg/dL Final     Comment:     For infants and newborns, interpretation of results should be based  on gestational age, weight and in agreement with clinical  observations.  Premature Infant recommended reference ranges:  Up to 24 hours.............<8.0 mg/dL  Up to 48 hours............<12.0 mg/dL  3-5 days..................<15.0 mg/dL  6-29 days.................<15.0 mg/dL       Alkaline Phosphatase   Date Value Ref Range Status   02/26/2018 125 55 - 135 U/L Final     AST ([place])   Date Value Ref Range Status   11/30/2015 21 14 - 36 U/L Final     AST   Date Value Ref Range Status   02/26/2018 17 10 - 40 U/L Final     ALT   Date Value Ref Range Status   02/26/2018 15 10 - 44 U/L Final     Anion Gap   Date Value Ref Range Status   02/26/2018 10 8 - 16 mmol/L Final     eGFR if    Date Value Ref Range Status   02/26/2018 26.3 (A) >60 mL/min/1.73 m^2 Final     eGFR if non    Date Value Ref Range Status   02/26/2018 22.8 (A) >60 mL/min/1.73 m^2 Final     Comment:     Calculation used to obtain the estimated glomerular filtration  rate (eGFR) is the CKD-EPI equation.        Lab Results   Component Value Date    LIPASE 136 (H) 08/17/2017     Lab Results   Component Value Date    AMYLASE 142 (H) 08/17/2017     Lab Results   Component Value Date    TSH 0.706 08/17/2017     Reviewed prior medical records including 8/21/17 abdominal ultrasound; 4/24/17 stool studies (negative); 2/26/18 & 8/17/17 celiac serology (WNL); & endoscopy history (see surgical history).     6/8/17 EGD was reviewed and procedure report states:   " Impression:          - Normal esophagus.                       - Gastritis. Biopsied.                       - Food (residue) in the stomach.                       - Normal " "examined duodenum. Biopsied.  Recommendation:      - Await pathology and Marjorie test results.                       - Continue present medications.                       - Discharge patient to home (ambulatory).   Biopsy results:   "1. STOMACH (BIOPSY):  Antral mucosa with reactive/regenerative changes  Oxyntic mucosa with no significant histopathologic changes  No active inflammation  No Helicobacter organisms (routine and immunostain)  2. DUODENUM (BIOPSY):  Duodenal mucosa with no significant histopathologic changes"  Objective:      Physical Exam   Constitutional: She is oriented to person, place, and time. She appears well-developed and well-nourished. No distress.   HENT:   Mouth/Throat: Oropharynx is clear and moist and mucous membranes are normal. No oral lesions. No oropharyngeal exudate.   Eyes: Conjunctivae are normal. Pupils are equal, round, and reactive to light. No scleral icterus.   Neck: Neck supple. No tracheal deviation present.   Cardiovascular: Normal rate.    Pulmonary/Chest: Effort normal. No respiratory distress. She has no wheezes. She has rhonchi.   Abdominal: Soft. Normal appearance and bowel sounds are normal. She exhibits no distension, no abdominal bruit and no mass. There is no hepatosplenomegaly. There is tenderness (mild) in the right upper quadrant. There is no rigidity, no rebound, no guarding, no tenderness at McBurney's point and negative Reilly's sign. No hernia.   Lymphadenopathy:     She has no cervical adenopathy.   Neurological: She is alert and oriented to person, place, and time.   Skin: Skin is warm and dry. No rash noted. She is not diaphoretic. No erythema. No pallor.   Non-jaundiced   Psychiatric: She has a normal mood and affect. Her behavior is normal.   Nursing note and vitals reviewed.      Assessment:       1. Non-intractable vomiting with nausea, unspecified vomiting type    2. Chronic diarrhea    3. Elevated pancreatic enzyme    4. Common bile duct dilatation    5. " S/P cholecystectomy    6. Cough    7. Weight loss    8. Irregular bowel habits    9. Encounter for monitoring long-term proton pump inhibitor therapy    10. Family history of colon cancer    11. Elevated blood pressure reading        Plan:       Non-intractable vomiting with nausea, unspecified vomiting type  -  REFILL   ranitidine (ZANTAC) 300 MG tablet; Take 1 tablet (300 mg total) by mouth every evening.  Dispense: 90 tablet; Refill: 3  -     NM Gastric Emptying; Future; Expected date: 08/17/2017  - CONTINUE    promethazine (PHENERGAN) AS DIRECTED PRN NAUSEA  Recommended small frequent meals instead of 3 big meals a day, low fat meals, avoid high fiber (insoluble fiber), red meats, dairy, and non-digestible solids (peels, fruit pulp, etc). Avoid NSAIDs and narcotics. Recommend low residue diet.  - schedule EGD, discussed procedure with patient, patient verbalized understanding    Elevated pancreatic enzyme  - repeat lipase and amylase as previously ordered  - Stay well-hydrated, avoid alcohol, recommend small meals of high protein & low fat diet, & activity as tolerated. If no improvement in symptoms or symptoms worsen, call/follow-up at clinic or go to ER.    Common bile duct dilatation & S/P cholecystectomy  - complete MRCP and CT scan as previously ordered    Cough  -     CT Chest Without Contrast; Future; Expected date: 03/01/2018  Recommend follow-up with Primary Care Provider for continued evaluation and management.    Weight loss  -     CT Chest Without Contrast; Future; Expected date: 03/01/2018  - complete CT scan as previously ordered  - encouraged PO intake and daily calorie counts to ensure adequate nutrition is taken in, recommend at least 1,800-2,000 calories a day  - recommend nutritional drinks, such as Boost, Ensure or Glucerna, to supplement nutrition needs  - schedule EGD, discussed procedure with patient, patient verbalized understanding  - schedule Colonoscopy, discussed procedure with the  patient, patient verbalized understanding    Chronic diarrhea & Irregular bowel habits  - schedule Colonoscopy, discussed procedure with the patient, patient verbalized understanding  -     Adenovirus Antigen EIA, Stool; Future; Expected date: 08/17/2017  -     Fecal fat, qualitative; Future; Expected date: 08/17/2017  -     Giardia / Cryptosporidum, EIA; Future; Expected date: 08/17/2017  -     Occult blood x 1, stool; Future; Expected date: 08/17/2017  -     pH, stool; Future; Expected date: 08/17/2017  -     Rotavirus antigen, stool; Future; Expected date: 08/17/2017  -     Stool Exam-Ova,Cysts,Parasites; Future; Expected date: 08/17/2017  -     WBC, Stool; Future; Expected date: 08/17/2017  -     Stool culture; Future; Expected date: 08/17/2017  -     Clostridium difficile EIA; Future; Expected date: 08/17/2017  - recommended OTC probiotics, such as Align or Culturelle, as directed  - avoid lactose    Encounter for monitoring long-term proton pump inhibitor therapy  -  REFILL   ranitidine (ZANTAC) 300 MG tablet; Take 1 tablet (300 mg total) by mouth every evening.  Dispense: 90 tablet; Refill: 3  -     Vitamin B12; Future; Expected date: 03/01/2018  -     Magnesium; Future; Expected date: 03/01/2018  - discussed with patient about long term use of reflux medications (preference to use lowest effective dose or discontinuing if possible), the risk and benefits of using these medications long term, the risk of untreated GERD such as tellez's esophagus, and recommend a diet high in calcium and/or taking OTC calcium and vitamin d supplements as directed (such as Citracal +D), pt verbalized understanding.  - recommend annual monitoring with blood work to include CMP, CBC, vitamin B12, and magnesium.  - CONTINUE PRILOSEC 40 MG ONCE DAILY AS DIRECTED, take in the morning 30-60 minutes before breakfast, discussed about possible long term use of medication (prefer to use lowest effective dose or discontinuing if  possible) and discussed the risks & benefits with taking a reflux medication long term, and to take OTC calcium and vitamin d supplements as directed (such as Citracal +D), pt verbalized understanding  -discussed about the different types of medications used to treat reflux and how to use them, antacids can be used PRN for breakthrough heartburn symptoms by reducing stomach acid that is already produced, H2 blockers (zantac) work by limiting the amount acid production, & PPI's work to block acid production and are taken daily, patient verbalized understanding.  -Educated patient on lifestyle modifications to help control/reduce reflux/abdominal pain including: avoid large meals, avoid eating within 2-3 hours of bedtime (avoid late night eating & lying down soon after eating), elevate head of bed if nocturnal symptoms are present, smoking cessation (if current smoker), & weight loss (if overweight).   -Educated to avoid known foods which trigger reflux symptoms & to minimize/avoid high-fat foods, chocolate, caffeine, citrus, alcohol, & tomato products.  -Advised to avoid/limit use of NSAID's, since they can cause GI upset, bleeding, and/or ulcers. If needed, take with food.     Family history of colon cancer  - schedule Colonoscopy, discussed procedure with the patient, verbalized understanding    Elevated blood pressure reading  - instructed patient to monitor blood pressure at home and follow-up with PCP, nephrologist, &/or cardiologist  - patient denies headache, chest pain, shortness of breath, or blurred vision, educated patient that if blood pressure remains elevated or symptoms occur to go to ER.    Follow-up in about 1 month (around 4/1/2018), or if symptoms worsen or fail to improve.      If no improvement in symptoms or symptoms worsen, call/follow-up at clinic or go to ER.

## 2018-03-01 NOTE — LETTER
March 2, 2018      Ulices Gaitan MD  [address] [facility] [address] [place] [address]           [place]  [address] [facility] [address] [place] [address]  Phone: [phone]          Patient: Amanda Smith   MR Number: [id number]   YOB: 1950   Date of Visit: 3/1/2018       Dear Dr. Ulices Gaitan:    Thank you for referring Amanda Smith to me for evaluation. Attached you will find relevant portions of my assessment and plan of care.    If you have questions, please do not hesitate to call me. I look forward to following Amanda Smith along with you.    Sincerely,    Stacey Allen, [facility]    Enclosure  CC:  No Recipients    If you would like to receive this communication electronically, please contact [email or web address] or [phone] to request more information on "[employer]" Link access.    For providers and/or their staff who would like to refer a patient to [facility], please contact us through our one-stop-shop provider referral line, [facility] , at [phone].    If you feel you have received this communication in error or would no longer like to receive these types of communications, please e-mail [email or web address]

## 2018-03-05 ENCOUNTER — NURSE TRIAGE (OUTPATIENT)
Dept: ADMINISTRATIVE | Facility: CLINIC | Age: 68
End: 2018-03-05

## 2018-03-05 ENCOUNTER — TELEPHONE (OUTPATIENT)
Dept: NEPHROLOGY | Facility: CLINIC | Age: 68
End: 2018-03-05

## 2018-03-05 NOTE — TELEPHONE ENCOUNTER
Ok.  A huge part of her problem is that she is not tolerating any of her medications due to chronic nausea and diarrhea.  Thus she cannot hold down her BP meds.  She can use two TTS 2 patches for now until she has her endoscopy by Dr. Chakraborty and hopefully they can figure why she is having the chronic GI symptoms.

## 2018-03-05 NOTE — TELEPHONE ENCOUNTER
----- Message from Mayo Herrera sent at 3/5/2018 10:20 AM CST -----  Contact: same  FYI--Unsuccessful call placed to office.  Patient called in and stated that Dr. Valente put her on blood pressure patches, medium strength, but they are not working because her blood pressure is 217/108.  Patient wanted to see if they could be increased?  Patient stated it is lower then what is was and this is the first patch?  Patient did not know if she had to give it more time or what?  Patient was transferred to [facility] On Call nurse per protocol.    Patient call back number is [phone]

## 2018-03-05 NOTE — TELEPHONE ENCOUNTER
Transferred from triage nurse.  BP currently 217/104 with severe headache. This improved pressure, slightly,   Since she applied first patch on Thursday.  Patient is calling because Dr. Valente mentioned she may need a higher strength.  Patient is anticipating a stressful week upcoming in her personal life, with the unexpected death of a young family member.  She wants to get her BP under control so she can be well and not be a distraction or liability to the family during this time.

## 2018-03-05 NOTE — TELEPHONE ENCOUNTER
"    Reason for Disposition   Caller has URGENT medication question about med that PCP prescribed and triager unable to answer question    Answer Assessment - Initial Assessment Questions  1. SYMPTOMS: "Do you have any symptoms?"      Severe headache, and blood pressure elevated 217/104  2. SEVERITY: If symptoms are present, ask "Are they mild, moderate or severe?"      Severe    Pt states she was started on Clonidine patch, applied it last Thursday,  blood pressure that is better, but still elevated, and has headache that she believes is caused by the elevated pressure.  (bp sometimes runs 240's/120's, but currently 217/104.)    Protocols used: ST MEDICATION QUESTION CALL-A-AH      "

## 2018-03-12 ENCOUNTER — OFFICE VISIT (OUTPATIENT)
Dept: FAMILY MEDICINE | Facility: CLINIC | Age: 68
End: 2018-03-12
Payer: MEDICARE

## 2018-03-12 ENCOUNTER — HOSPITAL ENCOUNTER (OUTPATIENT)
Dept: RADIOLOGY | Facility: HOSPITAL | Age: 68
Discharge: HOME | End: 2018-03-12
Attending: NURSE PRACTITIONER
Payer: MEDICARE

## 2018-03-12 ENCOUNTER — TELEPHONE (OUTPATIENT)
Dept: FAMILY MEDICINE | Facility: CLINIC | Age: 68
End: 2018-03-12

## 2018-03-12 VITALS
WEIGHT: 183.19 LBS | HEART RATE: 88 BPM | DIASTOLIC BLOOD PRESSURE: 90 MMHG | SYSTOLIC BLOOD PRESSURE: 174 MMHG | OXYGEN SATURATION: 97 % | BODY MASS INDEX: 34.58 KG/M2 | HEIGHT: 61 IN | RESPIRATION RATE: 16 BRPM | TEMPERATURE: 99 F

## 2018-03-12 DIAGNOSIS — M19.90 OSTEOARTHRITIS, UNSPECIFIED OSTEOARTHRITIS TYPE, UNSPECIFIED SITE: Primary | ICD-10-CM

## 2018-03-12 DIAGNOSIS — G47.00 INSOMNIA, UNSPECIFIED TYPE: ICD-10-CM

## 2018-03-12 DIAGNOSIS — M25.562 ACUTE PAIN OF LEFT KNEE: Primary | ICD-10-CM

## 2018-03-12 DIAGNOSIS — I10 ESSENTIAL HYPERTENSION: ICD-10-CM

## 2018-03-12 DIAGNOSIS — M25.562 ACUTE PAIN OF LEFT KNEE: ICD-10-CM

## 2018-03-12 PROCEDURE — 99214 OFFICE O/P EST MOD 30 MIN: CPT | Mod: S$PBB,,, | Performed by: NURSE PRACTITIONER

## 2018-03-12 PROCEDURE — 73560 X-RAY EXAM OF KNEE 1 OR 2: CPT | Mod: 26,59,RT, | Performed by: RADIOLOGY

## 2018-03-12 PROCEDURE — 99214 OFFICE O/P EST MOD 30 MIN: CPT | Mod: PBBFAC,PO,25 | Performed by: NURSE PRACTITIONER

## 2018-03-12 PROCEDURE — 73562 X-RAY EXAM OF KNEE 3: CPT | Mod: 26,LT,, | Performed by: RADIOLOGY

## 2018-03-12 PROCEDURE — 73560 X-RAY EXAM OF KNEE 1 OR 2: CPT | Mod: TC,FY,PO,RT,59

## 2018-03-12 PROCEDURE — 99999 PR PBB SHADOW E&M-EST. PATIENT-LVL IV: CPT | Mod: PBBFAC,,, | Performed by: NURSE PRACTITIONER

## 2018-03-12 RX ORDER — TRAZODONE HYDROCHLORIDE 50 MG/1
100 TABLET ORAL NIGHTLY
Qty: 30 TABLET | Refills: 5
Start: 2018-03-12 | End: 2018-03-23 | Stop reason: SDUPTHER

## 2018-03-12 NOTE — PROGRESS NOTES
Subjective:       Patient ID: Amanda Smith is a 67 y.o. female.    Chief Complaint: Hypertension (headaches); Knee Pain (left ); and Back Pain    HPI   Ms. Smith is a known patient to me. She presents today for HTN follow up. Chronic N/V inhibiting her from taking oral antihypertensives, saw GI, scope scheduled but not until May? CT chest and abdomen scheduled for 3/21. She has not tried phenergan suppositories, remains nauseous.    Saw nephrology recently (Dr Valente), prescribed clonidine patch--advise can use 2 patches. Stopped all oral antihypertensives    Left knee pain: started ~3 weeks ago, gradually worsening, no injury/trauma. Has tried elevation and ice.     Insomnia: not improved with trazodone 50mg qhs. Very stressed--re health, family situations   Vitals:    03/12/18 1303   BP: (!) 174/90   Pulse: 88   Resp: 16   Temp: 98.5 °F (36.9 °C)     Review of Systems   Constitutional: Negative for diaphoresis and fever.   HENT: Negative for facial swelling and trouble swallowing.    Eyes: Negative for discharge and redness.   Respiratory: Negative for cough and shortness of breath.    Cardiovascular: Negative for chest pain and palpitations.   Gastrointestinal: Positive for diarrhea, nausea and vomiting.   Genitourinary: Negative for difficulty urinating and flank pain.   Musculoskeletal: Positive for arthralgias.   Skin: Negative for rash and wound.   Neurological: Negative for facial asymmetry and speech difficulty.   Psychiatric/Behavioral: Negative for confusion. The patient is not nervous/anxious.        Past Medical History:   Diagnosis Date    Anemia of chronic disease 11/30/2015    Anxiety     Blood transfusion     Cataract     Chronic kidney disease     Chronic obstructive pulmonary disease 2/26/2018    Depression     Fibromyalgia     Former smoker     GERD (gastroesophageal reflux disease)     Hypertension     Kidney stones     Renal failure     Temporal arteritis       Objective:      Physical Exam   Constitutional: She is oriented to person, place, and time. She does not have a sickly appearance. No distress.   HENT:   Head: Normocephalic.   Right Ear: Hearing normal.   Left Ear: Hearing normal.   Nose: Nose normal.   Eyes: Conjunctivae and lids are normal.   Neck: No JVD present. No tracheal deviation present.   Cardiovascular: Normal rate, regular rhythm, S1 normal, S2 normal and normal heart sounds.    Pulmonary/Chest: Effort normal and breath sounds normal. She exhibits no tenderness.   Abdominal: Normal appearance. She exhibits no distension.   Musculoskeletal: Normal range of motion. She exhibits no edema or deformity.        Left knee: She exhibits swelling. Tenderness found.   Neurological: She is alert and oriented to person, place, and time.   Skin: She is not diaphoretic. No pallor.   Psychiatric: She has a normal mood and affect. Her speech is normal and behavior is normal. Judgment and thought content normal. Cognition and memory are normal.   Nursing note and vitals reviewed.      Assessment:       1. Acute pain of left knee    2. Insomnia, unspecified type    3. Essential hypertension        Plan:       Acute pain of left knee  -     X-ray Knee Ortho Left; Future; Expected date: 03/12/2018  - [facility]    Insomnia, unspecified type  -     traZODone (DESYREL) 50 MG tablet; Take 2 tablets (100 mg total) by mouth every evening.  Dispense: 30 tablet; Refill: 5    Essential hypertension   Uncontrolled s/t inability to tolerate PO meds--take phenergan suppository; advised to call nephrology this week to see which meds Dr Valente would like to reintroduce      Follow-up if symptoms worsen or fail to improve.

## 2018-03-12 NOTE — TELEPHONE ENCOUNTER
----- Message from Alexandra Silvestre NP sent at 3/12/2018  1:54 PM CDT -----  Please send message to GI scheduling--see if scope can be moved up sooner than May

## 2018-03-18 DIAGNOSIS — I10 ESSENTIAL HYPERTENSION: ICD-10-CM

## 2018-03-19 RX ORDER — CARVEDILOL 12.5 MG/1
TABLET ORAL
Qty: 180 TABLET | Refills: 0 | Status: SHIPPED | OUTPATIENT
Start: 2018-03-19 | End: 2018-04-17 | Stop reason: DRUGHIGH

## 2018-03-19 NOTE — TELEPHONE ENCOUNTER
Carvedilol refilled.  Spoke with pt, attempted to schedule appt. She states that she is not driving, sister drives her.  She will be with sister tomorrow and can schedule at that time.

## 2018-03-21 ENCOUNTER — HOSPITAL ENCOUNTER (OUTPATIENT)
Dept: RADIOLOGY | Facility: HOSPITAL | Age: 68
Discharge: HOME | End: 2018-03-21
Attending: NURSE PRACTITIONER
Payer: MEDICARE

## 2018-03-21 ENCOUNTER — OFFICE VISIT (OUTPATIENT)
Dept: ORTHOPEDICS | Facility: CLINIC | Age: 68
End: 2018-03-21
Payer: MEDICARE

## 2018-03-21 VITALS
BODY MASS INDEX: 34.55 KG/M2 | DIASTOLIC BLOOD PRESSURE: 97 MMHG | SYSTOLIC BLOOD PRESSURE: 197 MMHG | WEIGHT: 183 LBS | HEIGHT: 61 IN | HEART RATE: 72 BPM

## 2018-03-21 DIAGNOSIS — Z90.49 S/P CHOLECYSTECTOMY: ICD-10-CM

## 2018-03-21 DIAGNOSIS — R11.2 NON-INTRACTABLE VOMITING WITH NAUSEA, UNSPECIFIED VOMITING TYPE: ICD-10-CM

## 2018-03-21 DIAGNOSIS — R63.4 WEIGHT LOSS: ICD-10-CM

## 2018-03-21 DIAGNOSIS — R10.816 EPIGASTRIC ABDOMINAL TENDERNESS WITHOUT REBOUND TENDERNESS: ICD-10-CM

## 2018-03-21 DIAGNOSIS — R74.8 ELEVATED PANCREATIC ENZYME: ICD-10-CM

## 2018-03-21 DIAGNOSIS — K83.8 COMMON BILE DUCT DILATATION: ICD-10-CM

## 2018-03-21 DIAGNOSIS — S83.242A ACUTE MEDIAL MENISCAL TEAR, LEFT, INITIAL ENCOUNTER: Primary | ICD-10-CM

## 2018-03-21 PROCEDURE — 74181 MRI ABDOMEN W/O CONTRAST: CPT | Mod: TC,PO

## 2018-03-21 PROCEDURE — 97760 ORTHOTIC MGMT&TRAING 1ST ENC: CPT | Mod: GP,S$PBB,, | Performed by: ORTHOPAEDIC SURGERY

## 2018-03-21 PROCEDURE — 20610 DRAIN/INJ JOINT/BURSA W/O US: CPT | Mod: S$PBB,RT,, | Performed by: ORTHOPAEDIC SURGERY

## 2018-03-21 PROCEDURE — 74181 MRI ABDOMEN W/O CONTRAST: CPT | Mod: 26,,, | Performed by: RADIOLOGY

## 2018-03-21 PROCEDURE — 99203 OFFICE O/P NEW LOW 30 MIN: CPT | Mod: 25,S$PBB,, | Performed by: ORTHOPAEDIC SURGERY

## 2018-03-21 PROCEDURE — 20610 DRAIN/INJ JOINT/BURSA W/O US: CPT | Mod: PBBFAC,PN | Performed by: ORTHOPAEDIC SURGERY

## 2018-03-21 PROCEDURE — 76376 3D RENDER W/INTRP POSTPROCES: CPT | Mod: 26,,, | Performed by: RADIOLOGY

## 2018-03-21 PROCEDURE — 99999 PR PBB SHADOW E&M-EST. PATIENT-LVL III: CPT | Mod: PBBFAC,,, | Performed by: ORTHOPAEDIC SURGERY

## 2018-03-21 PROCEDURE — 76376 3D RENDER W/INTRP POSTPROCES: CPT | Mod: TC,PO

## 2018-03-21 PROCEDURE — 99213 OFFICE O/P EST LOW 20 MIN: CPT | Mod: PBBFAC,25,PN | Performed by: ORTHOPAEDIC SURGERY

## 2018-03-21 PROCEDURE — 97760 ORTHOTIC MGMT&TRAING 1ST ENC: CPT | Mod: PBBFAC,PN | Performed by: ORTHOPAEDIC SURGERY

## 2018-03-21 RX ORDER — NABUMETONE 500 MG/1
500 TABLET, FILM COATED ORAL 2 TIMES DAILY
COMMUNITY
End: 2018-04-17

## 2018-03-21 RX ORDER — TRIAMCINOLONE ACETONIDE 40 MG/ML
40 INJECTION, SUSPENSION INTRA-ARTICULAR; INTRAMUSCULAR
Status: DISCONTINUED | OUTPATIENT
Start: 2018-03-21 | End: 2018-03-21 | Stop reason: HOSPADM

## 2018-03-21 RX ADMIN — TRIAMCINOLONE ACETONIDE 40 MG: 40 INJECTION, SUSPENSION INTRA-ARTICULAR; INTRAMUSCULAR at 01:03

## 2018-03-21 NOTE — LETTER
March 21, 2018      Alexandra Silvestre, AD  [address] [facility] [address] [place] [address]           [facility]  [address] [facility] [address] [place] [address]  Phone: [phone]          Patient: Amnada Smith   MR Number: [id number]   YOB: 1950   Date of Visit: 3/21/2018       Dear Alexandra Silvestre:    Thank you for referring Amanda Smith to me for evaluation. Attached you will find relevant portions of my assessment and plan of care.    If you have questions, please do not hesitate to call me. I look forward to following Amanda Smith along with you.    Sincerely,    Umang Marie MD    Enclosure  CC:  No Recipients    If you would like to receive this communication electronically, please contact [email or web address] or [phone] to request more information on [employer] Link access.    For providers and/or their staff who would like to refer a patient to [facility], please contact us through our one-stop-shop provider referral line, [facility] , at [phone].    If you feel you have received this communication in error or would no longer like to receive these types of communications, please e-mail [email or web address]

## 2018-03-21 NOTE — PROGRESS NOTES
Past Medical History:   Diagnosis Date    Anemia of chronic disease 2015    Anxiety     Blood transfusion     Cataract     Chronic kidney disease     Chronic obstructive pulmonary disease 2018    Depression     Fibromyalgia     Former smoker     GERD (gastroesophageal reflux disease)     Hypertension     Kidney stones     Renal failure     Temporal arteritis        Past Surgical History:   Procedure Laterality Date    BACK SURGERY      x5     Brest tumor removal      cervical fusion\       SECTION      CHOLECYSTECTOMY      COLONOSCOPY  ~    unsure of who did it; internal hemorrhoids, denies colon polyps in the past    HYSTERECTOMY      ovaries removed    MRSA      neck and arm - mosquito bite     UPPER GASTROINTESTINAL ENDOSCOPY  2017    Dr. Shen       Current Outpatient Prescriptions   Medication Sig    carvedilol (COREG) 12.5 MG tablet TAKE 1 TABLET BY MOUTH 2 TIMES DAILY WITH MEALS.    cloNIDine 0.2 mg/24 hr td ptwk (CATAPRES) 0.2 mg/24 hr Place 1 patch onto the skin every 7 days. (Patient taking differently: Place 2 patches onto the skin every 7 days. )    gabapentin (NEURONTIN) 300 MG capsule Take 600 mg by mouth 3 (three) times daily.     hydrocodone-acetaminophen 7.5-325mg (NORCO) 7.5-325 mg per tablet Take 1 tablet by mouth every 6 (six) hours as needed for Pain.    nabumetone (RELAFEN) 500 MG tablet Take 500 mg by mouth 2 (two) times daily.    omeprazole (PRILOSEC) 40 MG capsule Take 1 capsule (40 mg total) by mouth once daily.    promethazine (PHENERGAN) 25 MG suppository Place 1 suppository (25 mg total) rectally every 6 (six) hours as needed for Nausea.    ranitidine (ZANTAC) 300 MG tablet Take 1 tablet (300 mg total) by mouth every evening.    tizanidine (ZANAFLEX) 4 MG tablet Take 4 mg by mouth every 8 (eight) hours as needed.     traZODone (DESYREL) 50 MG tablet Take 2 tablets (100 mg total) by mouth every evening.     No current  facility-administered medications for this visit.        Review of patient's allergies indicates:   Allergen Reactions    Duloxetine Nausea And Vomiting    Ondansetron hcl (pf)      Other reaction(s): hypertension  Other reaction(s): chest pain    Sulfa (sulfonamide antibiotics)      Other reaction(s): acute renal failure       Family History   Problem Relation Age of Onset    Diabetes Mother     Cancer Father      Brain    Heart disease Sister     Diabetes Brother     Heart disease Brother     Colon cancer Brother 55    Diabetes Maternal Grandmother     Heart disease Maternal Grandfather     GI problems Grandchild      trying to determine if she has crohn's    Crohn's disease Neg Hx     Ulcerative colitis Neg Hx     Esophageal cancer Neg Hx     Stomach cancer Neg Hx        Social History     Social History    Marital status:      Spouse name: N/A    Number of children: N/A    Years of education: N/A     Occupational History    Not on file.     Social History Main Topics    Smoking status: Current Every Day Smoker     Packs/day: 0.25     Years: 45.00     Types: Cigarettes    Smokeless tobacco: Never Used      Comment: 1 pack a week     Alcohol use No      Comment: Quit June 2017    Drug use: No    Sexual activity: Not Currently     Other Topics Concern    Not on file     Social History Narrative    No narrative on file       Chief Complaint:   Chief Complaint   Patient presents with    Knee Pain     left knee pain       History of present illness: Is a 67-year-old female seen for left knee pain.  Pain is been present since about January.  No injury or trauma.  Pain over the medial compartment.  Pain as a 2 out of 10.  Pain with squatting and twisting.  Currently on Relafen.      Review of Systems:    Constitution: Negative for chills, fever, and sweats.  Negative for unexplained weight loss.    HENT:  Negative for headaches and blurry vision.    Cardiovascular:Negative for chest  pain or irregular heart beat. Negative for hypertension.    Respiratory:  Negative for cough and shortness of breath.    Gastrointestinal: Negative for abdominal pain, heartburn, melena, nausea, and vomitting.    Genitourinary:  Negative bladder incontinence and dysuria.    Musculoskeletal:  See HPI    Neurological: Negative for numbness.    Psychiatric/Behavioral: Negative for depression.  The patient is not nervous/anxious.      Endocrine: Negative for polyuria    Hematologic/Lymphatic: Negative for bleeding problem.  Does not bruise/bleed easily.    Skin: Negative for poor would healing and rash      Physical Examination:    Vital Signs:    Vitals:    03/21/18 1025   BP: (!) 197/97   Pulse: 72       Body mass index is 34.58 kg/m².    This a well-developed, well nourished patient in no acute distress.  They are alert and oriented and cooperative to examination.  Pt. walks without an antalgic gait.      Examination of the left knee shows no rashes or erythema. There are no masses ecchymosis or effusion. Patient has full range of motion from 0-130°. Patient is nontender to palpation over lateral joint line and moderately tender to palpation over the medial joint line. Patient has a - Lachman exam, - anterior drawer exam, and - posterior drawer exam.  Positive medial Apley exam. Knee is stable to varus and valgus stress. 5 out of 5 motor strength. Palpable distal pulses. Intact light touch sensation. Negative Patellofemoral crepitus    Examination of the right knee shows no rashes or erythema. There are no masses ecchymosis or effusion. Patient has full range of motion from 0-130°. Patient is nontender to palpation over lateral joint line and nontender to palpation over the medial joint line. Patient has a - Lachman exam, - anterior drawer exam, and - posterior drawer exam. - Nate's exam. Knee is stable to varus and valgus stress. 5 out of 5 motor strength. Palpable distal pulses. Intact light touch sensation.  Negative Patellofemoral crepitus    X-rays: X-rays of her left knee are available for review which show some mild medial joint space narrowing     Assessment:: Left knee arthritis with possible medial meniscal tear    Plan:  I reviewed the findings with her today.  Offered her cortisone injection which she agreed to.  We talked about possibly getting an MRI in the future.  I got her a BioSkin brace to help with swelling and stability.  We talked about decreasing or even stopping the Relafen due to her renal insufficiency.    We performed a custom orthotic/brace fitting, adjusting and training with the patient. The patient demonstrated understanding and proper care. This was performed for 15 minutes.          This note was created using Dragon voice recognition software that occasionally misinterpreted phrases or words.    Consult note is delivered via Epic messaging service.

## 2018-03-21 NOTE — PROCEDURES
Large Joint Aspiration/Injection  Date/Time: 3/21/2018 1:01 PM  Performed by: ALLEGRA IRELAND  Authorized by: ALLEGRA IRELAND     Consent Done?:  Yes (Verbal)  Indications:  Pain  Procedure site marked: Yes    Timeout: Prior to procedure the correct patient, procedure, and site was verified      Location:  Knee  Site:  L knee  Prep: Patient was prepped and draped in usual sterile fashion    Needle size:  20 G  Approach:  Anterolateral  Medications:  40 mg triamcinolone acetonide 40 mg/mL  Patient tolerance:  Patient tolerated the procedure well with no immediate complications

## 2018-03-23 ENCOUNTER — TELEPHONE (OUTPATIENT)
Dept: GASTROENTEROLOGY | Facility: CLINIC | Age: 68
End: 2018-03-23

## 2018-03-23 DIAGNOSIS — R74.8 ELEVATED PANCREATIC ENZYME: Primary | ICD-10-CM

## 2018-03-23 DIAGNOSIS — Z90.49 S/P CHOLECYSTECTOMY: ICD-10-CM

## 2018-03-23 DIAGNOSIS — G47.00 INSOMNIA, UNSPECIFIED TYPE: ICD-10-CM

## 2018-03-23 DIAGNOSIS — N18.30 CKD (CHRONIC KIDNEY DISEASE), STAGE III: Primary | ICD-10-CM

## 2018-03-23 DIAGNOSIS — K83.8 COMMON BILE DUCT DILATATION: ICD-10-CM

## 2018-03-23 RX ORDER — TRAZODONE HYDROCHLORIDE 100 MG/1
100 TABLET ORAL NIGHTLY
Qty: 30 TABLET | Refills: 5
Start: 2018-03-23 | End: 2018-04-17 | Stop reason: SDUPTHER

## 2018-03-23 NOTE — TELEPHONE ENCOUNTER
Please call to inform & review the results with the patient- radiology report of the MRI/MRCP showed mild dilation of the extrahepatic common bile duct most likely related s/p cholecystectomy (LFTs WNL on 2/26/18). Need to complete previously ordered blood work (lipase, amylase and hepatic function panel) & ct scan of abdomen pelvis to rule out other etiologies.  Other findings showed possible liver cyst or hemangioma (both of these are typically benign findings- complete ct scan). Renal cysts seen again as seen previously on prior ultrasound.  Otherwise unremarkable findings.  Continue with previous recommendations. If no improvement in symptoms or symptoms worsen, call/follow-up at clinic or go to ER.  Please release results to patient's mychart once you have discussed results and recommendations with patient.  Thanks,  Stacey GODDARD

## 2018-03-23 NOTE — TELEPHONE ENCOUNTER
NAYELI Betts from lab states stool specimen [facility], was not able to perform c-diff.

## 2018-03-23 NOTE — TELEPHONE ENCOUNTER
----- Message from Shelbi Hamilton sent at 3/23/2018  2:14 PM CDT -----  Amanda, patient [phone], calling to let the office know the increase in this Rx traZODone (DESYREL) 50 MG tablet to 100 mg, is working and she will need a new Rx. Please advise. Thanks.  [facility]/pharmacy #[id number] [address]  Phone: [phone] Fax: [phone]

## 2018-03-26 ENCOUNTER — TELEPHONE (OUTPATIENT)
Dept: GASTROENTEROLOGY | Facility: CLINIC | Age: 68
End: 2018-03-26

## 2018-03-26 NOTE — TELEPHONE ENCOUNTER
----- Message from Shelbi Hamilton sent at 3/23/2018  2:12 PM CDT -----  Contact: patient  Amanda, patient [phone], Calling about her test results from 03/21/18 for MRI MRCP. Please advise.theresa

## 2018-03-26 NOTE — TELEPHONE ENCOUNTER
"Please call to inform & review the results with the patient- stool studies showed acidic stool; otherwise, negative/normal results. Acidic stool can indicate difficulty digesting certain dietary items. Recommend to avoid lactose products.  "  Reason: Other   Comment: C difficile Toxin, Antigen was cancelled on 03/21/2018 at 23:16 by [email or web address]; Formed stool 03/21/2018 23:16     " If diarrhea persist, recommend completing C. diff stool study.  Continue with previous recommendations. If no improvement in symptoms or symptoms worsen, call/follow-up at clinic or go to ER.  Please release results to patient's mychart once you have discussed results and recommendations with patient.  Thanks,  Stacey GODDARD    "

## 2018-03-27 ENCOUNTER — TELEPHONE (OUTPATIENT)
Dept: GASTROENTEROLOGY | Facility: CLINIC | Age: 68
End: 2018-03-27

## 2018-03-27 ENCOUNTER — PATIENT MESSAGE (OUTPATIENT)
Dept: GASTROENTEROLOGY | Facility: CLINIC | Age: 68
End: 2018-03-27

## 2018-03-27 NOTE — TELEPHONE ENCOUNTER
----- Message from Manda Ortega sent at 3/27/2018  8:25 AM CDT -----  Contact: patient   Patient called to speak to the Nurse. She has a procedure tomorrow and has questions. Please advise. Call to pod. No answer.   Call back   Thanks!

## 2018-03-28 ENCOUNTER — ANESTHESIA EVENT (OUTPATIENT)
Dept: ENDOSCOPY | Facility: HOSPITAL | Age: 68
End: 2018-03-28
Payer: MEDICARE

## 2018-03-28 ENCOUNTER — ANESTHESIA (OUTPATIENT)
Dept: ENDOSCOPY | Facility: HOSPITAL | Age: 68
End: 2018-03-28
Payer: MEDICARE

## 2018-03-28 ENCOUNTER — SURGERY (OUTPATIENT)
Age: 68
End: 2018-03-28

## 2018-03-28 ENCOUNTER — HOSPITAL ENCOUNTER (OUTPATIENT)
Facility: HOSPITAL | Age: 68
Discharge: HOME | End: 2018-03-28
Attending: INTERNAL MEDICINE | Admitting: INTERNAL MEDICINE
Payer: MEDICARE

## 2018-03-28 VITALS
SYSTOLIC BLOOD PRESSURE: 189 MMHG | HEIGHT: 63 IN | HEART RATE: 89 BPM | WEIGHT: 180 LBS | BODY MASS INDEX: 31.89 KG/M2 | RESPIRATION RATE: 16 BRPM | DIASTOLIC BLOOD PRESSURE: 81 MMHG | TEMPERATURE: 98 F | OXYGEN SATURATION: 97 %

## 2018-03-28 DIAGNOSIS — R19.7 DIARRHEA: ICD-10-CM

## 2018-03-28 LAB — H PYLORI INDEX VALUE: NEGATIVE

## 2018-03-28 PROCEDURE — 45380 COLONOSCOPY AND BIOPSY: CPT | Mod: PO | Performed by: INTERNAL MEDICINE

## 2018-03-28 PROCEDURE — 27201012 HC FORCEPS, HOT/COLD, DISP: Mod: PO | Performed by: INTERNAL MEDICINE

## 2018-03-28 PROCEDURE — 43239 EGD BIOPSY SINGLE/MULTIPLE: CPT | Mod: 51,,, | Performed by: INTERNAL MEDICINE

## 2018-03-28 PROCEDURE — 37000008 HC ANESTHESIA 1ST 15 MINUTES: Mod: PO | Performed by: INTERNAL MEDICINE

## 2018-03-28 PROCEDURE — 37000009 HC ANESTHESIA EA ADD 15 MINS: Mod: PO | Performed by: INTERNAL MEDICINE

## 2018-03-28 PROCEDURE — 43239 EGD BIOPSY SINGLE/MULTIPLE: CPT | Mod: PO | Performed by: INTERNAL MEDICINE

## 2018-03-28 PROCEDURE — 87449 NOS EACH ORGANISM AG IA: CPT | Mod: PO | Performed by: INTERNAL MEDICINE

## 2018-03-28 PROCEDURE — D9220A PRA ANESTHESIA: Mod: ANES,,, | Performed by: ANESTHESIOLOGY

## 2018-03-28 PROCEDURE — 63600175 PHARM REV CODE 636 W HCPCS: Mod: PO | Performed by: NURSE ANESTHETIST, CERTIFIED REGISTERED

## 2018-03-28 PROCEDURE — 88305 TISSUE EXAM BY PATHOLOGIST: CPT | Mod: 26,,,

## 2018-03-28 PROCEDURE — 00813 ANES UPR LWR GI NDSC PX: CPT | Mod: PO | Performed by: INTERNAL MEDICINE

## 2018-03-28 PROCEDURE — 45380 COLONOSCOPY AND BIOPSY: CPT | Mod: ,,, | Performed by: INTERNAL MEDICINE

## 2018-03-28 PROCEDURE — 25000003 PHARM REV CODE 250: Mod: PO | Performed by: INTERNAL MEDICINE

## 2018-03-28 PROCEDURE — 88305 TISSUE EXAM BY PATHOLOGIST: CPT

## 2018-03-28 PROCEDURE — D9220A PRA ANESTHESIA: Mod: CRNA,,, | Performed by: NURSE ANESTHETIST, CERTIFIED REGISTERED

## 2018-03-28 RX ORDER — ONDANSETRON 2 MG/ML
INJECTION INTRAMUSCULAR; INTRAVENOUS
Status: DISCONTINUED | OUTPATIENT
Start: 2018-03-28 | End: 2018-03-28

## 2018-03-28 RX ORDER — SUCRALFATE 1 G/1
1 TABLET ORAL 3 TIMES DAILY
Qty: 100 TABLET | Refills: 2 | Status: SHIPPED | OUTPATIENT
Start: 2018-03-28 | End: 2018-07-09 | Stop reason: SDUPTHER

## 2018-03-28 RX ORDER — SODIUM CHLORIDE, SODIUM LACTATE, POTASSIUM CHLORIDE, CALCIUM CHLORIDE 600; 310; 30; 20 MG/100ML; MG/100ML; MG/100ML; MG/100ML
INJECTION, SOLUTION INTRAVENOUS CONTINUOUS
Status: DISCONTINUED | OUTPATIENT
Start: 2018-03-29 | End: 2018-03-28 | Stop reason: HOSPADM

## 2018-03-28 RX ORDER — PROPOFOL 10 MG/ML
VIAL (ML) INTRAVENOUS
Status: DISCONTINUED | OUTPATIENT
Start: 2018-03-28 | End: 2018-03-28

## 2018-03-28 RX ORDER — LIDOCAINE HCL/PF 100 MG/5ML
SYRINGE (ML) INTRAVENOUS
Status: DISCONTINUED | OUTPATIENT
Start: 2018-03-28 | End: 2018-03-28

## 2018-03-28 RX ORDER — HYDRALAZINE HYDROCHLORIDE 20 MG/ML
INJECTION INTRAMUSCULAR; INTRAVENOUS
Status: DISCONTINUED | OUTPATIENT
Start: 2018-03-28 | End: 2018-03-28

## 2018-03-28 RX ORDER — LIDOCAINE HYDROCHLORIDE 10 MG/ML
1 INJECTION, SOLUTION EPIDURAL; INFILTRATION; INTRACAUDAL; PERINEURAL ONCE
Status: COMPLETED | OUTPATIENT
Start: 2018-03-28 | End: 2018-03-28

## 2018-03-28 RX ADMIN — PROPOFOL 30 MG: 10 INJECTION, EMULSION INTRAVENOUS at 11:03

## 2018-03-28 RX ADMIN — SODIUM CHLORIDE, SODIUM LACTATE, POTASSIUM CHLORIDE, CALCIUM CHLORIDE: 600; 310; 30; 20 INJECTION, SOLUTION INTRAVENOUS at 10:03

## 2018-03-28 RX ADMIN — ONDANSETRON 4 MG: 2 INJECTION, SOLUTION INTRAMUSCULAR; INTRAVENOUS at 10:03

## 2018-03-28 RX ADMIN — PROPOFOL 30 MG: 10 INJECTION, EMULSION INTRAVENOUS at 10:03

## 2018-03-28 RX ADMIN — HYDRALAZINE HYDROCHLORIDE 10 MG: 20 INJECTION INTRAMUSCULAR; INTRAVENOUS at 10:03

## 2018-03-28 RX ADMIN — LIDOCAINE HYDROCHLORIDE 10 MG: 10 INJECTION, SOLUTION EPIDURAL; INFILTRATION; INTRACAUDAL; PERINEURAL at 10:03

## 2018-03-28 RX ADMIN — LIDOCAINE HYDROCHLORIDE 100 MG: 20 INJECTION, SOLUTION INTRAVENOUS at 10:03

## 2018-03-28 RX ADMIN — PROPOFOL 50 MG: 10 INJECTION, EMULSION INTRAVENOUS at 10:03

## 2018-03-28 RX ADMIN — PROPOFOL 100 MG: 10 INJECTION, EMULSION INTRAVENOUS at 10:03

## 2018-03-28 RX ADMIN — LIDOCAINE HYDROCHLORIDE 50 MG: 20 INJECTION, SOLUTION INTRAVENOUS at 10:03

## 2018-03-28 NOTE — DISCHARGE SUMMARY
Discharge Note  Short Stay      SUMMARY     Admit Date: 3/28/2018    Attending Physician: Kenyon Chakraborty Jr., MD     Discharge Physician: Kenyon Chakraborty Jr., MD    Discharge Date: 3/28/2018 11:22 AM    Final Diagnosis: Chronic diarrhea [K52.9]  Irregular bowel habits [R19.8]  Weight loss [R63.4]  Cough [R05]    Disposition: HOME OR SELF CARE    Patient Instructions:   Current Discharge Medication List      START taking these medications    Details   sucralfate (CARAFATE) 1 gram tablet Take 1 tablet (1 g total) by mouth 3 (three) times daily.  Qty: 100 tablet, Refills: 2         CONTINUE these medications which have NOT CHANGED    Details   carvedilol (COREG) 12.5 MG tablet TAKE 1 TABLET BY MOUTH 2 TIMES DAILY WITH MEALS.  Qty: 180 tablet, Refills: 0    Associated Diagnoses: Essential hypertension      cloNIDine 0.2 mg/24 hr td ptwk (CATAPRES) 0.2 mg/24 hr Place 1 patch onto the skin every 7 days.  Qty: 4 patch, Refills: 11      gabapentin (NEURONTIN) 300 MG capsule Take 600 mg by mouth 3 (three) times daily.   Refills: 2      hydrocodone-acetaminophen 7.5-325mg (NORCO) 7.5-325 mg per tablet Take 1 tablet by mouth every 6 (six) hours as needed for Pain.      nabumetone (RELAFEN) 500 MG tablet Take 500 mg by mouth 2 (two) times daily.      omeprazole (PRILOSEC) 40 MG capsule Take 1 capsule (40 mg total) by mouth once daily.  Qty: 90 capsule, Refills: 3    Associated Diagnoses: Gastroesophageal reflux disease, esophagitis presence not specified      promethazine (PHENERGAN) 25 MG suppository Place 1 suppository (25 mg total) rectally every 6 (six) hours as needed for Nausea.  Qty: 40 suppository, Refills: 0    Associated Diagnoses: Non-intractable vomiting with nausea, unspecified vomiting type      ranitidine (ZANTAC) 300 MG tablet Take 1 tablet (300 mg total) by mouth every evening.  Qty: 90 tablet, Refills: 3    Associated Diagnoses: Non-intractable vomiting with nausea, unspecified vomiting type; Encounter for  monitoring long-term proton pump inhibitor therapy      tizanidine (ZANAFLEX) 4 MG tablet Take 4 mg by mouth every 8 (eight) hours as needed.   Refills: 5      traZODone (DESYREL) 100 MG tablet Take 1 tablet (100 mg total) by mouth every evening.  Qty: 30 tablet, Refills: 5    Associated Diagnoses: Insomnia, unspecified type             Discharge Procedure Orders (must include Diet, Follow-up, Activity)    Follow Up:  Follow up with PCP as previously scheduled  Resume routine diet.  Activity as tolerated.    No driving day of procedure.

## 2018-03-28 NOTE — ANESTHESIA POSTPROCEDURE EVALUATION
"Anesthesia Post Evaluation    Patient: Amanda Smith    Procedure(s) Performed: Procedure(s) (LRB):  ESOPHAGOGASTRODUODENOSCOPY (EGD) (N/A)  COLONOSCOPY (N/A)    Final Anesthesia Type: general  Patient location during evaluation: PACU  Patient participation: Yes- Able to Participate  Level of consciousness: awake and alert  Post-procedure vital signs: reviewed and stable  Pain management: adequate  Airway patency: patent  PONV status at discharge: No PONV  Anesthetic complications: no      Cardiovascular status: hemodynamically stable and blood pressure returned to baseline  Respiratory status: unassisted, spontaneous ventilation and room air  Hydration status: euvolemic  Follow-up not needed.        Visit Vitals  BP (!) 189/81 (BP Location: Left arm, Patient Position: Lying)   Pulse 89   Temp 36.7 °C (98 °F) (Skin)   Resp 16   Ht 5' 3" (1.6 m)   Wt 81.6 kg (180 lb)   SpO2 97%   Breastfeeding? No   BMI 31.89 kg/m²       Pain/Yue Score: Pain Assessment Performed: Yes (3/28/2018 12:01 PM)  Presence of Pain: denies (3/28/2018 12:01 PM)  Yue Score: 10 (3/28/2018 12:01 PM)      "

## 2018-03-28 NOTE — DISCHARGE INSTRUCTIONS
Discharge Instructions: After Your Surgery  Youve just had surgery. During surgery, you were given medicine called anesthesia to keep you relaxed and free of pain. After surgery, you may have some pain or nausea. This is common. Here are some tips for feeling better and getting well after surgery.     Stay on schedule with your medicine.   Going home  Your healthcare provider will show you how to take care of yourself when you go home. He or she will also answer your questions. Have an adult family member or friend drive you home. For the first 24 hours after your surgery:  · Do not drive or use heavy equipment.  · Do not make important decisions or sign legal papers.  · Do not drink alcohol.  · Have someone stay with you, if needed. He or she can watch for problems and help keep you safe.  Be sure to go to all follow-up visits with your healthcare provider. And rest after your surgery for as long as your healthcare provider tells you to.  Coping with pain  If you have pain after surgery, pain medicine will help you feel better. Take it as told, before pain becomes severe. Also, ask your healthcare provider or pharmacist about other ways to control pain. This might be with heat, ice, or relaxation. And follow any other instructions your surgeon or nurse gives you.    Managing nausea  Some people have an upset stomach after surgery. This is often because of anesthesia, pain, or pain medicine, or the stress of surgery. These tips will help you handle nausea and eat healthy foods as you get better. If you were on a special food plan before surgery, ask your healthcare provider if you should follow it while you get better. These tips may help:  · Do not push yourself to eat. Your body will tell you when to eat and how much.  · Start off with clear liquids and soup. They are easier to digest.  · Next try semi-solid foods, such as mashed potatoes, applesauce, and gelatin, as you feel ready.  · Slowly move to solid  foods. Dont eat fatty, rich, or spicy foods at first.  · Do not force yourself to have 3 large meals a day. Instead eat smaller amounts more often.  · Take pain medicines with a small amount of solid food, such as crackers or toast, to avoid nausea.     Call your surgeon if  · You still have pain an hour after taking medicine. The medicine may not be strong enough.  · You feel too sleepy, dizzy, or groggy. The medicine may be too strong.  · You have side effects like nausea, vomiting, or skin changes, such as rash, itching, or hives.       If you have obstructive sleep apnea  You were given anesthesia medicine during surgery to keep you comfortable and free of pain. After surgery, you may have more apnea spells because of this medicine and other medicines you were given. The spells may last longer than usual.   At home:  · Keep using the continuous positive airway pressure (CPAP) device when you sleep. Unless your healthcare provider tells you not to, use it when you sleep, day or night. CPAP is a common device used to treat obstructive sleep apnea.  · Talk with your provider before taking any pain medicine, muscle relaxants, or sedatives. Your provider will tell you about the possible dangers of taking these medicines.  Date Last Reviewed: 12/1/2016 © 2000-2017 [employer]. [address] [place] [address], [place] [address]. All rights reserved. This information is not intended as a substitute for professional medical care. Always follow your healthcare professional's instructions.

## 2018-03-28 NOTE — PROVATION PATIENT INSTRUCTIONS
Discharge Summary/Instructions after an Endoscopic Procedure  Patient Name: Amanda Smith  Patient MRN: [id number]  Patient YOB: 1950 Wednesday, March 28, 2018  Ba Shen MD  RESTRICTIONS:  During your procedure today, you received medications for sedation.  These   medications may affect your judgment, balance and coordination.  Therefore,   for 24 hours, you have the following restrictions:   - DO NOT drive a car, operate machinery, make legal/financial decisions,   sign important papers or drink alcohol.    ACTIVITY:  The following day: return to full activity including work, except no heavy   lifting, straining or running for 3 days if polyps were removed.  DIET:  Eat and drink normally unless instructed otherwise.     TREATMENT FOR COMMON SIDE EFFECTS:  - Mild abdominal pain, nausea, belching, bloating or excessive gas:  rest,   eat lightly and use a heating pad.  - Sore Throat: treat with throat lozenges and/or gargle with warm salt   water.  - Because air was used during the procedure, expelling large amounts of air   from your rectum or belching is normal.  - If a bowel prep was taken, you may not have a bowel movement for 1-3 days.    This is normal.  SYMPTOMS TO WATCH FOR AND REPORT TO YOUR PHYSICIAN:  1. Abdominal pain or bloating, other than gas cramps.  2. Chest pain.  3. Back pain.  4. Signs of infection such as: chills or fever occurring within 24 hours   after the procedure.  5. Rectal bleeding, which would show as bright red, maroon, or black stools.   (A tablespoon of blood from the rectum is not serious, especially if   hemorrhoids are present.)  6. Vomiting.  7. Weakness or dizziness.  GO DIRECTLY TO THE NEAREST EMERGENCY ROOM IF YOU HAVE ANY OF THE FOLLOWING:      Difficulty breathing              Chills and/or fever over 101 F   Persistent vomiting and/or vomiting blood   Severe abdominal pain   Severe chest pain   Black, tarry stools   Bleeding- more than one  tablespoon   Any other symptom or condition that you feel may need urgent attention  Your doctor recommends these additional instructions:  If any biopsies were taken, your doctors clinic will contact you in 1 to 2   weeks with any results.  We are waiting for your pathology results.   Your physician has recommended a repeat colonoscopy in 10 years for   screening purposes.   You are being discharged to home.  For questions, problems or results please call your physician - Ba Shen MD at Work: [phone].  EMERGENCY PHONE NUMBER: [phone], LAB RESULTS: [phone]  IF A COMPLICATION OR EMERGENCY SITUATION ARISES AND YOU ARE UNABLE TO REACH   YOUR PHYSICIAN - GO DIRECTLY TO THE EMERGENCY ROOM.  ___________________________________________  Nurse Signature  ___________________________________________  Patient/Designated Responsible Party Signature  Ba Shen MD  3/28/2018 11:21:18 AM  This report has been verified and signed electronically.

## 2018-03-28 NOTE — PROVATION PATIENT INSTRUCTIONS
Discharge Summary/Instructions after an Endoscopic Procedure  Patient Name: Amanda Smith  Patient MRN: [id number]  Patient YOB: 1950 Wednesday, March 28, 2018  Ba Shen MD  RESTRICTIONS:  During your procedure today, you received medications for sedation.  These   medications may affect your judgment, balance and coordination.  Therefore,   for 24 hours, you have the following restrictions:   - DO NOT drive a car, operate machinery, make legal/financial decisions,   sign important papers or drink alcohol.    ACTIVITY:  The following day: return to full activity including work, except no heavy   lifting, straining or running for 3 days if polyps were removed.  DIET:  Eat and drink normally unless instructed otherwise.     TREATMENT FOR COMMON SIDE EFFECTS:  - Mild abdominal pain, nausea, belching, bloating or excessive gas:  rest,   eat lightly and use a heating pad.  - Sore Throat: treat with throat lozenges and/or gargle with warm salt   water.  - Because air was used during the procedure, expelling large amounts of air   from your rectum or belching is normal.  - If a bowel prep was taken, you may not have a bowel movement for 1-3 days.    This is normal.  SYMPTOMS TO WATCH FOR AND REPORT TO YOUR PHYSICIAN:  1. Abdominal pain or bloating, other than gas cramps.  2. Chest pain.  3. Back pain.  4. Signs of infection such as: chills or fever occurring within 24 hours   after the procedure.  5. Rectal bleeding, which would show as bright red, maroon, or black stools.   (A tablespoon of blood from the rectum is not serious, especially if   hemorrhoids are present.)  6. Vomiting.  7. Weakness or dizziness.  GO DIRECTLY TO THE NEAREST EMERGENCY ROOM IF YOU HAVE ANY OF THE FOLLOWING:      Difficulty breathing              Chills and/or fever over 101 F   Persistent vomiting and/or vomiting blood   Severe abdominal pain   Severe chest pain   Black, tarry stools   Bleeding- more than one  tablespoon   Any other symptom or condition that you feel may need urgent attention  Your doctor recommends these additional instructions:  If any biopsies were taken, your doctors clinic will contact you in 1 to 2   weeks with any results.  We are waiting for your pathology results.   Continue your present medications.   You are being discharged to home.  For questions, problems or results please call your physician - Ba Shen MD at Work: [phone].  EMERGENCY PHONE NUMBER: [phone], LAB RESULTS: [phone]  IF A COMPLICATION OR EMERGENCY SITUATION ARISES AND YOU ARE UNABLE TO REACH   YOUR PHYSICIAN - GO DIRECTLY TO THE EMERGENCY ROOM.  ___________________________________________  Nurse Signature  ___________________________________________  Patient/Designated Responsible Party Signature  Ba Shen MD  3/28/2018 11:05:15 AM  This report has been verified and signed electronically.

## 2018-03-28 NOTE — H&P
History & Physical - Short Stay  Gastroenterology      SUBJECTIVE:     Procedure: Colonoscopy and EGD    Chief Complaint/Indication for Procedure: Weight Loss, Change in Bowel Habits and N/V    PTA Medications   Medication Sig    cloNIDine 0.2 mg/24 hr td ptwk (CATAPRES) 0.2 mg/24 hr Place 1 patch onto the skin every 7 days. (Patient taking differently: Place 2 patches onto the skin every 7 days. )    gabapentin (NEURONTIN) 300 MG capsule Take 600 mg by mouth 3 (three) times daily.     hydrocodone-acetaminophen 7.5-325mg (NORCO) 7.5-325 mg per tablet Take 1 tablet by mouth every 6 (six) hours as needed for Pain.    nabumetone (RELAFEN) 500 MG tablet Take 500 mg by mouth 2 (two) times daily.    omeprazole (PRILOSEC) 40 MG capsule Take 1 capsule (40 mg total) by mouth once daily.    promethazine (PHENERGAN) 25 MG suppository Place 1 suppository (25 mg total) rectally every 6 (six) hours as needed for Nausea.    ranitidine (ZANTAC) 300 MG tablet Take 1 tablet (300 mg total) by mouth every evening.    tizanidine (ZANAFLEX) 4 MG tablet Take 4 mg by mouth every 8 (eight) hours as needed.     traZODone (DESYREL) 100 MG tablet Take 1 tablet (100 mg total) by mouth every evening.    carvedilol (COREG) 12.5 MG tablet TAKE 1 TABLET BY MOUTH 2 TIMES DAILY WITH MEALS.       Review of patient's allergies indicates:   Allergen Reactions    Duloxetine Nausea And Vomiting    Ondansetron hcl (pf)      Other reaction(s): hypertension  Other reaction(s): chest pain    Sulfa (sulfonamide antibiotics)      Other reaction(s): acute renal failure        Past Medical History:   Diagnosis Date    Anemia of chronic disease 11/30/2015    Anxiety     Arthritis     Blood transfusion     Cataract     Chronic kidney disease     Chronic obstructive pulmonary disease 2/26/2018    Depression     Fibromyalgia     GERD (gastroesophageal reflux disease)     Hypertension     Kidney stones     Renal failure     Temporal  arteritis      Past Surgical History:   Procedure Laterality Date    BACK SURGERY      x5     BREAST SURGERY      Brest tumor removal      cervical fusion\       SECTION      CHOLECYSTECTOMY      COLONOSCOPY  ~    unsure of who did it; internal hemorrhoids, denies colon polyps in the past    HYSTERECTOMY      ovaries removed    MRSA      neck and arm - mosquito bite     UPPER GASTROINTESTINAL ENDOSCOPY  2017    Dr. Shen     Family History   Problem Relation Age of Onset    Diabetes Mother     Cancer Father      Brain    Heart disease Sister     Diabetes Brother     Heart disease Brother     Colon cancer Brother 55    Cancer Brother      colon    Diabetes Maternal Grandmother     Heart disease Maternal Grandfather     GI problems Grandchild      trying to determine if she has crohn's    Crohn's disease Neg Hx     Ulcerative colitis Neg Hx     Esophageal cancer Neg Hx     Stomach cancer Neg Hx      Social History   Substance Use Topics    Smoking status: Current Every Day Smoker     Packs/day: 0.25     Years: 45.00     Types: Cigarettes    Smokeless tobacco: Never Used      Comment: 1 pack a week     Alcohol use No      Comment: Quit 2017         OBJECTIVE:     Vital Signs (Most Recent)       Physical Exam:                                                       GENERAL:  Comfortable, in no acute distress.                                 HEENT EXAM:  Nonicteric.  No adenopathy.  Oropharynx is clear.               NECK:  Supple.                                                               LUNGS:  Clear.                                                               CARDIAC:  Regular rate and rhythm.  S1, S2.  No murmur.                      ABDOMEN:  Soft, positive bowel sounds, nontender.  No hepatosplenomegaly or masses.  No rebound or guarding.                                             EXTREMITIES:  No edema.     MENTAL STATUS:  Normal, alert and  oriented.      ASSESSMENT/PLAN:     Assessment: Weight Loss, Change in Bowel Habits and N/V    Plan: Colonoscopy and EGD    Anesthesia Plan: General    ASA Grade: ASA 2 - Patient with mild systemic disease with no functional limitations    MALLAMPATI SCORE:  I (soft palate, uvula, fauces, and tonsillar pillars visible)

## 2018-03-28 NOTE — ANESTHESIA PREPROCEDURE EVALUATION
03/28/2018  Amanda Smith is a 67 y.o., female.    Anesthesia Evaluation      I have reviewed the Medications.     Review of Systems  Anesthesia Hx:  No problems with previous Anesthesia   Social:  Smoker    Cardiovascular:   Hypertension, poorly controlled    Pulmonary:   COPD    Renal/:   Chronic Renal Disease, CRI renal calculi    Hepatic/GI:   GERD    Musculoskeletal:   Arthritis  Hx cervical fusion Spine Disorders: cervical    Neurological:   Chronic Pain Syndrome   Endocrine:  Endocrine Normal    Psych:   anxiety depression          Physical Exam  General:  Obesity    Airway/Jaw/Neck:  Airway Findings: Mouth Opening: Normal General Airway Assessment: Adult  Mallampati: II  Jaw/Neck Findings:  Neck ROM: Extension Decreased, Mild       Chest/Lungs:  Chest/Lungs Findings: Clear to auscultation, Normal Respiratory Rate     Heart/Vascular:  Heart Findings: Rate: Normal  Rhythm: Regular Rhythm  Sounds: Normal  Heart murmur: negative Vascular Findings: Normal (No carotid bruits.)       Mental Status:  Mental Status Findings:  Cooperative, Alert and Oriented         Anesthesia Plan  Type of Anesthesia, risks & benefits discussed:  Anesthesia Type:  general  Patient's Preference:   Intra-op Monitoring Plan:   Intra-op Monitoring Plan Comments:   Post Op Pain Control Plan:   Post Op Pain Control Plan Comments:   Induction:   IV  Beta Blocker:  Patient is not currently on a Beta-Blocker (No further documentation required).       Informed Consent: Patient understands risks and agrees with Anesthesia plan.  Questions answered. Anesthesia consent signed with patient.  ASA Score: 3     Day of Surgery Review of History & Physical:        Anesthesia Plan Notes: Propofol general.        Ready For Surgery From Anesthesia Perspective.

## 2018-03-28 NOTE — TRANSFER OF CARE
"Anesthesia Transfer of Care Note    Patient: Amanda Smith    Procedure(s) Performed: Procedure(s) (LRB):  ESOPHAGOGASTRODUODENOSCOPY (EGD) (N/A)  COLONOSCOPY (N/A)    Patient location: PACU    Anesthesia Type: general    Transport from OR: Transported from OR on room air with adequate spontaneous ventilation    Post pain: adequate analgesia    Post assessment: no apparent anesthetic complications and tolerated procedure well    Post vital signs: stable    Level of consciousness: awake and sedated    Nausea/Vomiting: no nausea/vomiting    Complications: none    Transfer of care protocol was followed      Last vitals:   Visit Vitals  BP (!) 213/88 (BP Location: Right arm, Patient Position: Sitting)   Pulse 62   Temp 36.3 °C (97.3 °F) (Skin)   Resp 14   Ht 5' 3" (1.6 m)   Wt 81.6 kg (180 lb)   SpO2 98%   Breastfeeding? No   BMI 31.89 kg/m²     "

## 2018-03-29 ENCOUNTER — TELEPHONE (OUTPATIENT)
Dept: NEPHROLOGY | Facility: CLINIC | Age: 68
End: 2018-03-29

## 2018-03-29 ENCOUNTER — NURSE TRIAGE (OUTPATIENT)
Dept: ADMINISTRATIVE | Facility: CLINIC | Age: 68
End: 2018-03-29

## 2018-03-29 RX ORDER — CLONIDINE 0.3 MG/24H
1 PATCH, EXTENDED RELEASE TRANSDERMAL
Qty: 4 PATCH | Refills: 11 | Status: SHIPPED | OUTPATIENT
Start: 2018-03-29 | End: 2018-04-17

## 2018-03-29 NOTE — TELEPHONE ENCOUNTER
Reason for Disposition   BP > 160 / 100 and any cardiac or neurologic symptoms (e.g., chest pain, difficulty breathing, unsteady gait, blurred vision)    Protocols used: ST HIGH BLOOD PRESSURE-A-OH    Amanda is calling to report that her blood pressure is elevated.  Also states she has a headache.  States blood pressure earlier today was 217/99.  Had Amanda check blood pressure while on call.  Current blood pressure is 224/112.  Per protocol advised Amanda to go to ER now.  Amanda states she can not go to ER.  States her grandchild is about to get home and she is the only one to care for him.  Amanda asks if she should have stronger blood pressure patch.  Please contact Amanda to advise as she will not follow disposition provided.

## 2018-03-29 NOTE — TELEPHONE ENCOUNTER
The major problem here is that she has chronic nausea and is unable to hold down her medications much of the time.    Meanwhile, I will max out her clonidine patch.

## 2018-03-29 NOTE — TELEPHONE ENCOUNTER
Patient voiced understanding about increased clonidine patch.  Her son will pick it up for her on the way home.

## 2018-03-31 ENCOUNTER — PATIENT MESSAGE (OUTPATIENT)
Dept: NEPHROLOGY | Facility: CLINIC | Age: 68
End: 2018-03-31

## 2018-04-09 ENCOUNTER — TELEPHONE (OUTPATIENT)
Dept: FAMILY MEDICINE | Facility: CLINIC | Age: 68
End: 2018-04-09

## 2018-04-09 NOTE — TELEPHONE ENCOUNTER
----- Message from Rebecca Lloyd sent at 4/6/2018  4:42 PM CDT -----  Contact: Macie with [facility]  Macie with [facility] calling because patient was in the hospital for weakness and hypertension, needing to be seen in one week, unable to schedule. Please call patient at home to schedule, [phone]. Thanks!

## 2018-04-11 ENCOUNTER — TELEPHONE (OUTPATIENT)
Dept: FAMILY MEDICINE | Facility: CLINIC | Age: 68
End: 2018-04-11

## 2018-04-11 NOTE — TELEPHONE ENCOUNTER
----- Message from Tavia Lezama sent at 4/11/2018  2:28 PM CDT -----  Contact: self                                          attn:  Nurse Ontiveros  Patient [phone] is returning call from Nurse Ontiveros from earlier today/please call

## 2018-04-17 ENCOUNTER — LAB VISIT (OUTPATIENT)
Dept: LAB | Facility: HOSPITAL | Age: 68
End: 2018-04-17
Attending: INTERNAL MEDICINE
Payer: MEDICARE

## 2018-04-17 ENCOUNTER — OFFICE VISIT (OUTPATIENT)
Dept: FAMILY MEDICINE | Facility: CLINIC | Age: 68
End: 2018-04-17
Payer: MEDICARE

## 2018-04-17 VITALS
HEART RATE: 64 BPM | HEIGHT: 63 IN | BODY MASS INDEX: 32.81 KG/M2 | TEMPERATURE: 100 F | DIASTOLIC BLOOD PRESSURE: 70 MMHG | RESPIRATION RATE: 16 BRPM | OXYGEN SATURATION: 98 % | SYSTOLIC BLOOD PRESSURE: 172 MMHG | WEIGHT: 185.19 LBS

## 2018-04-17 DIAGNOSIS — I10 ESSENTIAL HYPERTENSION: ICD-10-CM

## 2018-04-17 DIAGNOSIS — N18.30 CKD (CHRONIC KIDNEY DISEASE) STAGE 3, GFR 30-59 ML/MIN: ICD-10-CM

## 2018-04-17 DIAGNOSIS — J44.9 CHRONIC OBSTRUCTIVE PULMONARY DISEASE, UNSPECIFIED COPD TYPE: ICD-10-CM

## 2018-04-17 DIAGNOSIS — G47.00 INSOMNIA, UNSPECIFIED TYPE: ICD-10-CM

## 2018-04-17 DIAGNOSIS — Z09 HOSPITAL DISCHARGE FOLLOW-UP: Primary | ICD-10-CM

## 2018-04-17 LAB
BACTERIA #/AREA URNS HPF: NORMAL /HPF
BILIRUB UR QL STRIP: NEGATIVE
CLARITY UR: CLEAR
COLOR UR: YELLOW
GLUCOSE UR QL STRIP: NEGATIVE
HGB UR QL STRIP: NEGATIVE
HYALINE CASTS #/AREA URNS LPF: 1 /LPF
KETONES UR QL STRIP: NEGATIVE
LEUKOCYTE ESTERASE UR QL STRIP: NEGATIVE
MICROSCOPIC COMMENT: NORMAL
NITRITE UR QL STRIP: NEGATIVE
PH UR STRIP: 6 [PH] (ref 5–8)
PROT UR QL STRIP: ABNORMAL
RBC #/AREA URNS HPF: 1 /HPF (ref 0–4)
SP GR UR STRIP: >=1.03 (ref 1–1.03)
URN SPEC COLLECT METH UR: ABNORMAL
WBC #/AREA URNS HPF: 1 /HPF (ref 0–5)

## 2018-04-17 PROCEDURE — 99214 OFFICE O/P EST MOD 30 MIN: CPT | Mod: S$PBB,,, | Performed by: NURSE PRACTITIONER

## 2018-04-17 PROCEDURE — 99214 OFFICE O/P EST MOD 30 MIN: CPT | Mod: PBBFAC,PO | Performed by: NURSE PRACTITIONER

## 2018-04-17 PROCEDURE — 81000 URINALYSIS NONAUTO W/SCOPE: CPT | Mod: PO

## 2018-04-17 PROCEDURE — 99999 PR PBB SHADOW E&M-EST. PATIENT-LVL IV: CPT | Mod: PBBFAC,,, | Performed by: NURSE PRACTITIONER

## 2018-04-17 RX ORDER — ASPIRIN 81 MG/1
81 TABLET ORAL DAILY
COMMUNITY
Start: 2018-04-06 | End: 2019-02-22 | Stop reason: ALTCHOICE

## 2018-04-17 RX ORDER — CARVEDILOL 25 MG/1
25 TABLET ORAL 2 TIMES DAILY WITH MEALS
COMMUNITY
End: 2019-08-16 | Stop reason: ALTCHOICE

## 2018-04-17 RX ORDER — BENAZEPRIL/HYDROCHLOROTHIAZIDE 20 MG-25MG
1 TABLET ORAL DAILY
COMMUNITY
End: 2019-02-05

## 2018-04-17 RX ORDER — TRAZODONE HYDROCHLORIDE 100 MG/1
100 TABLET ORAL NIGHTLY
Qty: 30 TABLET | Refills: 5 | Status: SHIPPED | OUTPATIENT
Start: 2018-04-17 | End: 2018-10-01 | Stop reason: SDUPTHER

## 2018-04-17 RX ORDER — AMLODIPINE BESYLATE 10 MG/1
10 TABLET ORAL DAILY
COMMUNITY
End: 2019-08-16 | Stop reason: ALTCHOICE

## 2018-04-17 RX ORDER — GABAPENTIN 800 MG/1
800 TABLET ORAL 3 TIMES DAILY
COMMUNITY
Start: 2018-04-16 | End: 2019-02-22

## 2018-04-17 NOTE — PATIENT INSTRUCTIONS
Amlodipine at night    Benazepril-hydrochlorothiazide in the morning    Carvedilol in the morning and at night

## 2018-04-17 NOTE — PROGRESS NOTES
Subjective:       Patient ID: Amanda Smith is a 67 y.o. female.    Chief Complaint: Hospital Follow Up    HPI   Ms. Smith is a known patient to me. She presents today for hospital follow up. Admitted to [facility] in [place], [place] on 4/4/18 following presyncopal episode. Found to be dehydrated. Admitted--Rehydrated, treated for UTI, stress test s/t LBBB unremarkable. BP and kidney function returned to her normal during admission. DCd home on 4/6/18 with oral antibiotics for UTI and advised to resume BP meds. No episodes since, has finished antibiotics, asymptomatic.     Discharge summary personally reviewed     HTN: elevated today, however patient reports has improved substantially overall at home. Taking all meds in AM--will adjust timing. Appointment with Dr. Valente in May.    Insomnia: sleeping well on trazodone   Vitals:    04/17/18 1048   BP: (!) 172/70   Pulse: 64   Resp: 16   Temp: 99.9 °F (37.7 °C)     Review of Systems   Constitutional: Negative for diaphoresis and fever.   HENT: Negative for facial swelling and trouble swallowing.    Eyes: Negative for discharge and redness.   Respiratory: Negative for cough and shortness of breath.    Cardiovascular: Negative for chest pain and palpitations.   Gastrointestinal: Negative for abdominal pain and diarrhea.   Genitourinary: Negative for difficulty urinating.   Musculoskeletal: Negative for gait problem.   Skin: Negative for rash and wound.   Neurological: Negative for facial asymmetry and speech difficulty.   Psychiatric/Behavioral: Negative for confusion. The patient is not nervous/anxious.        Past Medical History:   Diagnosis Date    Anemia of chronic disease 11/30/2015    Anxiety     Arthritis     Blood transfusion     Cataract     Chronic kidney disease     Chronic obstructive pulmonary disease 2/26/2018    Depression     Fibromyalgia     GERD (gastroesophageal reflux disease)     Hypertension     Kidney stones      Renal failure     Temporal arteritis      Objective:      Physical Exam   Constitutional: She is oriented to person, place, and time. She does not have a sickly appearance. No distress.   HENT:   Head: Normocephalic.   Right Ear: Hearing normal.   Left Ear: Hearing normal.   Nose: Nose normal.   Eyes: Conjunctivae and lids are normal.   Neck: No JVD present. No tracheal deviation present.   Cardiovascular: Normal rate and normal heart sounds.    Pulmonary/Chest: Effort normal and breath sounds normal. She exhibits no tenderness.   Abdominal: Normal appearance. She exhibits no distension.   Musculoskeletal: She exhibits no deformity.   Neurological: She is alert and oriented to person, place, and time.   Skin: She is not diaphoretic. No pallor.   Psychiatric: She has a normal mood and affect. Her speech is normal and behavior is normal. Judgment and thought content normal. Cognition and memory are normal.   Nursing note and vitals reviewed.      Assessment:       1. Hospital discharge follow-up    2. CKD (chronic kidney disease) stage 3, GFR 30-59 ml/min    3. Essential hypertension    4. Chronic obstructive pulmonary disease, unspecified COPD type    5. Insomnia, unspecified type        Plan:       Hospital discharge follow-up    CKD (chronic kidney disease) stage 3, GFR 30-59 ml/min   CMP today     Essential hypertension   Amlodipine qhs   Carvedilol BID--AM and PM   Benazepril-hctz in AM   Continue checking home BP BID--follow up with me if remains>140/90    Chronic obstructive pulmonary disease, unspecified COPD type   Without exacerbation    Insomnia, unspecified type  -     traZODone (DESYREL) 100 MG tablet; Take 1 tablet (100 mg total) by mouth every evening.  Dispense: 30 tablet; Refill: 5        Follow-up for appointment with nephrology in May, sooner with me if BP >140/90, schedule annual with PCP ~July.

## 2018-05-26 DIAGNOSIS — R19.7 DIARRHEA, UNSPECIFIED TYPE: ICD-10-CM

## 2018-05-26 DIAGNOSIS — R11.15 INTRACTABLE CYCLICAL VOMITING WITH NAUSEA: ICD-10-CM

## 2018-05-27 RX ORDER — OMEPRAZOLE 20 MG/1
CAPSULE, DELAYED RELEASE ORAL
Qty: 30 CAPSULE | Refills: 7 | OUTPATIENT
Start: 2018-05-27

## 2018-06-14 DIAGNOSIS — Z12.39 BREAST CANCER SCREENING: ICD-10-CM

## 2018-06-14 DIAGNOSIS — I10 ESSENTIAL HYPERTENSION: ICD-10-CM

## 2018-06-15 RX ORDER — CARVEDILOL 12.5 MG/1
TABLET ORAL
Qty: 180 TABLET | Refills: 0 | Status: SHIPPED | OUTPATIENT
Start: 2018-06-15 | End: 2018-06-25 | Stop reason: DRUGHIGH

## 2018-06-18 ENCOUNTER — TELEPHONE (OUTPATIENT)
Dept: FAMILY MEDICINE | Facility: CLINIC | Age: 68
End: 2018-06-18

## 2018-06-18 DIAGNOSIS — I10 ESSENTIAL HYPERTENSION: Primary | ICD-10-CM

## 2018-06-18 DIAGNOSIS — Z01.818 PRE-OP EVALUATION: ICD-10-CM

## 2018-06-18 DIAGNOSIS — Z01.818 PREOPERATIVE CLEARANCE: ICD-10-CM

## 2018-06-18 DIAGNOSIS — N18.30 CKD (CHRONIC KIDNEY DISEASE) STAGE 3, GFR 30-59 ML/MIN: ICD-10-CM

## 2018-06-18 NOTE — TELEPHONE ENCOUNTER
Pt is having neck surgery 7/12/18. I have scheduled her to come Monday for preop. Can she get her labs an diagnostics done prior to that?  Pended CBC, CMP, CXR, EKG.

## 2018-06-18 NOTE — TELEPHONE ENCOUNTER
"Spoke with pt regarding making appt. States "actually, I need a preop appt, my surgery is scheduled 7/12/18". Will call pt back with appointment options available. Verbalized understanding.   "

## 2018-06-21 ENCOUNTER — CLINICAL SUPPORT (OUTPATIENT)
Dept: CARDIOLOGY | Facility: CLINIC | Age: 68
End: 2018-06-21
Payer: MEDICARE

## 2018-06-21 ENCOUNTER — HOSPITAL ENCOUNTER (OUTPATIENT)
Dept: RADIOLOGY | Facility: HOSPITAL | Age: 68
Discharge: HOME | End: 2018-06-21
Attending: INTERNAL MEDICINE
Payer: MEDICARE

## 2018-06-21 DIAGNOSIS — I10 ESSENTIAL HYPERTENSION: ICD-10-CM

## 2018-06-21 DIAGNOSIS — Z01.818 PRE-OP EVALUATION: ICD-10-CM

## 2018-06-21 DIAGNOSIS — Z01.818 PREOPERATIVE CLEARANCE: ICD-10-CM

## 2018-06-21 PROCEDURE — 93010 ELECTROCARDIOGRAM REPORT: CPT | Mod: S$PBB,,, | Performed by: INTERNAL MEDICINE

## 2018-06-21 PROCEDURE — 93005 ELECTROCARDIOGRAM TRACING: CPT | Mod: PBBFAC,PO | Performed by: INTERNAL MEDICINE

## 2018-06-21 PROCEDURE — 71046 X-RAY EXAM CHEST 2 VIEWS: CPT | Mod: TC,FY,PO

## 2018-06-21 PROCEDURE — 71046 X-RAY EXAM CHEST 2 VIEWS: CPT | Mod: 26,,, | Performed by: RADIOLOGY

## 2018-06-25 ENCOUNTER — OFFICE VISIT (OUTPATIENT)
Dept: CARDIOLOGY | Facility: CLINIC | Age: 68
End: 2018-06-25
Payer: MEDICARE

## 2018-06-25 ENCOUNTER — OFFICE VISIT (OUTPATIENT)
Dept: FAMILY MEDICINE | Facility: CLINIC | Age: 68
End: 2018-06-25
Payer: MEDICARE

## 2018-06-25 VITALS
SYSTOLIC BLOOD PRESSURE: 140 MMHG | WEIGHT: 180.13 LBS | BODY MASS INDEX: 31.92 KG/M2 | DIASTOLIC BLOOD PRESSURE: 80 MMHG | HEIGHT: 63 IN | HEART RATE: 84 BPM

## 2018-06-25 VITALS
TEMPERATURE: 99 F | OXYGEN SATURATION: 96 % | BODY MASS INDEX: 31.71 KG/M2 | DIASTOLIC BLOOD PRESSURE: 76 MMHG | RESPIRATION RATE: 20 BRPM | SYSTOLIC BLOOD PRESSURE: 158 MMHG | HEART RATE: 76 BPM | WEIGHT: 179 LBS | HEIGHT: 63 IN

## 2018-06-25 DIAGNOSIS — I10 ESSENTIAL HYPERTENSION: Primary | ICD-10-CM

## 2018-06-25 DIAGNOSIS — R94.39 ABNORMAL NUCLEAR STRESS TEST: ICD-10-CM

## 2018-06-25 DIAGNOSIS — J44.9 CHRONIC OBSTRUCTIVE PULMONARY DISEASE, UNSPECIFIED COPD TYPE: ICD-10-CM

## 2018-06-25 DIAGNOSIS — R94.39 ABNORMAL STRESS TEST: ICD-10-CM

## 2018-06-25 DIAGNOSIS — N18.30 CKD (CHRONIC KIDNEY DISEASE), STAGE III: ICD-10-CM

## 2018-06-25 DIAGNOSIS — I44.7 LBBB (LEFT BUNDLE BRANCH BLOCK): ICD-10-CM

## 2018-06-25 DIAGNOSIS — F32.A DEPRESSION, UNSPECIFIED DEPRESSION TYPE: ICD-10-CM

## 2018-06-25 DIAGNOSIS — D63.8 ANEMIA, CHRONIC DISEASE: ICD-10-CM

## 2018-06-25 DIAGNOSIS — I10 HYPERTENSION, UNSPECIFIED TYPE: ICD-10-CM

## 2018-06-25 DIAGNOSIS — Z01.818 PREOP EXAMINATION: Primary | ICD-10-CM

## 2018-06-25 DIAGNOSIS — R94.39 ABNORMAL NUCLEAR STRESS TEST: Primary | ICD-10-CM

## 2018-06-25 PROCEDURE — 99999 PR PBB SHADOW E&M-EST. PATIENT-LVL V: CPT | Mod: PBBFAC,,, | Performed by: INTERNAL MEDICINE

## 2018-06-25 PROCEDURE — 99213 OFFICE O/P EST LOW 20 MIN: CPT | Mod: PBBFAC,PO | Performed by: INTERNAL MEDICINE

## 2018-06-25 PROCEDURE — 99214 OFFICE O/P EST MOD 30 MIN: CPT | Mod: S$PBB,,, | Performed by: INTERNAL MEDICINE

## 2018-06-25 PROCEDURE — 99204 OFFICE O/P NEW MOD 45 MIN: CPT | Mod: S$PBB,,, | Performed by: INTERNAL MEDICINE

## 2018-06-25 PROCEDURE — 99999 PR PBB SHADOW E&M-EST. PATIENT-LVL III: CPT | Mod: PBBFAC,,, | Performed by: INTERNAL MEDICINE

## 2018-06-25 PROCEDURE — 99215 OFFICE O/P EST HI 40 MIN: CPT | Mod: PBBFAC,27,PO | Performed by: INTERNAL MEDICINE

## 2018-06-25 RX ORDER — FLUOXETINE 20 MG/1
20 TABLET ORAL DAILY
Qty: 30 TABLET | Refills: 11 | Status: SHIPPED | OUTPATIENT
Start: 2018-06-25 | End: 2018-06-25

## 2018-06-25 RX ORDER — OXYCODONE AND ACETAMINOPHEN 10; 325 MG/1; MG/1
TABLET ORAL
Refills: 0 | COMMUNITY
Start: 2018-05-22 | End: 2018-10-01

## 2018-06-25 NOTE — PROGRESS NOTES
Subjective:    Patient ID:  Amanda Smith is a 67 y.o. female who presents for evaluation of Hypertension (cardiac cleance neck fusion c2-7)      Pt referred for pre-op evaluation for cervical neck fusion. She was admitted to the hospital in [facility] for near syncope in April thought due to dehydration and pain meds. She had an unremarkable Echo and had a SPECT stress perfusion study which was abnormal showing a reversible anteroseptal defect. They mentioned possible breast attenuation artifact but the defect was not fixed. Additionally her ECG showed LBBB which is new from our latest tracing in 12/2015. She has multiple other risk factors including HTN and recent smoking history.         Review of Systems   Constitution: Negative for weight gain and weight loss.   HENT: Negative.    Eyes: Negative.    Cardiovascular: Negative for chest pain, claudication, cyanosis, dyspnea on exertion, irregular heartbeat, leg swelling, orthopnea (no PND), palpitations and syncope.   Respiratory: Negative for cough, hemoptysis, shortness of breath and snoring.    Endocrine: Negative.    Skin: Negative.    Musculoskeletal: Positive for back pain and neck pain. Negative for joint pain, muscle cramps, muscle weakness and myalgias.   Gastrointestinal: Negative for diarrhea, hematemesis, nausea and vomiting.   Genitourinary: Negative.    Neurological: Negative for dizziness, focal weakness, light-headedness, loss of balance, numbness, paresthesias and seizures.   Psychiatric/Behavioral: Negative.         Objective:    Physical Exam   Constitutional: She is oriented to person, place, and time. She appears well-developed and well-nourished.   Eyes: Pupils are equal, round, and reactive to light.   Neck: Normal range of motion. No thyromegaly present.   Cardiovascular: Normal rate, regular rhythm, S1 normal, S2 normal, normal heart sounds, intact distal pulses and normal pulses.   No extrasystoles are present. PMI is not  displaced.  Exam reveals no friction rub.    No murmur heard.  Pulmonary/Chest: Effort normal and breath sounds normal. She has no wheezes. She has no rales. She exhibits no tenderness.   Abdominal: Soft. Bowel sounds are normal. She exhibits no distension and no mass. There is no tenderness.   Musculoskeletal: Normal range of motion. She exhibits no edema.   Neurological: She is alert and oriented to person, place, and time.   Skin: Skin is warm and dry.   Vitals reviewed.      Test(s) Reviewed  I have reviewed the following in detail:  [x] Stress test   [] Angiography   [x] Echocardiogram   [] Labs   [x] Other:  ECG       Assessment:       1. Essential hypertension    2. Abnormal nuclear stress test    3. LBBB (left bundle branch block)    4. Chronic obstructive pulmonary disease, unspecified COPD type         Plan:       Pt with LBBB which is new since 2015 and a stress perfusion study which is abnormal showing a reversible anterior defect in a Pt with multiple coronary risk factors. While LBBB and breast attenuation can cause artifactual perfusion abnormalities, ischemic HD cannot be ruled out.   Due to the above have recommended she have coronary angiogram to r/o obstructive CAD.  She is not happy about the recommendation but agrees to the procedure.  Discussed risks and benefits of cath and coronary angiogram and alternatives. Pt understands, all questions answered and she agrees to proceed with cath.

## 2018-06-25 NOTE — PATIENT INSTRUCTIONS
Angiogram    Arrive for procedure at: [facility] Wed June 27th for 6:30 am for 8 am angiogram    You will receive a phone call from [facility] Pre-Op Department with further instructions prior to your scheduled procedure.    Notify the nurse if you are ALLERGIC TO IODINE.    FASTING: You MAY NOT have anything to eat or drink AFTER MIDNIGHT the day before your procedure. If your procedure is scheduled in the afternoon, you may have a LIGHT BREAKFAST 6-8 hours prior to your procedure.  For example: Two slices of toast; black coffee or black tea.    MEDICATIONS: You may take your regular morning medications with water. If there are any medications that you should not take, you will be instructed to hold them for that morning.    ? CARDIOLOGY PRE-PROCEDURE MEDICATION ORDERS:  ** Please hold any medications that are checked below:    HOLD   # OF DAYS TO HOLD  ? Coumadin   Consult with Coumadin Clinic   ? Xarelto    _DAY BEFORE & DAY OF_  ? Pradaxa  _ DAY BEFORE & DAY OF _  ? Eliquis   _ DAY BEFORE & DAY OF _  ? Metformin    Day before procedure & morning of procedure  ? Short acting insulin   Morning of procedure    CONTINUE the Following Medications   ? Plavix      ? Effient     ? Aspirin    WHAT TO EXPECT:    How long will the procedure take?  The procedure will take an average of 1 - 2 hours to perform.  After the procedure, you will need to lay flat for around 4 - 6 hours to minimize bleeding from the puncture site. If the wrist is accessed you will need to keep your arm still as instructed by the nurse.    When can I go home?  You may be able to be discharged home that same afternoon if there were no complications.  If you have one of the following: balloon; stent; pacemaker or defibrillator procedures, you may spend one night for observation.  Your doctor will determine your discharge based upon your progress.  The results of your procedure will be discussed with you before you are discharged.  Any  further testing or procedures will be scheduled for you either before you leave or you will be instructed to call for a future appointment.      TRANSPORTATION:  PLEASE ARRANGE TO HAVE SOMEONE DRIVE YOU HOME FOLLOWING YOUR PROCEDURE, YOU WILL NOT BE ALLOWED TO DRIVE.

## 2018-06-25 NOTE — PROGRESS NOTES
"Subjective:       Patient ID: Amanda Smith is a 67 y.o. female.    Chief Complaint: Pre-op Exam    Patient here for pre-op clearance for neck surgery on 7/12/18 with Dr Marcos Ryan.  In pain currently and did not take her BP or pain medicine this morning.   Chronic pain s/p multiple low back surgeries.  Dr Josr Tobar    Complains of decreased mood 2nd to pain in her neck.      She had a NM stress test at [facility] in [place] on 4/4/18 while being evaluated for a syncopal episode.  Her stress test showed "mildly abnormal myocardial perfusion with low intensity anterior ischemia but noted in the presence of breast attenuation artifact."  HTN - uncontrolled; patient is non complaint with medicine and Dr glaser.   HLD - mildly uncontrolled goal < 100 age, htn, fh  CKD III/IV - slightly worse but stable when comparing over previous year's range; Dr Valente  COPD - no SOB          Review of Systems   Constitutional: Negative for appetite change and fever.   HENT: Negative for nosebleeds and trouble swallowing.    Eyes: Negative for discharge and visual disturbance.   Respiratory: Negative for choking and shortness of breath.    Cardiovascular: Negative for chest pain and palpitations.   Gastrointestinal: Negative for abdominal pain, nausea and vomiting.   Musculoskeletal: Positive for neck pain. Negative for arthralgias and joint swelling.   Skin: Negative for rash and wound.   Neurological: Negative for dizziness and syncope.   Psychiatric/Behavioral: Positive for dysphoric mood. Negative for confusion and suicidal ideas.       Objective:      Vitals:    06/25/18 1018   BP: (!) 158/76   Pulse: 76   Resp: 20   Temp: 99.1 °F (37.3 °C)     Physical Exam   Constitutional: She appears well-nourished.   Eyes: Conjunctivae and EOM are normal.   Neck: Normal range of motion.   Cardiovascular: Normal rate and regular rhythm.    Pulmonary/Chest: Effort normal and breath sounds normal.   Musculoskeletal:   Normal ROM " bilateral    Neurological: No cranial nerve deficit (grossly intact).   Skin: Skin is warm and dry.   Psychiatric: She has a normal mood and affect.   Alert and orientated   Vitals reviewed.      CXR wnl  Labs reviewed: mild stable anemia, GFR 29  EKG unavailable     Assessment:       1. Preop examination    2. Chronic obstructive pulmonary disease, unspecified COPD type    3. Hypertension, unspecified type    4. CKD (chronic kidney disease), stage III    5. Depression, unspecified depression type    6. Anemia, chronic disease    7. Abnormal stress test        Plan:       Preop examination  -     Ambulatory consult to Cardiology    Chronic obstructive pulmonary disease, unspecified COPD type    Hypertension, unspecified type    CKD (chronic kidney disease), stage III    Depression, unspecified depression type  -     FLUoxetine 20 MG tablet; Take 1 tablet (20 mg total) by mouth once daily.  Dispense: 30 tablet; Refill: 11  -     Ambulatory referral to Outpatient Case Management    Anemia, chronic disease    Abnormal stress test  -     Ambulatory consult to Cardiology            Medication List with Changes/Refills   Current Medications    AMLODIPINE (NORVASC) 10 MG TABLET    Take 10 mg by mouth once daily.    ASPIRIN (ECOTRIN) 81 MG EC TABLET    Take 81 mg by mouth once daily.    BENAZEPRIL-HYDROCHLORTHIAZIDE (LOTENSIN HCT) 20-25 MG TAB    Take 1 tablet by mouth once daily.    CARVEDILOL (COREG) 25 MG TABLET    Take 25 mg by mouth 2 (two) times daily with meals.    GABAPENTIN (NEURONTIN) 800 MG TABLET    Take 800 mg by mouth 3 (three) times daily.    HYDROCODONE-ACETAMINOPHEN 7.5-325MG (NORCO) 7.5-325 MG PER TABLET    Take 1 tablet by mouth every 6 (six) hours as needed for Pain.    OMEPRAZOLE (PRILOSEC) 40 MG CAPSULE    Take 1 capsule (40 mg total) by mouth once daily.    OXYCODONE-ACETAMINOPHEN (PERCOCET)  MG PER TABLET    1 TABLET BY MOUTH FOUR TIMES A DAY AS NEEDED FOR PAIN    PROMETHAZINE (PHENERGAN) 25 MG  "SUPPOSITORY    Place 1 suppository (25 mg total) rectally every 6 (six) hours as needed for Nausea.    RANITIDINE (ZANTAC) 300 MG TABLET    Take 1 tablet (300 mg total) by mouth every evening.    TIZANIDINE (ZANAFLEX) 4 MG TABLET    Take 4 mg by mouth every 8 (eight) hours as needed.     TRAZODONE (DESYREL) 100 MG TABLET    Take 1 tablet (100 mg total) by mouth every evening.   Discontinued Medications    CARVEDILOL (COREG) 12.5 MG TABLET    TAKE 1 TABLET BY MOUTH 2 TIMES DAILY WITH A MEAL     Anemia stable.  BP advised importance of compliance with medication to keep BP controlled for surgery.    Potentially abnormal stress test and EKG unavailable plus patient has transportation issues.  Got apt with cardiology today.  Stress test shows in scanned documents under [employer].   Message sent to nephrology as well.     I have reached out to nephrology who has cleared her from a renal stand point as long as her medications are renally dosed for her decrease GFR.  They also recommend avoiding NSAIDS.    Patient had an appointment with cardiology today.  Per cardiology, EKG shows new LBBB and likely abnormal stress test from [place].  Not cleared for surgery until has heart catheter, which is being scheduled.   Cleared for surgery pending cardiology clearance.      Follow-up in about 3 months (around 9/25/2018).    "This note will not be shared with the patient."  "

## 2018-06-25 NOTE — LETTER
June 25, 2018      Ulices Gaitan MD  [address] [facility] [address] [place] [address]           [facility]  [address] [facility] [address] [place] [address]  Phone: [phone]          Patient: Amanda Smith   MR Number: [id number]   YOB: 1950   Date of Visit: 6/25/2018       Dear Dr. Ulices Gaitan:    Thank you for referring Amanda Smith to me for evaluation. Attached you will find relevant portions of my assessment and plan of care.    If you have questions, please do not hesitate to call me. I look forward to following Amanda Smith along with you.    Sincerely,    Wesley Byers MD    Enclosure  CC:  No Recipients    If you would like to receive this communication electronically, please contact [email or web address] or [phone] to request more information on [facility] access.    For providers and/or their staff who would like to refer a patient to [facility], please contact us through our one-stop-shop provider referral line, [facility], at [phone].    If you feel you have received this communication in error or would no longer like to receive these types of communications, please e-mail [email or web address]

## 2018-06-26 ENCOUNTER — TELEPHONE (OUTPATIENT)
Dept: FAMILY MEDICINE | Facility: CLINIC | Age: 68
End: 2018-06-26

## 2018-06-26 ENCOUNTER — DOCUMENTATION ONLY (OUTPATIENT)
Dept: FAMILY MEDICINE | Facility: CLINIC | Age: 68
End: 2018-06-26

## 2018-06-26 ENCOUNTER — OUTPATIENT CASE MANAGEMENT (OUTPATIENT)
Dept: ADMINISTRATIVE | Facility: OTHER | Age: 68
End: 2018-06-26

## 2018-06-26 NOTE — TELEPHONE ENCOUNTER
----- Message from Rosie Jennings LCSW sent at 6/26/2018  9:53 AM CDT -----  Thank you for the referral.  Patient has been assigned to Elena Sandoval LMSW for low risk screening for Outpatient Case Management.     Reason for referral:  Depression, unspecified depression type    Please contact hospitals at [phone] with any questions.    Thank you,    Rosie Jennings LCSW, CCM

## 2018-06-26 NOTE — TELEPHONE ENCOUNTER
----- Message from Komal Porter sent at 6/26/2018  9:13 AM CDT -----  Contact: Freda from Dr. Ryan office  Freda is calling for pt's medical clearance for sx and can be reached at [phone], fax [phone].    Thank you

## 2018-06-26 NOTE — PROGRESS NOTES
Thank you for the referral.  Patient has been assigned to Elena Sandoval LMSW for low risk screening for Outpatient Case Management.     Reason for referral:  Depression, unspecified depression type    Please contact [facility] at ext. [phone] with any questions.    Thank you,    Rosie Jennings LCSW, CCM

## 2018-06-26 NOTE — PROGRESS NOTES
"Clearance request papers received from [facility]. Placed in blue "working on" folder on my desk.  "

## 2018-06-27 ENCOUNTER — LAB VISIT (OUTPATIENT)
Dept: LAB | Facility: HOSPITAL | Age: 68
End: 2018-06-27
Attending: INTERNAL MEDICINE
Payer: MEDICARE

## 2018-06-27 ENCOUNTER — OUTPATIENT CASE MANAGEMENT (OUTPATIENT)
Dept: ADMINISTRATIVE | Facility: OTHER | Age: 68
End: 2018-06-27

## 2018-06-27 ENCOUNTER — TELEPHONE (OUTPATIENT)
Dept: CARDIOLOGY | Facility: CLINIC | Age: 68
End: 2018-06-27

## 2018-06-27 ENCOUNTER — TELEPHONE (OUTPATIENT)
Dept: FAMILY MEDICINE | Facility: CLINIC | Age: 68
End: 2018-06-27

## 2018-06-27 ENCOUNTER — OFFICE VISIT (OUTPATIENT)
Dept: NEPHROLOGY | Facility: CLINIC | Age: 68
End: 2018-06-27
Payer: MEDICARE

## 2018-06-27 VITALS
DIASTOLIC BLOOD PRESSURE: 70 MMHG | SYSTOLIC BLOOD PRESSURE: 142 MMHG | WEIGHT: 180.5 LBS | HEART RATE: 66 BPM | BODY MASS INDEX: 31.97 KG/M2 | OXYGEN SATURATION: 98 %

## 2018-06-27 DIAGNOSIS — M79.7 FIBROMYALGIA: ICD-10-CM

## 2018-06-27 DIAGNOSIS — N18.30 CKD (CHRONIC KIDNEY DISEASE) STAGE 3, GFR 30-59 ML/MIN: Primary | ICD-10-CM

## 2018-06-27 DIAGNOSIS — I10 ESSENTIAL HYPERTENSION: ICD-10-CM

## 2018-06-27 DIAGNOSIS — N18.30 CKD (CHRONIC KIDNEY DISEASE) STAGE 3, GFR 30-59 ML/MIN: ICD-10-CM

## 2018-06-27 DIAGNOSIS — D63.8 ANEMIA OF CHRONIC DISEASE: ICD-10-CM

## 2018-06-27 DIAGNOSIS — N17.9 AKI (ACUTE KIDNEY INJURY): Primary | ICD-10-CM

## 2018-06-27 DIAGNOSIS — E66.9 OBESITY (BMI 30.0-34.9): ICD-10-CM

## 2018-06-27 DIAGNOSIS — I44.7 LBBB (LEFT BUNDLE BRANCH BLOCK): ICD-10-CM

## 2018-06-27 DIAGNOSIS — N39.0 UTI (URINARY TRACT INFECTION), UNCOMPLICATED: ICD-10-CM

## 2018-06-27 LAB
BACTERIA #/AREA URNS HPF: ABNORMAL /HPF
BILIRUB UR QL STRIP: NEGATIVE
CLARITY UR: CLEAR
COLOR UR: YELLOW
GLUCOSE UR QL STRIP: NEGATIVE
HGB UR QL STRIP: NEGATIVE
HYALINE CASTS #/AREA URNS LPF: 0 /LPF
KETONES UR QL STRIP: NEGATIVE
LEUKOCYTE ESTERASE UR QL STRIP: NEGATIVE
MICROSCOPIC COMMENT: ABNORMAL
NITRITE UR QL STRIP: NEGATIVE
PH UR STRIP: 6 [PH] (ref 5–8)
PROT UR QL STRIP: ABNORMAL
RBC #/AREA URNS HPF: 1 /HPF (ref 0–4)
SP GR UR STRIP: 1.02 (ref 1–1.03)
SQUAMOUS #/AREA URNS HPF: 2 /HPF
URN SPEC COLLECT METH UR: ABNORMAL
WBC #/AREA URNS HPF: 2 /HPF (ref 0–5)

## 2018-06-27 PROCEDURE — 81000 URINALYSIS NONAUTO W/SCOPE: CPT | Mod: PO

## 2018-06-27 PROCEDURE — 87086 URINE CULTURE/COLONY COUNT: CPT

## 2018-06-27 PROCEDURE — 99214 OFFICE O/P EST MOD 30 MIN: CPT | Mod: S$PBB,,, | Performed by: INTERNAL MEDICINE

## 2018-06-27 PROCEDURE — 99214 OFFICE O/P EST MOD 30 MIN: CPT | Mod: PBBFAC,PO | Performed by: INTERNAL MEDICINE

## 2018-06-27 PROCEDURE — 99999 PR PBB SHADOW E&M-EST. PATIENT-LVL IV: CPT | Mod: PBBFAC,,, | Performed by: INTERNAL MEDICINE

## 2018-06-27 NOTE — PROGRESS NOTES
"This LMSW received a referral on the above patient.   Reason for referral: Depression, unspecified depression type  Name of the community resource that was provided: Mental Health/Counseling Resources, Dentures Resources, Advance Directive Brochure   Resource given to: Patient via US Mail    LMSW completed assessment with patient. Patient reports she lives with her son and daughter in law and reports she is independent with ADLs. Patient does not use assistive devices to ambulate. Patient reports being upset about having procedure cancelled today. Patient reports she was due to have surgery and it was cancelled at the last minute. Patient states, "All I want is to have my surgery". Patient reports having difficulties functioning as a result of condition of her neck. Patient was tearful describing her desire to complete neck surgery. Patient confirms she suffers from depression however she reports no suicidal/homicidal thoughts. Patient offered counseling resources to address emotional pain and depressed moods. Patient reports she not sure she will use resources. LMSW encouraged patient to review counseling resources upon receipt to identify potential provider. Patient reports also needing help with getting her teeth "fix". LMSW advised patient that she will be provided with affordable dental provider. No other needs identified at this time. Referral source notified.     "

## 2018-06-27 NOTE — TELEPHONE ENCOUNTER
----- Message from Elena Sandoval LMSW sent at 6/27/2018 12:07 PM CDT -----  This LMSW received a referral on the above patient. This LMSW provided patient/caregiver with the following resource: Mental Health/Counseling Resources, Dentures Resources, Advance Directive Brochure.    Thank you for the referral,    Elena Sandoval LMSW

## 2018-06-27 NOTE — TELEPHONE ENCOUNTER
----- Message from Miley Arenas sent at 6/27/2018 10:11 AM CDT -----  Please call patient in regards to the scheduled angiogram test on today.  Please call [phone].

## 2018-06-27 NOTE — PROGRESS NOTES
Attempt #:  1  This LMSW attempted to reach patient/caregiver to provide resource and patient reported she was busy. Patient requested call back in 15 minutes. LMSW will attempt to contact patient at requested time.

## 2018-06-27 NOTE — TELEPHONE ENCOUNTER
Attempted to call the patient regarding her LHC on Friday no answer. Could not leave a message voicemail not setup

## 2018-06-27 NOTE — TELEPHONE ENCOUNTER
----- Message from Coleen Chang RN sent at 6/27/2018  2:46 PM CDT -----  Regarding: patient to be out of town  Patient states she will be out of town until 7/5, and will be unable to do procedure on Friday 6/26/18. Instructed to call your office.

## 2018-06-27 NOTE — TELEPHONE ENCOUNTER
----- Message from Mayank Mercado sent at 6/27/2018 11:55 AM CDT -----  Contact: self   Patient need to speak with a nurse regarding some testing, please call back at [phone] ([phone])

## 2018-06-27 NOTE — TELEPHONE ENCOUNTER
Pt angiogram was cancelled this morning due to her kidney function.  Please sign referral for nephrology.

## 2018-06-27 NOTE — PATIENT INSTRUCTIONS
1. Can take sucralfate. Take for a week or so then try to wean off omeprazole, or 1 in 3 days  2. Imodium over the counter for diarrhea, take only one to start (not two)  3. Check on generic Prozac  4. For the next angiogram push fluids the day before, the day of and the day after  5. Also for the next angiogram hold the benazepril/HCT the day before and the day of to ensure the blood pressure in the kidneys is high enough   6. Urine test today  7. July 6th blood test to include the creatinine

## 2018-06-28 LAB — BACTERIA UR CULT: NO GROWTH

## 2018-06-28 RX ORDER — SERTRALINE HYDROCHLORIDE 50 MG/1
50 TABLET, FILM COATED ORAL DAILY
Qty: 30 TABLET | Refills: 11 | Status: SHIPPED | OUTPATIENT
Start: 2018-06-28 | End: 2019-02-22

## 2018-06-28 NOTE — TELEPHONE ENCOUNTER
Placed call to pt, no answer at this time. Left voicemail to call clinic. Call back number provided.

## 2018-07-06 ENCOUNTER — TELEPHONE (OUTPATIENT)
Dept: NEPHROLOGY | Facility: CLINIC | Age: 68
End: 2018-07-06

## 2018-07-08 ENCOUNTER — TELEPHONE (OUTPATIENT)
Dept: NEPHROLOGY | Facility: CLINIC | Age: 68
End: 2018-07-08

## 2018-07-09 RX ORDER — SUCRALFATE 1 G/1
TABLET ORAL
Qty: 100 TABLET | Refills: 2 | Status: SHIPPED | OUTPATIENT
Start: 2018-07-09 | End: 2018-10-21 | Stop reason: SDUPTHER

## 2018-07-09 NOTE — TELEPHONE ENCOUNTER
Spoke to the patient and she will get her BMP on 7-16-18, she is babysitting her grand kids in [facility] this week so she will not be able to get it sooner.

## 2018-07-09 NOTE — PROGRESS NOTES
Subjective:       Patient ID: Amanda Smith is a 67 y.o. White female who presents for re-evaluation of progression of Consult and Chronic Kidney Disease    HPI     This is a CKD pt of Dr. Valente who needed to be seen today due to cancellation of [facility] due to her increased creatinine. Her sCR fluctuates and has been this high before. No recent illness nor new medications/ABX. She feels well except for her ongoing neck/upper back/distal UE pain for which she was receiving cardiac clearance.    She has ongoing diarrhea but is reluctant to take anything for it.      Review of Systems   Constitutional: Negative for appetite change, fatigue, fever and unexpected weight change.   Respiratory: Positive for shortness of breath.    Cardiovascular: Negative for leg swelling.   Gastrointestinal: Positive for diarrhea. Negative for nausea and vomiting.   Genitourinary: Negative for difficulty urinating, dysuria, flank pain and hematuria.       Objective:      Physical Exam   Constitutional: She is oriented to person, place, and time. She appears well-nourished. No distress.   HENT:   Mouth/Throat: Oropharynx is clear and moist.   Neck: No JVD present.   Cardiovascular: S1 normal and S2 normal.  Exam reveals no friction rub.    Pulmonary/Chest: Breath sounds normal. She has no wheezes. She has no rales.   Abdominal: Soft.   Musculoskeletal: She exhibits no edema.   Neurological: She is alert and oriented to person, place, and time.   Skin: Skin is warm and dry.   Psychiatric: She has a normal mood and affect.   Vitals reviewed.      Assessment:       1. CKD (chronic kidney disease) stage 3, GFR 30-59 ml/min    2. UTI (urinary tract infection), uncomplicated    3. Essential hypertension    4. LBBB (left bundle branch block)    5. Anemia of chronic disease    6. Obesity (BMI 30.0-34.9)    7. Fibromyalgia        Plan:              Not Acute Kidney Injury--her sCr fluctuates but would prefer kidney function to be optimized  "prior to an elective LHC.     Due to her ongoing diarrhea I asked her to push fluids and use OTC Imodium    Long discussion that even if she did not have an upcoming surgery for "cardiac clearance"  her new LBBB and previous depressed EF warrants an elective LHC. Also explained that in this setting the risk of significant kidney injury is low to medium.     U/a today and repeat BMP this Friday   "

## 2018-07-16 ENCOUNTER — LAB VISIT (OUTPATIENT)
Dept: LAB | Facility: HOSPITAL | Age: 68
End: 2018-07-16
Attending: INTERNAL MEDICINE
Payer: MEDICARE

## 2018-07-16 DIAGNOSIS — N18.30 CKD (CHRONIC KIDNEY DISEASE) STAGE 3, GFR 30-59 ML/MIN: ICD-10-CM

## 2018-07-16 LAB
ANION GAP SERPL CALC-SCNC: 10 MMOL/L
BUN SERPL-MCNC: 38 MG/DL
CALCIUM SERPL-MCNC: 9.9 MG/DL
CHLORIDE SERPL-SCNC: 107 MMOL/L
CO2 SERPL-SCNC: 22 MMOL/L
CREAT SERPL-MCNC: 2 MG/DL
EST. GFR  (AFRICAN AMERICAN): 29.6 ML/MIN/1.73 M^2
EST. GFR  (NON AFRICAN AMERICAN): 25.6 ML/MIN/1.73 M^2
GLUCOSE SERPL-MCNC: 91 MG/DL
POTASSIUM SERPL-SCNC: 4.9 MMOL/L
SODIUM SERPL-SCNC: 139 MMOL/L

## 2018-07-16 PROCEDURE — 80048 BASIC METABOLIC PNL TOTAL CA: CPT

## 2018-07-16 PROCEDURE — 36415 COLL VENOUS BLD VENIPUNCTURE: CPT | Mod: PO

## 2018-08-27 DIAGNOSIS — G47.00 INSOMNIA, UNSPECIFIED TYPE: ICD-10-CM

## 2018-08-27 RX ORDER — TRAZODONE HYDROCHLORIDE 50 MG/1
50 TABLET ORAL NIGHTLY
Qty: 30 TABLET | Refills: 3 | OUTPATIENT
Start: 2018-08-27 | End: 2019-08-27

## 2018-09-15 DIAGNOSIS — I10 ESSENTIAL HYPERTENSION: ICD-10-CM

## 2018-09-17 RX ORDER — CARVEDILOL 12.5 MG/1
TABLET ORAL
Qty: 180 TABLET | Refills: 2 | Status: SHIPPED | OUTPATIENT
Start: 2018-09-17 | End: 2018-10-01 | Stop reason: DRUGHIGH

## 2018-09-18 ENCOUNTER — PATIENT OUTREACH (OUTPATIENT)
Dept: ADMINISTRATIVE | Facility: HOSPITAL | Age: 68
End: 2018-09-18

## 2018-09-18 NOTE — LETTER
September 18, 2018    Amanda LUDWIN Sarah  [address] [address]             [facility]  [address]  [facility] [address]  Phone: [phone] Dear Ms. Morsejadonmattabiodun:    [facility] is committed to your overall health.  To help you get the most out of each of your visits, we will review your information to make sure you are up to date on all of your recommended tests and/or procedures.      Dr. Gaitan  has found that your chart shows you may be due for the following:    Tetanus , Pneumonia , Shingles Immunizations  Influenza Vaccine  Mammogram  Osteoporosis screening (DEXA SCAN)    If you have had any of the above done at another facility, please bring the records or information with you so that your record at [facility] will be complete.  If you would like to schedule any of these, please contact me.    If you are currently taking medication, please bring it with you to your appointment for review.    If you have any questions or concerns, please don't hesitate to call.    Sincerely,  Sylwia Payne  Clinical Care Coordinator  [facility]  [address] [place] [address]  Phone: [phone]   Fax: [phone]

## 2018-09-18 NOTE — PROGRESS NOTES
Health Maintenance Due   Topic Date Due    TETANUS VACCINE  08/23/1970    Mammogram  08/23/1992    DEXA SCAN  08/23/1992    Zoster Vaccine  08/23/2012    Pneumococcal (65+) (1 of 2 - PCV13) 08/23/2017    Influenza Vaccine  08/01/2018     Pre-visit outreach via mail

## 2018-09-28 ENCOUNTER — TELEPHONE (OUTPATIENT)
Dept: FAMILY MEDICINE | Facility: CLINIC | Age: 68
End: 2018-09-28

## 2018-09-30 DIAGNOSIS — G47.00 INSOMNIA, UNSPECIFIED TYPE: ICD-10-CM

## 2018-09-30 RX ORDER — TRAZODONE HYDROCHLORIDE 100 MG/1
TABLET ORAL
Qty: 30 TABLET | Refills: 5 | Status: CANCELLED | OUTPATIENT
Start: 2018-09-30

## 2018-10-01 ENCOUNTER — OFFICE VISIT (OUTPATIENT)
Dept: FAMILY MEDICINE | Facility: CLINIC | Age: 68
End: 2018-10-01
Payer: MEDICARE

## 2018-10-01 VITALS
BODY MASS INDEX: 28.82 KG/M2 | WEIGHT: 162.69 LBS | HEIGHT: 63 IN | OXYGEN SATURATION: 97 % | SYSTOLIC BLOOD PRESSURE: 138 MMHG | HEART RATE: 53 BPM | RESPIRATION RATE: 18 BRPM | DIASTOLIC BLOOD PRESSURE: 72 MMHG

## 2018-10-01 DIAGNOSIS — Z12.31 ENCOUNTER FOR SCREENING MAMMOGRAM FOR BREAST CANCER: ICD-10-CM

## 2018-10-01 DIAGNOSIS — G47.00 INSOMNIA, UNSPECIFIED TYPE: ICD-10-CM

## 2018-10-01 DIAGNOSIS — Z72.0 TOBACCO ABUSE: ICD-10-CM

## 2018-10-01 DIAGNOSIS — T30.0 BURN: ICD-10-CM

## 2018-10-01 DIAGNOSIS — I10 ESSENTIAL HYPERTENSION: Primary | ICD-10-CM

## 2018-10-01 DIAGNOSIS — F32.A DEPRESSION, UNSPECIFIED DEPRESSION TYPE: ICD-10-CM

## 2018-10-01 PROCEDURE — 99999 PR PBB SHADOW E&M-EST. PATIENT-LVL III: CPT | Mod: PBBFAC,,, | Performed by: INTERNAL MEDICINE

## 2018-10-01 PROCEDURE — 99214 OFFICE O/P EST MOD 30 MIN: CPT | Mod: S$PBB,,, | Performed by: INTERNAL MEDICINE

## 2018-10-01 PROCEDURE — 90662 IIV NO PRSV INCREASED AG IM: CPT | Mod: PBBFAC,PO

## 2018-10-01 PROCEDURE — 99213 OFFICE O/P EST LOW 20 MIN: CPT | Mod: PBBFAC,PO,25 | Performed by: INTERNAL MEDICINE

## 2018-10-01 RX ORDER — VARENICLINE TARTRATE 0.5 (11)-1
KIT ORAL
Qty: 1 PACKAGE | Refills: 0 | Status: SHIPPED | OUTPATIENT
Start: 2018-10-01 | End: 2019-02-22

## 2018-10-01 RX ORDER — MUPIROCIN 20 MG/G
OINTMENT TOPICAL 3 TIMES DAILY
Qty: 15 G | Refills: 0 | Status: SHIPPED | OUTPATIENT
Start: 2018-10-01 | End: 2018-10-11

## 2018-10-01 RX ORDER — TRAZODONE HYDROCHLORIDE 100 MG/1
100 TABLET ORAL NIGHTLY
Qty: 30 TABLET | Refills: 5 | Status: SHIPPED | OUTPATIENT
Start: 2018-10-01 | End: 2019-04-20 | Stop reason: SDUPTHER

## 2018-10-01 RX ORDER — VARENICLINE TARTRATE 1 MG/1
1 TABLET, FILM COATED ORAL 2 TIMES DAILY
Qty: 60 TABLET | Refills: 5 | Status: SHIPPED | OUTPATIENT
Start: 2018-10-31 | End: 2018-11-30

## 2018-10-01 NOTE — PROGRESS NOTES
"Subjective:       Patient ID: Amanda Smith is a 68 y.o. female.    Chief Complaint: Hypertension    Has received instructions from nephrology so she can get her angiogram to obtain pre-op clearance from cardiology for neck surgery on 7/12/18 with Dr Marcos Ryan.  Chronic pain s/p multiple low back surgeries.  Dr Josr Tobar    Recall: She had a NM stress test at [facility] in [place] on 4/4/18 while being evaluated for a syncopal episode.  Her stress test showed "mildly abnormal myocardial perfusion with low intensity anterior ischemia but noted in the presence of breast attenuation artifact."    Depression - controlled; doing great.  Tobacco abuse - wants to try Chantix   Insomnia - controlled with trazadone     Complains of burning her hand 2 days ago.     HTN - controlled; patient is non complaint with medicine and Dr glaser.   HLD - mildly uncontrolled goal < 100 age, htn, fh  CKD III/IV - slightly worse but stable when comparing over previous year's range; renal  COPD - no SOB          Review of Systems   Constitutional: Negative for appetite change and fever.   HENT: Negative for nosebleeds and trouble swallowing.    Eyes: Negative for discharge and visual disturbance.   Respiratory: Negative for choking and shortness of breath.    Cardiovascular: Negative for chest pain and palpitations.   Gastrointestinal: Negative for abdominal pain, nausea and vomiting.   Musculoskeletal: Negative for arthralgias and joint swelling.   Skin: Negative for rash and wound.   Neurological: Negative for dizziness and syncope.   Psychiatric/Behavioral: Negative for confusion and dysphoric mood.       Objective:      Vitals:    10/01/18 1036   BP: 138/72   Pulse: (!) 53   Resp: 18     Physical Exam   Constitutional: She appears well-nourished.   Eyes: Conjunctivae and EOM are normal.   Neck: Normal range of motion.   Cardiovascular: Normal rate and regular rhythm.   Pulmonary/Chest: Effort normal and breath sounds normal. "   Musculoskeletal:   Normal ROM bilateral    Neurological: No cranial nerve deficit (grossly intact).   Skin: Skin is warm and dry.   5 cm x 1 c linear lesion across dorsal right hand with mild erythema surrounding it.    Psychiatric: She has a normal mood and affect.   Alert and orientated   Vitals reviewed.        Assessment:       1. Essential hypertension    2. Tobacco abuse    3. Insomnia, unspecified type    4. Depression, unspecified depression type    5. Burn    6. Encounter for screening mammogram for breast cancer        Plan:       Essential hypertension    Tobacco abuse  -     varenicline (CHANTIX KATHERINE) 0.5 mg (11)- 1 mg (42) tablet; Take as directed on pack  Dispense: 1 Package; Refill: 0  -     varenicline (CHANTIX) 1 mg Tab; Take 1 tablet (1 mg total) by mouth 2 (two) times daily.  Dispense: 60 tablet; Refill: 5    Insomnia, unspecified type  -     traZODone (DESYREL) 100 MG tablet; Take 1 tablet (100 mg total) by mouth every evening.  Dispense: 30 tablet; Refill: 5    Depression, unspecified depression type    Burn  -     mupirocin (BACTROBAN) 2 % ointment; Apply topically 3 (three) times daily. for 10 days  Dispense: 15 g; Refill: 0    Encounter for screening mammogram for breast cancer  -     Mammo Digital Screening Bilat with CAD; Future; Expected date: 10/01/2018               Medication List           Accurate as of 10/1/18 10:56 AM. If you have any questions, ask your nurse or doctor.               START taking these medications    mupirocin 2 % ointment  Commonly known as:  BACTROBAN  Apply topically 3 (three) times daily. for 10 days  Started by:  Ulices Gaitan MD     * varenicline 0.5 mg (11)- 1 mg (42) tablet  Commonly known as:  CHANTIX KATHERINE  Take as directed on pack  Started by:  Ulices Gaitan MD     * varenicline 1 mg Tab  Commonly known as:  CHANTIX  Take 1 tablet (1 mg total) by mouth 2 (two) times daily.  Start taking on:  10/31/2018  Started by:  Ulices Gaitan MD         * This list  has 2 medication(s) that are the same as other medications prescribed for you. Read the directions carefully, and ask your doctor or other care provider to review them with you.            CHANGE how you take these medications    carvedilol 25 MG tablet  Commonly known as:  COREG  What changed:  Another medication with the same name was removed. Continue taking this medication, and follow the directions you see here.  Changed by:  Ulices Gaitan MD     sucralfate 1 gram tablet  Commonly known as:  CARAFATE  TAKE 1 TABLET BY MOUTH 3 TIMES A DAY  What changed:  See the new instructions.        CONTINUE taking these medications    amLODIPine 10 MG tablet  Commonly known as:  NORVASC     aspirin 81 MG EC tablet  Commonly known as:  ECOTRIN     benazepril-hydrochlorthiazide 20-25 mg Tab  Commonly known as:  LOTENSIN HCT     gabapentin 800 MG tablet  Commonly known as:  NEURONTIN     HYDROcodone-acetaminophen 7.5-325 mg per tablet  Commonly known as:  NORCO     omeprazole 40 MG capsule  Commonly known as:  PRILOSEC  Take 1 capsule (40 mg total) by mouth once daily.     promethazine 25 MG suppository  Commonly known as:  PHENERGAN  Place 1 suppository (25 mg total) rectally every 6 (six) hours as needed for Nausea.     sertraline 50 MG tablet  Commonly known as:  ZOLOFT  Take 1 tablet (50 mg total) by mouth once daily.     tiZANidine 4 MG tablet  Commonly known as:  ZANAFLEX     traZODone 100 MG tablet  Commonly known as:  DESYREL  Take 1 tablet (100 mg total) by mouth every evening.        STOP taking these medications    oxyCODONE-acetaminophen  mg per tablet  Commonly known as:  PERCOCET  Stopped by:  Ulices Gaitan MD     ranitidine 300 MG tablet  Commonly known as:  ZANTAC  Stopped by:  Ulices Gaitan MD           Where to Get Your Medications      These medications were sent to [facility]/pharmacy #[id number] - [place] - [address] [address]  [address] [address]    Phone:  [phone]   · mupirocin 2 %  "ointment  · traZODone 100 MG tablet  · varenicline 0.5 mg (11)- 1 mg (42) tablet  · varenicline 1 mg Tab       If mood worsens, DC Chantix and contact me.  Will contact cardiology to proceed with clearance.   Continue current management    Counseled on regular exercise, maintenance of a healthy weight, balanced diet rich in fruits/vegetables and lean protein, and avoidance of unhealthy habits like smoking and excessive alcohol intake.   Also, counseled on importance of being compliant with medication, health appointments, diet and exercise.     Follow-up in about 6 months (around 4/1/2019).    "This note will not be shared with the patient."  "

## 2018-10-22 RX ORDER — SUCRALFATE 1 G/1
TABLET ORAL
Qty: 100 TABLET | Refills: 2 | Status: SHIPPED | OUTPATIENT
Start: 2018-10-22 | End: 2019-02-22

## 2018-10-27 DIAGNOSIS — Z72.0 TOBACCO ABUSE: ICD-10-CM

## 2018-10-29 RX ORDER — VARENICLINE TARTRATE 0.5 (11)-1
KIT ORAL
Refills: 0 | OUTPATIENT
Start: 2018-10-29

## 2018-10-29 NOTE — TELEPHONE ENCOUNTER
Why is pharmacy sending a refill request for Chantix starter pack? I sent a 2nd post dated rx for Chantix maintenance as well as starter pack 1 month ago.

## 2018-11-13 ENCOUNTER — TELEPHONE (OUTPATIENT)
Dept: FAMILY MEDICINE | Facility: CLINIC | Age: 68
End: 2018-11-13

## 2018-11-13 NOTE — TELEPHONE ENCOUNTER
----- Message from Naty Carcamo sent at 11/13/2018  3:39 PM CST -----  Contact: [phone]  Patient is returning nurse's phone call.  Please call patient back at [phone].

## 2018-11-13 NOTE — TELEPHONE ENCOUNTER
----- Message from Altagracia Rosasalvador sent at 11/13/2018 11:09 AM CST -----  Contact: Self  She moved and the room she is staying in use to have cats in it and she is highly allergic to cats.  She is afraid to take anything over the counter without checking with the doctor, and a prescription is covered by medicare.  Please call back to advise at [phone] (home).  Thanks

## 2018-11-14 RX ORDER — LEVOCETIRIZINE DIHYDROCHLORIDE 5 MG/1
5 TABLET, FILM COATED ORAL NIGHTLY
Qty: 30 TABLET | Refills: 11 | Status: SHIPPED | OUTPATIENT
Start: 2018-11-14 | End: 2019-02-22 | Stop reason: ALTCHOICE

## 2018-11-14 NOTE — TELEPHONE ENCOUNTER
Patient states She moved and the room she is staying use to have cats in it and she is highly allergic to cats.  She is afraid to take anything over the counter without checking with the doctor, and a prescription is covered by medicare. symptoms - cough, runny eyes, puffy eyes - please advise

## 2019-01-02 ENCOUNTER — TELEPHONE (OUTPATIENT)
Dept: NEPHROLOGY | Facility: CLINIC | Age: 69
End: 2019-01-02

## 2019-01-02 DIAGNOSIS — R30.0 DYSURIA: Primary | ICD-10-CM

## 2019-01-02 NOTE — TELEPHONE ENCOUNTER
"Patient had URI with phlegm and cough.  Abdominal pain onset a few days ago, then a pain on her R side that persists.  She ran fever for a couple of days and now has a "really nasty" vaginal discharge.      [employer] brand cold medicine is what she took for her recent illness.      She wants to be sure she doesn't have a UTI.      Please advise re: lab and follow up.   "

## 2019-01-02 NOTE — TELEPHONE ENCOUNTER
----- Message from Janey Okeefe sent at 1/2/2019  1:40 PM CST -----    Patient  Is calling to  Request  blood  Work  Orders  // please call [phone]

## 2019-01-03 ENCOUNTER — LAB VISIT (OUTPATIENT)
Dept: LAB | Facility: HOSPITAL | Age: 69
End: 2019-01-03
Attending: INTERNAL MEDICINE
Payer: MEDICARE

## 2019-01-03 ENCOUNTER — TELEPHONE (OUTPATIENT)
Dept: NEPHROLOGY | Facility: CLINIC | Age: 69
End: 2019-01-03

## 2019-01-03 DIAGNOSIS — R30.0 DYSURIA: ICD-10-CM

## 2019-01-03 PROCEDURE — 81001 URINALYSIS AUTO W/SCOPE: CPT

## 2019-01-03 PROCEDURE — 87086 URINE CULTURE/COLONY COUNT: CPT

## 2019-01-03 NOTE — TELEPHONE ENCOUNTER
Spoke to pt. And she would like to  get lab and urine Culture.  will put orders in the system and I will schedule and send pt. A message on her portal once scheduled. She is aware to see her PCP for the other issues she is having.

## 2019-01-03 NOTE — TELEPHONE ENCOUNTER
We can certainly check a ua and uc but sounds like she needs to see her PCP or urgent care for her URI and vaginal discharge issues.

## 2019-01-04 LAB
AMORPH CRY UR QL COMP ASSIST: ABNORMAL
BACTERIA #/AREA URNS AUTO: ABNORMAL /HPF
BILIRUB UR QL STRIP: NEGATIVE
CLARITY UR REFRACT.AUTO: CLEAR
COLOR UR AUTO: YELLOW
GLUCOSE UR QL STRIP: NEGATIVE
GRAN CASTS UR QL COMP ASSIST: 1 /LPF
HGB UR QL STRIP: NEGATIVE
HYALINE CASTS UR QL AUTO: 1 /LPF
KETONES UR QL STRIP: NEGATIVE
LEUKOCYTE ESTERASE UR QL STRIP: ABNORMAL
MICROSCOPIC COMMENT: ABNORMAL
NITRITE UR QL STRIP: NEGATIVE
NON-SQ EPI CELLS #/AREA URNS AUTO: <1 /HPF
PH UR STRIP: 5 [PH] (ref 5–8)
PROT UR QL STRIP: ABNORMAL
RBC #/AREA URNS AUTO: 3 /HPF (ref 0–4)
SP GR UR STRIP: 1.01 (ref 1–1.03)
SQUAMOUS #/AREA URNS AUTO: 1 /HPF
URN SPEC COLLECT METH UR: ABNORMAL
WBC #/AREA URNS AUTO: 27 /HPF (ref 0–5)
WBC CLUMPS UR QL AUTO: ABNORMAL

## 2019-01-05 LAB — BACTERIA UR CULT: NO GROWTH

## 2019-01-22 ENCOUNTER — TELEPHONE (OUTPATIENT)
Dept: NEPHROLOGY | Facility: CLINIC | Age: 69
End: 2019-01-22

## 2019-01-22 NOTE — TELEPHONE ENCOUNTER
Spoke with Elisha Crowell NP with Dr. Weathers. Pt admitted to [facility] [place] after a fall at home. Her current Cr is 1.8-2mg/dL. I appreciate Elisha's seeing her and asked if she could arrange Nephrology follow up with labs same day at pt's discharge.

## 2019-01-22 NOTE — TELEPHONE ENCOUNTER
----- Message from Carol Ibarra sent at 1/22/2019 12:15 PM CST -----  Contact: Fern Crowell with [facility]  Calling in regards to  Trying to get base line and was consulted for Elevated  CR and please advise [phone]

## 2019-02-22 ENCOUNTER — OFFICE VISIT (OUTPATIENT)
Dept: NEPHROLOGY | Facility: CLINIC | Age: 69
End: 2019-02-22
Payer: MEDICARE

## 2019-02-22 VITALS
BODY MASS INDEX: 27.46 KG/M2 | DIASTOLIC BLOOD PRESSURE: 58 MMHG | OXYGEN SATURATION: 99 % | HEIGHT: 63 IN | SYSTOLIC BLOOD PRESSURE: 132 MMHG | HEART RATE: 72 BPM

## 2019-02-22 DIAGNOSIS — D63.8 ANEMIA OF CHRONIC DISEASE: ICD-10-CM

## 2019-02-22 DIAGNOSIS — N18.30 CKD (CHRONIC KIDNEY DISEASE) STAGE 3, GFR 30-59 ML/MIN: Primary | ICD-10-CM

## 2019-02-22 DIAGNOSIS — I10 ESSENTIAL HYPERTENSION: ICD-10-CM

## 2019-02-22 PROCEDURE — 99999 PR PBB SHADOW E&M-EST. PATIENT-LVL III: CPT | Mod: PBBFAC,,, | Performed by: INTERNAL MEDICINE

## 2019-02-22 PROCEDURE — 99214 PR OFFICE/OUTPT VISIT, EST, LEVL IV, 30-39 MIN: ICD-10-PCS | Mod: S$PBB,,, | Performed by: INTERNAL MEDICINE

## 2019-02-22 PROCEDURE — 99214 OFFICE O/P EST MOD 30 MIN: CPT | Mod: S$PBB,,, | Performed by: INTERNAL MEDICINE

## 2019-02-22 PROCEDURE — 99213 OFFICE O/P EST LOW 20 MIN: CPT | Mod: PBBFAC,PO | Performed by: INTERNAL MEDICINE

## 2019-02-22 PROCEDURE — 99999 PR PBB SHADOW E&M-EST. PATIENT-LVL III: ICD-10-PCS | Mod: PBBFAC,,, | Performed by: INTERNAL MEDICINE

## 2019-02-22 RX ORDER — HYDROXYZINE PAMOATE 25 MG/1
25 CAPSULE ORAL EVERY 8 HOURS PRN
COMMUNITY
End: 2019-08-15

## 2019-02-22 RX ORDER — ALPRAZOLAM 0.25 MG/1
TABLET ORAL 3 TIMES DAILY
COMMUNITY
End: 2019-08-16 | Stop reason: ALTCHOICE

## 2019-02-22 RX ORDER — DIPHENHYDRAMINE HCL 25 MG
25 TABLET ORAL NIGHTLY PRN
COMMUNITY
Start: 2020-03-31 | End: 2021-05-17 | Stop reason: CLARIF

## 2019-02-22 RX ORDER — HYDROCORTISONE 1 %
CREAM (GRAM) TOPICAL 2 TIMES DAILY
COMMUNITY
End: 2019-08-16 | Stop reason: ALTCHOICE

## 2019-02-22 RX ORDER — TRAZODONE HYDROCHLORIDE 100 MG/1
100 TABLET ORAL
COMMUNITY
End: 2019-02-22

## 2019-02-22 RX ORDER — POLYETHYLENE GLYCOL 3350 17 G/17G
POWDER, FOR SOLUTION ORAL
COMMUNITY
End: 2019-08-16 | Stop reason: ALTCHOICE

## 2019-02-22 RX ORDER — CLONIDINE HYDROCHLORIDE 0.1 MG/1
0.1 TABLET ORAL 2 TIMES DAILY
COMMUNITY
End: 2019-08-16 | Stop reason: ALTCHOICE

## 2019-02-22 RX ORDER — DEXTROMETHORPHAN HYDROBROMIDE, GUAIFENESIN 5; 100 MG/5ML; MG/5ML
650 LIQUID ORAL EVERY 8 HOURS
COMMUNITY
End: 2020-08-24

## 2019-02-22 RX ORDER — DOCUSATE SODIUM 100 MG/1
100 CAPSULE, LIQUID FILLED ORAL 2 TIMES DAILY
COMMUNITY
End: 2019-08-16 | Stop reason: ALTCHOICE

## 2019-02-22 RX ORDER — ENOXAPARIN SODIUM 100 MG/ML
30 INJECTION SUBCUTANEOUS
COMMUNITY
End: 2019-08-16 | Stop reason: ALTCHOICE

## 2019-02-22 RX ORDER — OXYCODONE AND ACETAMINOPHEN 10; 325 MG/1; MG/1
1 TABLET ORAL 3 TIMES DAILY PRN
Refills: 0 | COMMUNITY
Start: 2019-01-22 | End: 2019-02-22

## 2019-02-22 RX ORDER — LORATADINE 10 MG/1
10 TABLET ORAL DAILY
COMMUNITY
End: 2019-08-15

## 2019-02-22 NOTE — PROGRESS NOTES
"Subjective:       Patient ID: Amanda Smith is a 68 y.o. White female who presents for return patient evaluation for chronic renal failure.    She fractured her right foot and has been in [facility] and now [facility].  She is looking forward to going home.  She reports that she has had many medication changes during these hospitalizations and has had a few episodes of urticaria.      Review of Systems   Constitutional: Positive for appetite change and fatigue. Negative for chills and fever.   HENT: Negative for congestion.    Eyes: Negative for visual disturbance.   Respiratory: Positive for shortness of breath. Negative for cough.    Cardiovascular: Negative for chest pain and leg swelling.   Gastrointestinal: Negative for abdominal pain, diarrhea, nausea and vomiting.   Genitourinary: Negative for difficulty urinating, dysuria and hematuria.   Musculoskeletal: Positive for arthralgias, back pain and gait problem.   Skin: Positive for rash. Negative for color change.   Neurological: Negative for headaches.   Hematological: Bruises/bleeds easily.   Psychiatric/Behavioral: Negative for sleep disturbance.       The past medical, family and social histories were reviewed for this encounter.     BP (!) 132/58   Pulse 72   Ht 5' 3" (1.6 m)   SpO2 99%   BMI 27.46 kg/m²     Objective:      Physical Exam   Constitutional: She is oriented to person, place, and time. She appears well-developed and well-nourished. No distress.   HENT:   Head: Normocephalic and atraumatic.   Eyes: Conjunctivae are normal.   Neck: Neck supple. No JVD present.   Cardiovascular: Normal rate, regular rhythm and normal heart sounds. Exam reveals no gallop and no friction rub.   No murmur heard.  Pulmonary/Chest: Effort normal and breath sounds normal. No respiratory distress. She has no wheezes. She has no rales.   Abdominal: Soft. Bowel sounds are normal. She exhibits no distension (obese). There is no tenderness.   Musculoskeletal: She " exhibits no edema.   Neurological: She is alert and oriented to person, place, and time.   Skin: Skin is warm and dry. No rash noted.   Psychiatric: She has a normal mood and affect.   Vitals reviewed.      Assessment:       1. CKD (chronic kidney disease) stage 3, GFR 30-59 ml/min    2. Essential hypertension    3. Anemia of chronic disease        Plan:   Return to clinic in 3 months.  Labs for next visit include rp, upc, pth.  Baseline creatinine is 1.7 since 2012.  She is near her baseline.  PTH is 91 with a calcium of 9.9.  Renal US shows  9.6 cm kidneys bilaterally.  Minimize her drug regimen as much as possible.  Please avoid or minimize all NSAIDS (ibuprofen, motrin, aleve, indocin, naprosyn) to minimize the risk to your kidneys.  Aspirin in a dose of 325 mg or less a day is likely the safest with regards to kidney function.  If you are able to take aspirin and do not have any bleeding problems or ulcers, this may be your best therapy.  Alternatively, acetaminophen (Tylenol) is the safer than NSAIDs with regards to kidney function.  I would ask you take this as directed on the bottle.  It is best to stay under 2 grams a day (4-500 mg tablets a day maximum).

## 2019-03-18 ENCOUNTER — PATIENT OUTREACH (OUTPATIENT)
Dept: ADMINISTRATIVE | Facility: HOSPITAL | Age: 69
End: 2019-03-18

## 2019-03-18 NOTE — PROGRESS NOTES
Health Maintenance Due   Topic Date Due    Mammogram  08/23/1992    DEXA SCAN  08/23/1992    Zoster Vaccine  08/23/2012    Pneumococcal Vaccine (65+ Low/Medium Risk) (1 of 2 - PCV13) 08/23/2017

## 2019-03-19 ENCOUNTER — PATIENT OUTREACH (OUTPATIENT)
Dept: ADMINISTRATIVE | Facility: HOSPITAL | Age: 69
End: 2019-03-19

## 2019-03-26 ENCOUNTER — PATIENT OUTREACH (OUTPATIENT)
Dept: ADMINISTRATIVE | Facility: HOSPITAL | Age: 69
End: 2019-03-26

## 2019-03-26 NOTE — LETTER
March 26, 2019    Amanda L Sarah  [address] [facility] [address]             [facility]  [address]  [facility] [address]  Phone: [phone] Dear Ms. Smith:    We have tried to reach you by [facility] email unsuccessfully.     [facility] is committed to your overall health.  To help you get the most out of each of your visits, we will review your information to make sure you are up to date on all of your recommended tests and/or procedures.       Ulices Gaitan MD   has found that your chart shows you may be due for the following:     Mammogram   Osteoporosis screening (DEXA SCAN)   Shingles Immunization   Pneumonia Immunization     Also, You have been selected for enrollment in a Hypertension Digital Medicine Program byUlices Gaitan MD .  As a participant, you will be able to send home BLOOD PRESSURE readings directly from your smartphone into your medical record at [facility]. Ulices Gaitan MD , along with a team of pharmacists and health coaches, will monitor this data, help you adjust your medication(s), keep you on track with your routine preventative testing, so you can meet your personal health goals. Ulices Gaitan MD will discuss more about the program at your upcoming appointment.     If you have had any of the above done at another facility, please bring the records with you or fax them to [phone] so that your record at [facility] will be complete. If you have not had any of these tests or procedures done recently and would like to complete this testing ,  please call [phone] or send a message through your [facility] portal to your provider's office.     Sincerely,    MD Sylwia Lopez  Clinical Care Coordinator  [facility]

## 2019-03-26 NOTE — PROGRESS NOTES
Health Maintenance Due   Topic Date Due    Mammogram  08/23/1992    DEXA SCAN  08/23/1992    Zoster Vaccine  08/23/2012    Pneumococcal Vaccine (65+ Low/Medium Risk) (1 of 2 - PCV13) 08/23/2017     Portal outreach un-read by patient.  Outreach mailed today

## 2019-04-16 PROBLEM — L50.1 IDIOPATHIC URTICARIA: Status: ACTIVE | Noted: 2019-04-16

## 2019-04-20 DIAGNOSIS — G47.00 INSOMNIA, UNSPECIFIED TYPE: ICD-10-CM

## 2019-04-22 RX ORDER — TRAZODONE HYDROCHLORIDE 100 MG/1
100 TABLET ORAL NIGHTLY
Qty: 30 TABLET | Refills: 5 | Status: SHIPPED | OUTPATIENT
Start: 2019-04-22 | End: 2019-07-28 | Stop reason: SDUPTHER

## 2019-04-30 ENCOUNTER — TELEPHONE (OUTPATIENT)
Dept: FAMILY MEDICINE | Facility: CLINIC | Age: 69
End: 2019-04-30

## 2019-04-30 DIAGNOSIS — R11.2 NON-INTRACTABLE VOMITING WITH NAUSEA, UNSPECIFIED VOMITING TYPE: ICD-10-CM

## 2019-04-30 DIAGNOSIS — E13.319 RETINOPATHY DUE TO SECONDARY DIABETES MELLITUS: Primary | ICD-10-CM

## 2019-04-30 NOTE — TELEPHONE ENCOUNTER
----- Message from Mayo Herrera sent at 4/30/2019  1:37 PM CDT -----  Contact: Kellie/[employer] [facility]  Kellie called in regarding the attached patient.  Kellie stated patient is requesting medication for nausea.  Not vomiting just nauseated.  Kellie stated patient is allergic to Zofran so would need Phenergan.      [facility]/pharmacy #[id number] - [place], [place] - [address] [address]  [address] [place] [address]  Phone: [phone] Fax: [phone]    Kellie's call back number is [phone]

## 2019-05-01 RX ORDER — PROMETHAZINE HYDROCHLORIDE 25 MG/1
25 TABLET ORAL EVERY 6 HOURS PRN
Qty: 30 TABLET | Refills: 0 | Status: SHIPPED | OUTPATIENT
Start: 2019-05-01 | End: 2019-08-16 | Stop reason: ALTCHOICE

## 2019-05-15 ENCOUNTER — TELEPHONE (OUTPATIENT)
Dept: FAMILY MEDICINE | Facility: CLINIC | Age: 69
End: 2019-05-15

## 2019-05-15 RX ORDER — NIFEDIPINE 60 MG/1
60 TABLET, EXTENDED RELEASE ORAL DAILY
Qty: 30 TABLET | Refills: 11 | Status: SHIPPED | OUTPATIENT
Start: 2019-05-15 | End: 2020-01-29

## 2019-05-15 NOTE — TELEPHONE ENCOUNTER
.Called to let patient know that his prescriptions has been . sent to the pharmacy. Verbalized understanding.

## 2019-05-15 NOTE — TELEPHONE ENCOUNTER
Spoke to Mena with home health stated Pt. Was previously in the hospital and was prescribed nifedipine xr 60mg. She is running out and has been taking it every other day to stretch it and is is causing her pressure to rise. so she is asking for a new prescription. Informed mena I would send a message to the doctor. And call him back. Mena verbalized understanding Phone call ended.    mena states BP this morning was 184/90 and she has had a headache.    Asking for Nifedipine XR 60MG please advise

## 2019-05-22 ENCOUNTER — TELEPHONE (OUTPATIENT)
Dept: FAMILY MEDICINE | Facility: CLINIC | Age: 69
End: 2019-05-22

## 2019-05-22 NOTE — TELEPHONE ENCOUNTER
Patient is almost out of her nifedipine. States that she needs a PA on the prescription. Please advise

## 2019-05-24 ENCOUNTER — DOCUMENTATION ONLY (OUTPATIENT)
Dept: FAMILY MEDICINE | Facility: CLINIC | Age: 69
End: 2019-05-24

## 2019-05-24 NOTE — PROGRESS NOTES
PA initiated for Nifedipine ER 60 mg tablets  CMM: [id number]   [id number]  [employer]  ------------------------------------------------------  APPROVED  Valid through 12/31/2019

## 2019-06-20 DIAGNOSIS — I10 ESSENTIAL HYPERTENSION: ICD-10-CM

## 2019-06-20 NOTE — TELEPHONE ENCOUNTER
It said something different and his last prescription is 25 mg twice daily with meals, and this prescriptions a 2 tablets once daily of 12.5 mg, can you please verify with the pharmacy before prescribing this medication.  Thank you

## 2019-07-11 RX ORDER — CARVEDILOL 12.5 MG/1
25 TABLET ORAL DAILY
Qty: 30 TABLET | Refills: 0 | OUTPATIENT
Start: 2019-07-11

## 2019-07-22 RX ORDER — SERTRALINE HYDROCHLORIDE 50 MG/1
TABLET, FILM COATED ORAL
Qty: 30 TABLET | Refills: 5 | Status: SHIPPED | OUTPATIENT
Start: 2019-07-22 | End: 2019-12-04 | Stop reason: SDUPTHER

## 2019-07-28 DIAGNOSIS — G47.00 INSOMNIA, UNSPECIFIED TYPE: ICD-10-CM

## 2019-07-29 RX ORDER — TRAZODONE HYDROCHLORIDE 100 MG/1
100 TABLET ORAL NIGHTLY
Qty: 90 TABLET | Refills: 1 | Status: SHIPPED | OUTPATIENT
Start: 2019-07-29 | End: 2020-01-09 | Stop reason: SDUPTHER

## 2019-08-19 PROBLEM — M54.12 RADICULOPATHY, CERVICAL: Status: ACTIVE | Noted: 2019-08-19

## 2019-09-27 ENCOUNTER — TELEPHONE (OUTPATIENT)
Dept: FAMILY MEDICINE | Facility: CLINIC | Age: 69
End: 2019-09-27

## 2019-09-27 NOTE — TELEPHONE ENCOUNTER
I spoke to Ms. Smith in regards to scheduling her AWV appt. Pt asked if she could call back to schedule it. I gave her my name and direct number for scheduling.

## 2019-10-20 DIAGNOSIS — J30.81 ALLERGY TO CATS: Primary | ICD-10-CM

## 2019-10-21 RX ORDER — LEVOCETIRIZINE DIHYDROCHLORIDE 5 MG/1
TABLET, FILM COATED ORAL
Qty: 90 TABLET | Refills: 0 | Status: SHIPPED | OUTPATIENT
Start: 2019-10-21 | End: 2020-01-09 | Stop reason: SDUPTHER

## 2019-10-21 NOTE — PROGRESS NOTES
Refill Authorization Note     is requesting a refill authorization.    Brief assessment and rationale for refill: ROUTE: op   Name and strength of medication: LEVOCETIRIZINE 5 MG TABLET  Medication-related problems identified: Requires appointment    Medication Therapy Plan: NCO by you recently; LOV(10/18); needs appt(ANNUAL); med outside of protocol; route to you                    How patient will take medication: t1t po qpm           Comments:   Appointments past 12m or future 3m    Date Provider   Last Visit   10/1/2018 Ulices Gaitan MD   Next Visit   Visit date not found Ulices Gaitan MD     Last Prescribed Info:             Discontinue Date:  2/22/2019 15:03 Discontinue User:  Shamika Goins LPN Discontinue Reason:  Alternate therapy   Written Date: 11/14/18 Expiration Date: 11/14/19     Start Date: 11/14/18 End Date: 02/22/19 (ordered for 365 doses)            Ordering Provider: -- YAMILETH #:  -- NPI:  --    Authorizing Provider: Ulices Gaitan MD YAMILETH #:  [id number] NPI:  [id number]    Ordering User:  Ulices Gaitan MD               Pharmacy:  [facility]/pharmacy #[id number] [address] [address] YAMILETH #:  [id number]     Pharmacy Comments:  --          Fill quantity remaining:  -- Fill quantity used:  -- Next fill due: --       Outpatient Medication Detail      Disp Refills Start End    levocetirizine (XYZAL) 5 MG tablet (Discontinued) 30 tablet 11 11/14/2018 2/22/2019    Sig - Route: Take 1 tablet (5 mg total) by mouth every evening. - Oral    Sent to pharmacy as: levocetirizine (XYZAL) 5 MG tablet    Reason for Discontinue: Alternate therapy    E-Prescribing Status: Receipt

## 2019-10-21 NOTE — PROGRESS NOTES
Refill Authorization Note     is requesting a refill authorization.    Brief assessment and rationale for refill: DEFER: last commented as no longer taking  Name and strength of medication: LEVOCETIRIZINE 5 MG TABLET  Medication-related problems identified: Requires appointment    Medication Therapy Plan: Levocetirizine not commented on recently; Needs appt (ANNUAL); Discontinued 2/19; Patient has been filling levocetirizine regularly per [employer]; defer to you    Medication reconciliation completed: Yes              How patient will take medication: t1t po qpm           Comments: Revised Refill Auth Note.

## 2019-10-21 NOTE — TELEPHONE ENCOUNTER
Please see the following assessment. This refill request still requires some action on your part. Levocetrizine use has not been commented on recently. Also, patient is due for annual. Pending 90-day supply. Defer to you. Will follow up on scheduling appointment after your decision.  Thank you.

## 2019-10-30 NOTE — TELEPHONE ENCOUNTER
----- Message from Marybeth Cartwright sent at 10/30/2019 12:43 PM CDT -----  Contact: Corrina kohli/ [facility]  Type: Needs Medical Advice    Who Called: Corrina kohli/ [facility]  Symptoms (please be specific):  Nausea / vomiting/ dry lips/ signs of dehydration  How long has patient had these symptoms: started last night  Pharmacy name and phone #:    [facility]  [address]  [place] Call Back Number: Corrina at  [phone]  Additional Information: Calling to request a refill for promethazine tablet 25 mg, Every 6 hours PRN for Nausea/Vomiting (1st choice) - use as first treatment. She wants to use the Pharmacy listed above. Please advise

## 2019-10-31 RX ORDER — PROMETHAZINE HYDROCHLORIDE 25 MG/1
25 TABLET ORAL EVERY 6 HOURS PRN
Qty: 20 TABLET | Refills: 0 | Status: SHIPPED | OUTPATIENT
Start: 2019-10-31 | End: 2020-01-16 | Stop reason: SDUPTHER

## 2019-11-11 ENCOUNTER — TELEPHONE (OUTPATIENT)
Dept: NEPHROLOGY | Facility: CLINIC | Age: 69
End: 2019-11-11

## 2019-11-11 NOTE — TELEPHONE ENCOUNTER
----- Message from Tavia Lezama sent at 11/11/2019 11:54 AM CST -----  Type: Needs Medical Advice    Who Called:  patient  Symptoms (please be specific):  Large cyst in Kidney (patient does not know which kidney)  How long has patient had these symptoms:  na  Pharmacy name and phone #:  [facility]  Best Call Back Number: [phone]  Additional Information: patient was in [facility] about 10 days ago (admitted) and a large cyst was found in a kidney/she is advised to followup with her nephrologist soon/please advise as I do not show any appts soon

## 2019-11-11 NOTE — TELEPHONE ENCOUNTER
I told patient I will ask Dr Valente to review her [facility] information and we will call her tomorrow with his directive.

## 2019-11-11 NOTE — TELEPHONE ENCOUNTER
I do not have access to LV    She has CKD and a history of cysts on past scans.    Renal function is stable since 2013, 1.5-2.0.    I do not see her recent labs however.

## 2019-11-12 NOTE — TELEPHONE ENCOUNTER
LV labs  10/14/19    Scr 2.58  K 4.2    I would ask that you get her in to see Dr. MASCORRO when she is back at whatever interval she prefers

## 2019-11-20 NOTE — TELEPHONE ENCOUNTER
They are celebrating Thanksgiving this Friday and requested next week.  Requested schedule for 11am on Tuesday 11.26

## 2019-11-20 NOTE — TELEPHONE ENCOUNTER
Please schedule pt to see me this Friday with imaging results of large cyst' from "[employer]. Thank you

## 2019-11-25 ENCOUNTER — TELEPHONE (OUTPATIENT)
Dept: FAMILY MEDICINE | Facility: CLINIC | Age: 69
End: 2019-11-25

## 2019-11-25 NOTE — TELEPHONE ENCOUNTER
----- Message from Melissa Shearer sent at 11/25/2019  4:15 PM CST -----  Contact: patient  Type: Needs Medical Advice    Who Called:  patient  Best Call Back Number: [phone]  Additional Information: patient states that she found a lump in her breast over the weekend. She is requesting orders for a mammo. Please give call back

## 2019-11-27 ENCOUNTER — HOSPITAL ENCOUNTER (OUTPATIENT)
Dept: RADIOLOGY | Facility: HOSPITAL | Age: 69
Discharge: HOME | End: 2019-11-27
Attending: INTERNAL MEDICINE
Payer: MEDICARE

## 2019-11-27 ENCOUNTER — OFFICE VISIT (OUTPATIENT)
Dept: NEPHROLOGY | Facility: CLINIC | Age: 69
End: 2019-11-27
Payer: MEDICARE

## 2019-11-27 VITALS
OXYGEN SATURATION: 97 % | SYSTOLIC BLOOD PRESSURE: 148 MMHG | DIASTOLIC BLOOD PRESSURE: 70 MMHG | HEART RATE: 60 BPM | WEIGHT: 175.5 LBS | BODY MASS INDEX: 32.1 KG/M2

## 2019-11-27 DIAGNOSIS — M79.7 FIBROMYALGIA: ICD-10-CM

## 2019-11-27 DIAGNOSIS — D50.8 OTHER IRON DEFICIENCY ANEMIA: ICD-10-CM

## 2019-11-27 DIAGNOSIS — E66.9 OBESITY (BMI 30.0-34.9): ICD-10-CM

## 2019-11-27 DIAGNOSIS — Z12.31 ENCOUNTER FOR SCREENING MAMMOGRAM FOR BREAST CANCER: ICD-10-CM

## 2019-11-27 DIAGNOSIS — I10 ESSENTIAL HYPERTENSION: ICD-10-CM

## 2019-11-27 DIAGNOSIS — I44.7 LBBB (LEFT BUNDLE BRANCH BLOCK): ICD-10-CM

## 2019-11-27 DIAGNOSIS — N28.1 COMPLEX RENAL CYST: Primary | ICD-10-CM

## 2019-11-27 DIAGNOSIS — D63.8 ANEMIA OF CHRONIC DISEASE: ICD-10-CM

## 2019-11-27 DIAGNOSIS — N18.30 CKD (CHRONIC KIDNEY DISEASE) STAGE 3, GFR 30-59 ML/MIN: ICD-10-CM

## 2019-11-27 PROCEDURE — 1101F PR PT FALLS ASSESS DOC 0-1 FALLS W/OUT INJ PAST YR: ICD-10-PCS | Mod: HCNC,CPTII,S$GLB, | Performed by: INTERNAL MEDICINE

## 2019-11-27 PROCEDURE — 3077F SYST BP >= 140 MM HG: CPT | Mod: HCNC,CPTII,S$GLB, | Performed by: INTERNAL MEDICINE

## 2019-11-27 PROCEDURE — 1159F PR MEDICATION LIST DOCUMENTED IN MEDICAL RECORD: ICD-10-PCS | Mod: HCNC,S$GLB,, | Performed by: INTERNAL MEDICINE

## 2019-11-27 PROCEDURE — 3077F PR MOST RECENT SYSTOLIC BLOOD PRESSURE >= 140 MM HG: ICD-10-PCS | Mod: HCNC,CPTII,S$GLB, | Performed by: INTERNAL MEDICINE

## 2019-11-27 PROCEDURE — 99213 OFFICE O/P EST LOW 20 MIN: CPT | Mod: HCNC,S$GLB,, | Performed by: INTERNAL MEDICINE

## 2019-11-27 PROCEDURE — 1125F AMNT PAIN NOTED PAIN PRSNT: CPT | Mod: HCNC,S$GLB,, | Performed by: INTERNAL MEDICINE

## 2019-11-27 PROCEDURE — 1125F PR PAIN SEVERITY QUANTIFIED, PAIN PRESENT: ICD-10-PCS | Mod: HCNC,S$GLB,, | Performed by: INTERNAL MEDICINE

## 2019-11-27 PROCEDURE — 3078F PR MOST RECENT DIASTOLIC BLOOD PRESSURE < 80 MM HG: ICD-10-PCS | Mod: HCNC,CPTII,S$GLB, | Performed by: INTERNAL MEDICINE

## 2019-11-27 PROCEDURE — 3078F DIAST BP <80 MM HG: CPT | Mod: HCNC,CPTII,S$GLB, | Performed by: INTERNAL MEDICINE

## 2019-11-27 PROCEDURE — 99213 PR OFFICE/OUTPT VISIT, EST, LEVL III, 20-29 MIN: ICD-10-PCS | Mod: HCNC,S$GLB,, | Performed by: INTERNAL MEDICINE

## 2019-11-27 PROCEDURE — 99999 PR PBB SHADOW E&M-EST. PATIENT-LVL IV: CPT | Mod: PBBFAC,HCNC,, | Performed by: INTERNAL MEDICINE

## 2019-11-27 PROCEDURE — 1101F PT FALLS ASSESS-DOCD LE1/YR: CPT | Mod: HCNC,CPTII,S$GLB, | Performed by: INTERNAL MEDICINE

## 2019-11-27 PROCEDURE — 99999 PR PBB SHADOW E&M-EST. PATIENT-LVL IV: ICD-10-PCS | Mod: PBBFAC,HCNC,, | Performed by: INTERNAL MEDICINE

## 2019-11-27 PROCEDURE — 1159F MED LIST DOCD IN RCRD: CPT | Mod: HCNC,S$GLB,, | Performed by: INTERNAL MEDICINE

## 2019-11-27 RX ORDER — MONTELUKAST SODIUM 10 MG/1
10 TABLET ORAL NIGHTLY PRN
Refills: 6 | COMMUNITY
Start: 2019-10-22

## 2019-12-03 ENCOUNTER — TELEPHONE (OUTPATIENT)
Dept: RADIOLOGY | Facility: HOSPITAL | Age: 69
End: 2019-12-03

## 2019-12-03 NOTE — TELEPHONE ENCOUNTER
Spoke to patient regarding scheduling diagnostic mammogram.  She states she found a left breast lump.   Screening mammogram was scheduled in error.   She does not want to schedule mammogram at this time.   I give her my direct line to schedule mammogram when she is available.

## 2019-12-04 ENCOUNTER — TELEPHONE (OUTPATIENT)
Dept: FAMILY MEDICINE | Facility: CLINIC | Age: 69
End: 2019-12-04

## 2019-12-04 RX ORDER — SERTRALINE HYDROCHLORIDE 50 MG/1
50 TABLET, FILM COATED ORAL DAILY
Qty: 30 TABLET | Refills: 1 | Status: SHIPPED | OUTPATIENT
Start: 2019-12-04 | End: 2020-01-16 | Stop reason: SDUPTHER

## 2019-12-04 RX ORDER — SERTRALINE HYDROCHLORIDE 50 MG/1
50 TABLET, FILM COATED ORAL DAILY
Qty: 30 TABLET | Refills: 5 | Status: CANCELLED | OUTPATIENT
Start: 2019-12-04

## 2019-12-04 NOTE — TELEPHONE ENCOUNTER
Called and spoke with patient, appointment scheduled. Would like to know if she can have a refill on her Zoloft until her appointment in January. Order pended with correct pharmacy. Please advise

## 2019-12-04 NOTE — TELEPHONE ENCOUNTER
Needs appointment with me in my next available regular appointment time slot (do not use any hold spots).

## 2019-12-08 PROBLEM — N28.1 COMPLEX RENAL CYST: Status: ACTIVE | Noted: 2019-12-08

## 2019-12-09 NOTE — PROGRESS NOTES
Subjective:       Patient ID: Amanda Smith is a 69 y.o. White female who presents for return patient evaluation for chronic renal failure.    Dr. Valente 2/2019: She fractured her right foot and has been in [facility] and now [facility].  She is looking forward to going home.  She reports that she has had many medication changes during these hospitalizations and has had a few episodes of urticaria.    Interval history 11/2019: she was hospitalized at [facility] and underwent a CT scan which revealed a cyst on her kidney and she was advised to follow up with Nephrology       Review of Systems   Cardiovascular: Negative for leg swelling.   Genitourinary: Positive for flank pain. Negative for difficulty urinating, dysuria and hematuria.   Musculoskeletal: Positive for back pain.       The past medical, family and social histories were reviewed for this encounter.     BP (!) 148/70   Pulse 60   Wt 79.6 kg (175 lb 7.8 oz)   SpO2 97%   BMI 32.10 kg/m²     Objective:      Physical Exam   Constitutional: She is oriented to person, place, and time. She appears well-nourished. No distress.   HENT:   Mouth/Throat: Oropharynx is clear and moist.   Neck: No JVD present.   Cardiovascular: S1 normal and S2 normal. Exam reveals no friction rub.   Pulmonary/Chest: Breath sounds normal. She has no wheezes. She has no rales.   Abdominal: Soft.   Musculoskeletal: She exhibits no edema.   Neurological: She is alert and oriented to person, place, and time.   Skin: Skin is warm and dry.   Psychiatric: She has a normal mood and affect.   Nursing note and vitals reviewed.      Assessment:       1. Complex renal cyst    2. CKD (chronic kidney disease) stage 3, GFR 30-59 ml/min    3. Other iron deficiency anemia    4. Essential hypertension    5. Anemia of chronic disease    6. Obesity (BMI 30.0-34.9)    7. LBBB (left bundle branch block)    8. Fibromyalgia        Plan:     Complex renal cyst  - check renal u/s in 3 months    CKD  Stage 3/4  - due for labs    HTN  - fair control, repeat 139/89

## 2020-01-09 DIAGNOSIS — G47.00 INSOMNIA, UNSPECIFIED TYPE: ICD-10-CM

## 2020-01-09 DIAGNOSIS — J30.81 ALLERGY TO CATS: ICD-10-CM

## 2020-01-09 NOTE — TELEPHONE ENCOUNTER
----- Message from Ralph Morris sent at 1/9/2020  3:05 PM CST -----  Contact: Janessa with [facility]  Type:  RX Refill Request    Who Called:  Janessa  Refill or New Rx:  refill  RX Name and Strength:  traZODone (DESYREL) 100 MG tablet and  levocetirizine (XYZAL) 5 MG tablet   How is the patient currently taking it? (ex. 1XDay):    Is this a 30 day or 90 day RX:  90  Preferred Pharmacy with phone number:    [id number] [address] AT [facility] & [address] [address]  Phone: [phone] Fax: [phone]    Local or Mail Order:    Ordering Provider:    Best Call Back Number:  [phone]  Additional Information:

## 2020-01-10 RX ORDER — LEVOCETIRIZINE DIHYDROCHLORIDE 5 MG/1
5 TABLET, FILM COATED ORAL NIGHTLY
Qty: 90 TABLET | Refills: 0 | Status: SHIPPED | OUTPATIENT
Start: 2020-01-10 | End: 2020-01-16

## 2020-01-10 RX ORDER — TRAZODONE HYDROCHLORIDE 100 MG/1
100 TABLET ORAL NIGHTLY
Qty: 90 TABLET | Refills: 0 | Status: SHIPPED | OUTPATIENT
Start: 2020-01-10 | End: 2020-01-16 | Stop reason: SDUPTHER

## 2020-01-10 NOTE — TELEPHONE ENCOUNTER
I have reviewed the assessment below. Pt taking levocetirizine for allergy to cats. Approve 3 more.

## 2020-01-10 NOTE — PROGRESS NOTES
Refill Authorization Note     is requesting a refill authorization.    Brief assessment and rationale for refill: REVIEW: protocol fail  Name and strength of medication: levocetirizine (XYZAL) 5 MG tablet // traZODone (DESYREL) 100 MG tablet  Medication-related problems identified: Therapeutic duplication    Medication Therapy Plan: Problem list - Allergy to cats; FOVS;     Medication reconciliation completed: No   Pharmacist Review Requested: Yes                     Comments: Therapeutic Duplications Addressed in this encounter     Requested Prescriptions   Pending Prescriptions Disp Refills    levocetirizine (XYZAL) 5 MG tablet 90 tablet 0     Sig: Take 1 tablet (5 mg total) by mouth every evening.       Ear, Nose, and Throat:  Antihistamines Failed - 1/10/2020 12:34 PM        Failed - An appropriate indication is on the problem list     Allergic Rhinitis  Sinusitis  Seasonal Allergies              Passed - Patient is at least 18 years old        Passed - Office visit in past 12 months or future 90 days     Recent Outpatient Visits            1 month ago Complex renal cyst    [facility] Nephrology Ananda King MD    10 months ago CKD (chronic kidney disease) stage 3, GFR 30-59 ml/min    [facility] Nephrology Shailesh Valente MD    1 year ago Essential hypertension    [facility] Medicine Ulices Gaitan MD    1 year ago CKD (chronic kidney disease) stage 3, GFR 30-59 ml/min    [place] - Nephrology Ananda King MD    1 year ago Essential hypertension    [place] - Cardiology Wesley Byers MD          Future Appointments              In 6 days Ulices Gaitan MD [facility]    In 1 month URINE [facility] [place]    In 1 month LAB, [place] [facility] [place]    In 1 month [facility] [place]    In 2 months [facility]                traZODone (DESYREL) 100 MG tablet 90 tablet 0     Sig: Take 1 tablet (100 mg total) by mouth every evening.       Psychiatry: Antidepressants - Serotonin Modulator Passed - 1/9/2020  3:19 PM        Passed - Patient is at least 18 years old        Passed - Office visit in past 6 months or future 90 days     Recent Outpatient Visits            1 month ago Complex renal cyst    [facility] Nephrology Ananda King MD    10 months ago CKD (chronic kidney disease) stage 3, GFR 30-59 ml/min    [facility] Nephrology Shailesh Valente MD    1 year ago Essential hypertension    [facility] Family Medicine Ulices Gaitan MD    1 year ago CKD (chronic kidney disease) stage 3, GFR 30-59 ml/min    [facility] Nephrology Ananda King MD    1 year ago Essential hypertension    [place] - Cardiology Wesley Byers MD          Future Appointments              In 6 days Ulices Gaitan MD [facility]    In 1 month URINE [facility] [place]    In 1 month LAB, [place] [facility] [place]    In 1 month [facility] US1 [facility]    In 2 months [facility]                Appointments  past 12m or future 3m with PCP    Date Provider   Last Visit   10/1/2018 Ulices Gaitan MD   Next Visit   1/16/2020 Ulices Gaitan MD

## 2020-01-16 ENCOUNTER — OFFICE VISIT (OUTPATIENT)
Dept: FAMILY MEDICINE | Facility: CLINIC | Age: 70
End: 2020-01-16
Payer: MEDICARE

## 2020-01-16 VITALS
OXYGEN SATURATION: 98 % | HEIGHT: 62 IN | SYSTOLIC BLOOD PRESSURE: 144 MMHG | BODY MASS INDEX: 33.39 KG/M2 | WEIGHT: 181.44 LBS | DIASTOLIC BLOOD PRESSURE: 68 MMHG | HEART RATE: 97 BPM

## 2020-01-16 DIAGNOSIS — G89.29 CHRONIC LOW BACK PAIN, UNSPECIFIED BACK PAIN LATERALITY, UNSPECIFIED WHETHER SCIATICA PRESENT: ICD-10-CM

## 2020-01-16 DIAGNOSIS — R11.0 NAUSEA: ICD-10-CM

## 2020-01-16 DIAGNOSIS — M54.50 CHRONIC LOW BACK PAIN, UNSPECIFIED BACK PAIN LATERALITY, UNSPECIFIED WHETHER SCIATICA PRESENT: ICD-10-CM

## 2020-01-16 DIAGNOSIS — I50.9 CONGESTIVE HEART FAILURE, UNSPECIFIED HF CHRONICITY, UNSPECIFIED HEART FAILURE TYPE: ICD-10-CM

## 2020-01-16 DIAGNOSIS — I10 ESSENTIAL HYPERTENSION: ICD-10-CM

## 2020-01-16 DIAGNOSIS — G47.00 INSOMNIA, UNSPECIFIED TYPE: ICD-10-CM

## 2020-01-16 DIAGNOSIS — E78.5 DYSLIPIDEMIA: Primary | ICD-10-CM

## 2020-01-16 DIAGNOSIS — J44.9 CHRONIC OBSTRUCTIVE PULMONARY DISEASE, UNSPECIFIED COPD TYPE: ICD-10-CM

## 2020-01-16 DIAGNOSIS — F32.A DEPRESSION, UNSPECIFIED DEPRESSION TYPE: ICD-10-CM

## 2020-01-16 DIAGNOSIS — N18.4 CKD (CHRONIC KIDNEY DISEASE), STAGE IV: ICD-10-CM

## 2020-01-16 PROCEDURE — 99499 RISK ADDL DX/OHS AUDIT: ICD-10-PCS | Mod: HCNC,S$GLB,, | Performed by: INTERNAL MEDICINE

## 2020-01-16 PROCEDURE — 99999 PR PBB SHADOW E&M-EST. PATIENT-LVL III: ICD-10-PCS | Mod: PBBFAC,HCNC,, | Performed by: INTERNAL MEDICINE

## 2020-01-16 PROCEDURE — 3078F PR MOST RECENT DIASTOLIC BLOOD PRESSURE < 80 MM HG: ICD-10-PCS | Mod: HCNC,CPTII,S$GLB, | Performed by: INTERNAL MEDICINE

## 2020-01-16 PROCEDURE — 3288F PR FALLS RISK ASSESSMENT DOCUMENTED: ICD-10-PCS | Mod: HCNC,CPTII,S$GLB, | Performed by: INTERNAL MEDICINE

## 2020-01-16 PROCEDURE — 3077F SYST BP >= 140 MM HG: CPT | Mod: HCNC,CPTII,S$GLB, | Performed by: INTERNAL MEDICINE

## 2020-01-16 PROCEDURE — 1100F PR PT FALLS ASSESS DOC 2+ FALLS/FALL W/INJURY/YR: ICD-10-PCS | Mod: HCNC,CPTII,S$GLB, | Performed by: INTERNAL MEDICINE

## 2020-01-16 PROCEDURE — 3288F FALL RISK ASSESSMENT DOCD: CPT | Mod: HCNC,CPTII,S$GLB, | Performed by: INTERNAL MEDICINE

## 2020-01-16 PROCEDURE — 3078F DIAST BP <80 MM HG: CPT | Mod: HCNC,CPTII,S$GLB, | Performed by: INTERNAL MEDICINE

## 2020-01-16 PROCEDURE — 1100F PTFALLS ASSESS-DOCD GE2>/YR: CPT | Mod: HCNC,CPTII,S$GLB, | Performed by: INTERNAL MEDICINE

## 2020-01-16 PROCEDURE — 99999 PR PBB SHADOW E&M-EST. PATIENT-LVL III: CPT | Mod: PBBFAC,HCNC,, | Performed by: INTERNAL MEDICINE

## 2020-01-16 PROCEDURE — 3077F PR MOST RECENT SYSTOLIC BLOOD PRESSURE >= 140 MM HG: ICD-10-PCS | Mod: HCNC,CPTII,S$GLB, | Performed by: INTERNAL MEDICINE

## 2020-01-16 PROCEDURE — 1159F PR MEDICATION LIST DOCUMENTED IN MEDICAL RECORD: ICD-10-PCS | Mod: HCNC,S$GLB,, | Performed by: INTERNAL MEDICINE

## 2020-01-16 PROCEDURE — 1159F MED LIST DOCD IN RCRD: CPT | Mod: HCNC,S$GLB,, | Performed by: INTERNAL MEDICINE

## 2020-01-16 PROCEDURE — 99215 PR OFFICE/OUTPT VISIT, EST, LEVL V, 40-54 MIN: ICD-10-PCS | Mod: HCNC,S$GLB,, | Performed by: INTERNAL MEDICINE

## 2020-01-16 PROCEDURE — 99499 UNLISTED E&M SERVICE: CPT | Mod: HCNC,S$GLB,, | Performed by: INTERNAL MEDICINE

## 2020-01-16 PROCEDURE — 99215 OFFICE O/P EST HI 40 MIN: CPT | Mod: HCNC,S$GLB,, | Performed by: INTERNAL MEDICINE

## 2020-01-16 RX ORDER — HYDROCHLOROTHIAZIDE 12.5 MG/1
12.5 TABLET ORAL DAILY
COMMUNITY
End: 2020-01-16

## 2020-01-16 RX ORDER — TORSEMIDE 20 MG/1
20 TABLET ORAL DAILY
Status: ON HOLD | COMMUNITY
Start: 2019-12-23 | End: 2021-03-29 | Stop reason: HOSPADM

## 2020-01-16 RX ORDER — PROMETHAZINE HYDROCHLORIDE 25 MG/1
25 TABLET ORAL EVERY 6 HOURS PRN
Qty: 30 TABLET | Refills: 0 | Status: SHIPPED | OUTPATIENT
Start: 2020-01-16 | End: 2020-03-10 | Stop reason: SDUPTHER

## 2020-01-16 RX ORDER — ATORVASTATIN CALCIUM 20 MG/1
TABLET, FILM COATED ORAL
COMMUNITY
Start: 2019-12-21 | End: 2020-01-16 | Stop reason: SDUPTHER

## 2020-01-16 RX ORDER — ATORVASTATIN CALCIUM 20 MG/1
20 TABLET, FILM COATED ORAL DAILY
Qty: 90 TABLET | Refills: 3 | Status: SHIPPED | OUTPATIENT
Start: 2020-01-16

## 2020-01-16 RX ORDER — SERTRALINE HYDROCHLORIDE 50 MG/1
50 TABLET, FILM COATED ORAL DAILY
Qty: 30 TABLET | Refills: 1 | Status: SHIPPED | OUTPATIENT
Start: 2020-01-16 | End: 2020-02-14

## 2020-01-16 RX ORDER — CARVEDILOL 12.5 MG/1
TABLET ORAL DAILY
COMMUNITY
Start: 2019-12-23 | End: 2020-03-10 | Stop reason: SDUPTHER

## 2020-01-16 RX ORDER — TRAZODONE HYDROCHLORIDE 100 MG/1
100 TABLET ORAL NIGHTLY
Qty: 90 TABLET | Refills: 0 | Status: SHIPPED | OUTPATIENT
Start: 2020-01-16 | End: 2020-01-23

## 2020-01-16 RX ORDER — SPIRONOLACTONE 25 MG/1
TABLET ORAL
COMMUNITY
Start: 2019-12-21 | End: 2020-01-16

## 2020-01-16 NOTE — PROGRESS NOTES
Subjective:       Patient ID: Amanda Smith is a 69 y.o. female.    Chief Complaint: Annual Exam    Here for routine health maintenance.      Patient non complaint with Dr visits and meds in past.  Not seen since 10/2018, but states was in hospital and rehabs almost constantly through last year:     Patient received pacemaker after syncope episode.    Broke leg when fell.  Developed recurrent hives 2-3 days s/p pacemaker.  Went into anaphylactic shock and had to be intubated.  Occurred 5 times.   Now gets immunotherapy once a month to prevent recurrence because etiology was not found.  Zolar?-   Dr Piotr Tellez.    August had cervical neck fusion.  Helped but did not cure her pain.  Dr Marcos Ryan.     CHF - new dx 12/2019 with admit.  Was since admitted for dehydration from over diureses. No sob.  Dr Vincent Rooney     HTN - uncontrolled. meds were not explained or org well on DC.  Patient took 5 BP message and passing out from BP 64/40.      CKD IV- GFR 25;  Seeing renal in March.     Depression - controlled  Insomnia controlled.   Chronic back pain - takes oxycodone about 2-3/ wk; phenergan as pain can cause nausea.  Dr Ryan  COPD - no SOB      Review of Systems   Constitutional: Negative for appetite change and fever.   HENT: Negative for nosebleeds and trouble swallowing.    Eyes: Negative for discharge and visual disturbance.   Respiratory: Negative for choking and shortness of breath.    Cardiovascular: Negative for chest pain and palpitations.   Gastrointestinal: Negative for abdominal pain, nausea and vomiting.   Musculoskeletal: Positive for back pain. Negative for arthralgias and joint swelling.   Skin: Negative for rash and wound.   Neurological: Negative for dizziness and syncope.   Psychiatric/Behavioral: Negative for confusion and dysphoric mood.       Objective:      Vitals:    01/16/20 1007   BP: (!) 154/72   Pulse: 97     Physical Exam   Constitutional: She appears well-nourished.   Eyes:  Conjunctivae and EOM are normal.   Neck: Normal range of motion.   Cardiovascular: Normal rate and regular rhythm.   No edema   Pulmonary/Chest: Effort normal and breath sounds normal.   Musculoskeletal:   Normal ROM bilateral    Neurological: No cranial nerve deficit (grossly intact).   Skin: Skin is warm and dry.   Psychiatric: She has a normal mood and affect.   Alert and orientated   Vitals reviewed.        Assessment:       1. Dyslipidemia    2. Insomnia, unspecified type    3. CKD (chronic kidney disease), stage IV    4. Essential hypertension    5. Congestive heart failure, unspecified HF chronicity, unspecified heart failure type    6. Chronic obstructive pulmonary disease, unspecified COPD type    7. Depression, unspecified depression type    8. Chronic low back pain, unspecified back pain laterality, unspecified whether sciatica present    9. Nausea        Plan:       Dyslipidemia  -     atorvastatin (LIPITOR) 20 MG tablet; Take 1 tablet (20 mg total) by mouth once daily.  Dispense: 90 tablet; Refill: 3  -     Lipid panel; Future; Expected date: 01/16/2020    Insomnia, unspecified type  -     traZODone (DESYREL) 100 MG tablet; Take 1 tablet (100 mg total) by mouth every evening.  Dispense: 90 tablet; Refill: 0    CKD (chronic kidney disease), stage IV  -     Comprehensive metabolic panel; Future; Expected date: 01/16/2020  -     CBC auto differential; Future; Expected date: 01/16/2020    Essential hypertension    Congestive heart failure, unspecified HF chronicity, unspecified heart failure type    Chronic obstructive pulmonary disease, unspecified COPD type    Depression, unspecified depression type  -     sertraline (ZOLOFT) 50 MG tablet; Take 1 tablet (50 mg total) by mouth once daily.  Dispense: 30 tablet; Refill: 1    Chronic low back pain, unspecified back pain laterality, unspecified whether sciatica present  -     promethazine (PHENERGAN) 25 MG tablet; Take 1 tablet (25 mg total) by mouth every 6  (six) hours as needed for Nausea.  Dispense: 30 tablet; Refill: 0    Nausea  -     promethazine (PHENERGAN) 25 MG tablet; Take 1 tablet (25 mg total) by mouth every 6 (six) hours as needed for Nausea.  Dispense: 30 tablet; Refill: 0            Medication List with Changes/Refills   Current Medications    ACETAMINOPHEN (TYLENOL) 650 MG TBSR    Take 650 mg by mouth every 8 (eight) hours.    BISACODYL ORAL    Take 10 mg by mouth.    CARVEDILOL (COREG) 12.5 MG TABLET        DIPHENHYDRAMINE (BENADRYL ALLERGY) 25 MG TABLET    Take 25 mg by mouth nightly as needed for Insomnia.    MONTELUKAST (SINGULAIR) 10 MG TABLET    Take 10 mg by mouth once daily.    NIFEDIPINE (PROCARDIA-XL) 60 MG (OSM) 24 HR TABLET    Take 1 tablet (60 mg total) by mouth once daily.    OXYCODONE-ACETAMINOPHEN (PERCOCET)  MG PER TABLET    Take 1 tablet by mouth every 4 (four) hours as needed for Pain.    TIZANIDINE (ZANAFLEX) 4 MG TABLET    Take 4 mg by mouth every 6 (six) hours as needed.    TORSEMIDE (DEMADEX) 20 MG TAB       Changed and/or Refilled Medications    Modified Medication Previous Medication    ATORVASTATIN (LIPITOR) 20 MG TABLET atorvastatin (LIPITOR) 20 MG tablet       Take 1 tablet (20 mg total) by mouth once daily.        PROMETHAZINE (PHENERGAN) 25 MG TABLET promethazine (PHENERGAN) 25 MG tablet       Take 1 tablet (25 mg total) by mouth every 6 (six) hours as needed for Nausea.    Take 1 tablet (25 mg total) by mouth every 6 (six) hours as needed for Nausea.    SERTRALINE (ZOLOFT) 50 MG TABLET sertraline (ZOLOFT) 50 MG tablet       Take 1 tablet (50 mg total) by mouth once daily.    Take 1 tablet (50 mg total) by mouth once daily.    TRAZODONE (DESYREL) 100 MG TABLET traZODone (DESYREL) 100 MG tablet       Take 1 tablet (100 mg total) by mouth every evening.    Take 1 tablet (100 mg total) by mouth every evening.   Discontinued Medications    HYDROCHLOROTHIAZIDE (HYDRODIURIL) 12.5 MG TAB    Take 12.5 mg by mouth once daily.     LEVOCETIRIZINE (XYZAL) 5 MG TABLET    Take 1 tablet (5 mg total) by mouth every evening.    SPIRONOLACTONE (ALDACTONE) 25 MG TABLET         Dec nefidipine to 60 mg xr.  Take this and carvidalol and torsemide.  DC spironolactone for now.  BP log and rtc 2 wk w labs   Continue current management and monitor.    Counseled on regular exercise, maintenance of a healthy weight, balanced diet rich in fruits/vegetables and lean protein, and avoidance of unhealthy habits like smoking and excessive alcohol intake.   Also, counseled on importance of being compliant with medication, health appointments, diet and exercise.     Follow up in about 2 weeks (around 1/30/2020). annual, handicap    Total time spent with patient was more than 45 minutes with more than half the time spent in direct consultation with the patient in regards to our treatment/plan.

## 2020-01-19 DIAGNOSIS — J30.81 ALLERGY TO CATS: ICD-10-CM

## 2020-01-21 RX ORDER — LEVOCETIRIZINE DIHYDROCHLORIDE 5 MG/1
TABLET, FILM COATED ORAL
Qty: 90 TABLET | Refills: 0 | OUTPATIENT
Start: 2020-01-21

## 2020-01-21 NOTE — TELEPHONE ENCOUNTER
I have reviewed the assessment below. Levocetirizine d/c by PCP at [facility](01/16/20). Quick dc request.

## 2020-01-21 NOTE — PROGRESS NOTES
Refill Authorization Note     is requesting a refill authorization.    Brief assessment and rationale for refill: REVIEW: last commented as no longer taking          Medication Therapy Plan: [facility]    Medication reconciliation completed: Yes                         Comments:   Requested Prescriptions   Pending Prescriptions Disp Refills    levocetirizine (XYZAL) 5 MG tablet [Pharmacy Med Name: LEVOCETIRIZINE 5 MG TABLET] 90 tablet 0     Sig: TAKE 1 TABLET BY MOUTH EVERY DAY IN THE EVENING       Non-sedating Antihistamines Protocol Failed - 1/19/2020  1:22 AM        Failed - Active on medication list        Passed - Visit with authorizing provider in past 12 months or upcoming 90 days         Appointments  past 12m or future 3m with PCP    Date Provider   Last Visit   1/16/2020 Ulices Gaitan MD   Next Visit   1/29/2020 Ulices Gaitan MD

## 2020-01-22 DIAGNOSIS — G47.00 INSOMNIA, UNSPECIFIED TYPE: ICD-10-CM

## 2020-01-23 RX ORDER — TRAZODONE HYDROCHLORIDE 100 MG/1
100 TABLET ORAL NIGHTLY
Qty: 90 TABLET | Refills: 1 | Status: SHIPPED | OUTPATIENT
Start: 2020-01-23 | End: 2021-03-04 | Stop reason: SDUPTHER

## 2020-01-23 NOTE — PROGRESS NOTES
Refill Authorization Note     is requesting a refill authorization.    Brief assessment and rationale for refill: APPROVE: prr                Medication reconciliation completed: No                         Comments:   Requested Prescriptions   Pending Prescriptions Disp Refills    traZODone (DESYREL) 100 MG tablet [Pharmacy Med Name: TRAZODONE 100 MG TABLET] 90 tablet 1     Sig: TAKE 1 TABLET (100 MG TOTAL) BY MOUTH EVERY EVENING.       Psychiatry: Antidepressants - Serotonin Modulator Passed - 1/22/2020  2:09 AM        Passed - Patient is at least 18 years old        Passed - Office visit in past 6 months or future 90 days     Recent Outpatient Visits            1 week ago Dyslipidemia    [facility] Ulices Gaitan MD    1 month ago Complex renal cyst    [facility] Nephrology Ananda King MD    11 months ago CKD (chronic kidney disease) stage 3, GFR 30-59 ml/min    [facility] Shailesh Valente MD    1 year ago Essential hypertension    [facility] Ulices Gaitan MD    1 year ago CKD (chronic kidney disease) stage 3, GFR 30-59 ml/min    [facility] Nephlive King MD          Future Appointments              Tomorrow [place], [place] [facility] [place]    In 6 days Ulices Gaitan MD [facility]    In 4 weeks URINE [facility] [place]    In 4 weeks LAB, [place] [facility] [place]    In 4 weeks [facility] [place]    In 1 [facility]

## 2020-01-24 ENCOUNTER — LAB VISIT (OUTPATIENT)
Dept: LAB | Facility: HOSPITAL | Age: 70
End: 2020-01-24
Attending: INTERNAL MEDICINE
Payer: MEDICARE

## 2020-01-24 DIAGNOSIS — E78.5 DYSLIPIDEMIA: ICD-10-CM

## 2020-01-24 DIAGNOSIS — N18.4 CKD (CHRONIC KIDNEY DISEASE), STAGE IV: ICD-10-CM

## 2020-01-24 LAB
ALBUMIN SERPL BCP-MCNC: 3.6 G/DL (ref 3.5–5.2)
ALP SERPL-CCNC: 104 U/L (ref 55–135)
ALT SERPL W/O P-5'-P-CCNC: 13 U/L (ref 10–44)
ANION GAP SERPL CALC-SCNC: 9 MMOL/L (ref 8–16)
AST SERPL-CCNC: 22 U/L (ref 10–40)
BASOPHILS # BLD AUTO: 0.09 K/UL (ref 0–0.2)
BASOPHILS NFR BLD: 0.9 % (ref 0–1.9)
BILIRUB SERPL-MCNC: 0.2 MG/DL (ref 0.1–1)
BUN SERPL-MCNC: 43 MG/DL (ref 8–23)
CALCIUM SERPL-MCNC: 9.1 MG/DL (ref 8.7–10.5)
CHLORIDE SERPL-SCNC: 105 MMOL/L (ref 95–110)
CHOLEST SERPL-MCNC: 224 MG/DL (ref 120–199)
CHOLEST/HDLC SERPL: 5.1 {RATIO} (ref 2–5)
CO2 SERPL-SCNC: 18 MMOL/L (ref 23–29)
CREAT SERPL-MCNC: 2.9 MG/DL (ref 0.5–1.4)
DIFFERENTIAL METHOD: ABNORMAL
EOSINOPHIL # BLD AUTO: 0.6 K/UL (ref 0–0.5)
EOSINOPHIL NFR BLD: 6.1 % (ref 0–8)
ERYTHROCYTE [DISTWIDTH] IN BLOOD BY AUTOMATED COUNT: 13.1 % (ref 11.5–14.5)
EST. GFR  (AFRICAN AMERICAN): 18.6 ML/MIN/1.73 M^2
EST. GFR  (NON AFRICAN AMERICAN): 16.1 ML/MIN/1.73 M^2
GLUCOSE SERPL-MCNC: 85 MG/DL (ref 70–110)
HCT VFR BLD AUTO: 31.1 % (ref 37–48.5)
HDLC SERPL-MCNC: 44 MG/DL (ref 40–75)
HDLC SERPL: 19.6 % (ref 20–50)
HGB BLD-MCNC: 9.8 G/DL (ref 12–16)
IMM GRANULOCYTES # BLD AUTO: 0.02 K/UL (ref 0–0.04)
IMM GRANULOCYTES NFR BLD AUTO: 0.2 % (ref 0–0.5)
LDLC SERPL CALC-MCNC: 102.8 MG/DL (ref 63–159)
LYMPHOCYTES # BLD AUTO: 4.2 K/UL (ref 1–4.8)
LYMPHOCYTES NFR BLD: 40.8 % (ref 18–48)
MCH RBC QN AUTO: 29.5 PG (ref 27–31)
MCHC RBC AUTO-ENTMCNC: 31.5 G/DL (ref 32–36)
MCV RBC AUTO: 94 FL (ref 82–98)
MONOCYTES # BLD AUTO: 0.6 K/UL (ref 0.3–1)
MONOCYTES NFR BLD: 6 % (ref 4–15)
NEUTROPHILS # BLD AUTO: 4.7 K/UL (ref 1.8–7.7)
NEUTROPHILS NFR BLD: 46 % (ref 38–73)
NONHDLC SERPL-MCNC: 180 MG/DL
NRBC BLD-RTO: 0 /100 WBC
PLATELET # BLD AUTO: 339 K/UL (ref 150–350)
PMV BLD AUTO: 9.7 FL (ref 9.2–12.9)
POTASSIUM SERPL-SCNC: 5 MMOL/L (ref 3.5–5.1)
PROT SERPL-MCNC: 8 G/DL (ref 6–8.4)
RBC # BLD AUTO: 3.32 M/UL (ref 4–5.4)
SODIUM SERPL-SCNC: 132 MMOL/L (ref 136–145)
TRIGL SERPL-MCNC: 386 MG/DL (ref 30–150)
WBC # BLD AUTO: 10.23 K/UL (ref 3.9–12.7)

## 2020-01-24 PROCEDURE — 85025 COMPLETE CBC W/AUTO DIFF WBC: CPT | Mod: HCNC

## 2020-01-24 PROCEDURE — 80061 LIPID PANEL: CPT | Mod: HCNC

## 2020-01-24 PROCEDURE — 36415 COLL VENOUS BLD VENIPUNCTURE: CPT | Mod: HCNC,PO

## 2020-01-24 PROCEDURE — 80053 COMPREHEN METABOLIC PANEL: CPT | Mod: HCNC

## 2020-01-29 ENCOUNTER — OFFICE VISIT (OUTPATIENT)
Dept: FAMILY MEDICINE | Facility: CLINIC | Age: 70
End: 2020-01-29
Payer: MEDICARE

## 2020-01-29 VITALS
OXYGEN SATURATION: 93 % | DIASTOLIC BLOOD PRESSURE: 78 MMHG | HEART RATE: 68 BPM | WEIGHT: 182.13 LBS | BODY MASS INDEX: 33.51 KG/M2 | SYSTOLIC BLOOD PRESSURE: 139 MMHG | HEIGHT: 62 IN

## 2020-01-29 DIAGNOSIS — Z00.00 ROUTINE PHYSICAL EXAMINATION: Primary | ICD-10-CM

## 2020-01-29 DIAGNOSIS — N18.4 CKD (CHRONIC KIDNEY DISEASE), STAGE IV: ICD-10-CM

## 2020-01-29 DIAGNOSIS — I10 ESSENTIAL HYPERTENSION: ICD-10-CM

## 2020-01-29 DIAGNOSIS — F32.A DEPRESSION, UNSPECIFIED DEPRESSION TYPE: ICD-10-CM

## 2020-01-29 DIAGNOSIS — D63.8 ANEMIA, CHRONIC DISEASE: ICD-10-CM

## 2020-01-29 DIAGNOSIS — N17.9 AKI (ACUTE KIDNEY INJURY): ICD-10-CM

## 2020-01-29 DIAGNOSIS — G89.29 CHRONIC MIDLINE LOW BACK PAIN WITHOUT SCIATICA: ICD-10-CM

## 2020-01-29 DIAGNOSIS — M54.50 CHRONIC MIDLINE LOW BACK PAIN WITHOUT SCIATICA: ICD-10-CM

## 2020-01-29 DIAGNOSIS — Z87.39 HISTORY OF GIANT CELL ARTERITIS: ICD-10-CM

## 2020-01-29 PROCEDURE — 3078F DIAST BP <80 MM HG: CPT | Mod: HCNC,CPTII,S$GLB, | Performed by: INTERNAL MEDICINE

## 2020-01-29 PROCEDURE — 3078F PR MOST RECENT DIASTOLIC BLOOD PRESSURE < 80 MM HG: ICD-10-PCS | Mod: HCNC,CPTII,S$GLB, | Performed by: INTERNAL MEDICINE

## 2020-01-29 PROCEDURE — 99999 PR PBB SHADOW E&M-EST. PATIENT-LVL III: CPT | Mod: PBBFAC,HCNC,, | Performed by: INTERNAL MEDICINE

## 2020-01-29 PROCEDURE — 1159F MED LIST DOCD IN RCRD: CPT | Mod: HCNC,S$GLB,, | Performed by: INTERNAL MEDICINE

## 2020-01-29 PROCEDURE — 99397 PER PM REEVAL EST PAT 65+ YR: CPT | Mod: HCNC,S$GLB,, | Performed by: INTERNAL MEDICINE

## 2020-01-29 PROCEDURE — 99999 PR PBB SHADOW E&M-EST. PATIENT-LVL III: ICD-10-PCS | Mod: PBBFAC,HCNC,, | Performed by: INTERNAL MEDICINE

## 2020-01-29 PROCEDURE — 1159F PR MEDICATION LIST DOCUMENTED IN MEDICAL RECORD: ICD-10-PCS | Mod: HCNC,S$GLB,, | Performed by: INTERNAL MEDICINE

## 2020-01-29 PROCEDURE — 1101F PT FALLS ASSESS-DOCD LE1/YR: CPT | Mod: HCNC,CPTII,S$GLB, | Performed by: INTERNAL MEDICINE

## 2020-01-29 PROCEDURE — 1101F PR PT FALLS ASSESS DOC 0-1 FALLS W/OUT INJ PAST YR: ICD-10-PCS | Mod: HCNC,CPTII,S$GLB, | Performed by: INTERNAL MEDICINE

## 2020-01-29 PROCEDURE — 3075F SYST BP GE 130 - 139MM HG: CPT | Mod: HCNC,CPTII,S$GLB, | Performed by: INTERNAL MEDICINE

## 2020-01-29 PROCEDURE — 3075F PR MOST RECENT SYSTOLIC BLOOD PRESS GE 130-139MM HG: ICD-10-PCS | Mod: HCNC,CPTII,S$GLB, | Performed by: INTERNAL MEDICINE

## 2020-01-29 PROCEDURE — 99499 RISK ADDL DX/OHS AUDIT: ICD-10-PCS | Mod: HCNC,S$GLB,, | Performed by: INTERNAL MEDICINE

## 2020-01-29 PROCEDURE — 99397 PR PREVENTIVE VISIT,EST,65 & OVER: ICD-10-PCS | Mod: HCNC,S$GLB,, | Performed by: INTERNAL MEDICINE

## 2020-01-29 PROCEDURE — 99499 UNLISTED E&M SERVICE: CPT | Mod: HCNC,S$GLB,, | Performed by: INTERNAL MEDICINE

## 2020-01-29 RX ORDER — CLONIDINE HYDROCHLORIDE 0.1 MG/1
0.1 TABLET ORAL 3 TIMES DAILY PRN
Qty: 60 TABLET | Refills: 11 | Status: SHIPPED | OUTPATIENT
Start: 2020-01-29 | End: 2021-03-31 | Stop reason: SDUPTHER

## 2020-01-29 RX ORDER — NIFEDIPINE 30 MG/1
30 TABLET, EXTENDED RELEASE ORAL NIGHTLY
Qty: 30 TABLET | Refills: 11 | Status: SHIPPED | OUTPATIENT
Start: 2020-01-29 | End: 2021-03-31 | Stop reason: SDUPTHER

## 2020-01-29 NOTE — PROGRESS NOTES
Subjective:       Patient ID: Amanda Smith is a 69 y.o. female.    Chief Complaint: Annual Exam    Here for routine health maintenance.      Patient non complaint with Dr visits and meds in past.      Recall:   Patient received pacemaker after syncope episode.  Dr Rooney   Broke leg when fell.  Developed recurrent hives 2-3 days s/p pacemaker.  Went into anaphylactic shock and had to be intubated.  Occurred 5 times.   Now gets immunotherapy once a month to prevent recurrence because etiology was not found.  Zolar?-   Dr Piotr Tellez.    August had cervical neck fusion.  Helped but did not cure her pain.  Dr Marcos Ryan.     CHF - new dx 12/2019 with admit.  Was since admitted for dehydration from over diureses. No sob.  Dr Vincent Rooney     HTN - uncontrolled. meds were not explained or org well on DC.  Patient took 5 BP message and passing out from BP 64/40.   BP log over past 2 weeks shows very wide varriations: 190/86 - 72/47.  She does not take her Coreg or nefidine if her sbp < 100 out of fear of passing out.  When it is low, she feels very fatigued and weak.  A lot of times, her high BP is related to pain.    She takes her torsemide regardless every morning. (see below log).  No consitent time for lows or highs.     CKD IV- GFR 25;  Seeing renal in March.     Depression - controlled  Insomnia controlled.   Chronic low back pain - takes oxycodone about 2-3/ wk; phenergan as pain can cause nausea.  Currently in pain and just took pain med. Dr Ryan  COPD - no SOB      Review of Systems   Constitutional: Negative for appetite change and fever.   HENT: Negative for nosebleeds and trouble swallowing.    Eyes: Negative for discharge and visual disturbance.   Respiratory: Negative for choking and shortness of breath.    Cardiovascular: Negative for chest pain and palpitations.   Gastrointestinal: Negative for abdominal pain, nausea and vomiting.   Musculoskeletal: Positive for back pain. Negative for  arthralgias and joint swelling.   Skin: Negative for rash and wound.   Neurological: Negative for dizziness and syncope.   Psychiatric/Behavioral: Negative for confusion and dysphoric mood.       Objective:      Vitals:    01/29/20 0813   BP: 139/78   Pulse: 68     Physical Exam   Constitutional: She appears well-nourished.   Eyes: Conjunctivae and EOM are normal.   Neck: Trachea normal and normal range of motion. No thyromegaly present.   Cardiovascular: Normal heart sounds.   Edema negative   Pulmonary/Chest: Effort normal and breath sounds normal.   Abdominal: Soft. There is no hepatomegaly.   Neurological: No cranial nerve deficit.   DTR decreased bilateral   Skin: Skin is warm, dry and intact.   Psychiatric: She has a normal mood and affect.   Alert and Oriented    Vitals reviewed.              Assessment:       1. Routine physical examination    2. Essential hypertension    3. CKD (chronic kidney disease), stage IV    4. CELINE (acute kidney injury)    5. Chronic midline low back pain without sciatica    6. History of giant cell arteritis    7. Anemia, chronic disease    8. Depression, unspecified depression type        Plan:       Routine physical examination    Essential hypertension  -     NIFEdipine (PROCARDIA-XL) 30 MG (OSM) 24 hr tablet; Take 1 tablet (30 mg total) by mouth every evening.  Dispense: 30 tablet; Refill: 11  -     cloNIDine (CATAPRES) 0.1 MG tablet; Take 1 tablet (0.1 mg total) by mouth 3 (three) times daily as needed (BP > 160/100).  Dispense: 60 tablet; Refill: 11    CKD (chronic kidney disease), stage IV    CELINE (acute kidney injury)    Chronic midline low back pain without sciatica    History of giant cell arteritis    Anemia, chronic disease    Depression, unspecified depression type            Medication List with Changes/Refills   New Medications    CLONIDINE (CATAPRES) 0.1 MG TABLET    Take 1 tablet (0.1 mg total) by mouth 3 (three) times daily as needed (BP > 160/100).   Current  Medications    ACETAMINOPHEN (TYLENOL) 650 MG TBSR    Take 650 mg by mouth every 8 (eight) hours.    ATORVASTATIN (LIPITOR) 20 MG TABLET    Take 1 tablet (20 mg total) by mouth once daily.    BISACODYL ORAL    Take 10 mg by mouth.    CARVEDILOL (COREG) 12.5 MG TABLET        DIPHENHYDRAMINE (BENADRYL ALLERGY) 25 MG TABLET    Take 25 mg by mouth nightly as needed for Insomnia.    MONTELUKAST (SINGULAIR) 10 MG TABLET    Take 10 mg by mouth once daily.    OXYCODONE-ACETAMINOPHEN (PERCOCET)  MG PER TABLET    Take 1 tablet by mouth every 4 (four) hours as needed for Pain.    PROMETHAZINE (PHENERGAN) 25 MG TABLET    Take 1 tablet (25 mg total) by mouth every 6 (six) hours as needed for Nausea.    SERTRALINE (ZOLOFT) 50 MG TABLET    Take 1 tablet (50 mg total) by mouth once daily.    TIZANIDINE (ZANAFLEX) 4 MG TABLET    Take 4 mg by mouth every 6 (six) hours as needed.    TORSEMIDE (DEMADEX) 20 MG TAB        TRAZODONE (DESYREL) 100 MG TABLET    TAKE 1 TABLET (100 MG TOTAL) BY MOUTH EVERY EVENING.   Changed and/or Refilled Medications    Modified Medication Previous Medication    NIFEDIPINE (PROCARDIA-XL) 30 MG (OSM) 24 HR TABLET NIFEdipine (PROCARDIA-XL) 60 MG (OSM) 24 hr tablet       Take 1 tablet (30 mg total) by mouth every evening.    Take 1 tablet (60 mg total) by mouth once daily.     Wellness reviewed   Will decrease nefidipine at have her take at night bc of low readings and feeling severely fatigued with those readings.  Will add clonidine 0.1 prn > 160/100 tid for times BP high.   Handicap tag form filled out for patient.   Patient wants to wait on DEXA scan for now and will wait on pneumonia vac today bc in pain  Continue current management and monitor.    Counseled on regular exercise, maintenance of a healthy weight, balanced diet rich in fruits/vegetables and lean protein, and avoidance of unhealthy habits like smoking and excessive alcohol intake.   Also, counseled on importance of being compliant with  medication, health appointments, diet and exercise.     Follow up in about 6 months (around 7/29/2020).

## 2020-02-11 ENCOUNTER — TELEPHONE (OUTPATIENT)
Dept: NEPHROLOGY | Facility: CLINIC | Age: 70
End: 2020-02-11

## 2020-02-11 NOTE — TELEPHONE ENCOUNTER
She was in hospital last year for broken leg, pacemaker and developed allergic reaction they suspect could be pacemaker.  She gets shots once monthly for that at [facility].  She had neck surgery and was hospitalized in for CHF and a separate stay for dehydration.      She was hospitalized a lot at [facility].  I will get those labs from [facility] for review.     Please advise re: labs follow up.  She is currently scheduled for lab 2.21 with follow up in March.

## 2020-02-11 NOTE — TELEPHONE ENCOUNTER
Her labs indicate she might be on too much torsemide--a little dehydrated. Also any diarrhea?     She may not need that much torsemide everyday. Please have her alternate days of 20mg and 10mg, take half of the 20mg ot I can prescribe 10mg

## 2020-02-11 NOTE — TELEPHONE ENCOUNTER
----- Message from Mayo Herrera sent at 2/11/2020 10:55 AM CST -----  Contact: same  Patient called in and would like Dr. King to take a look at her labs that were done on 1/24/2020 for her PCP here at [facility].  Patient stated it showed her kidney levels had dropped.    Patient call back number is [phone]

## 2020-02-12 DIAGNOSIS — F32.A DEPRESSION, UNSPECIFIED DEPRESSION TYPE: ICD-10-CM

## 2020-02-14 RX ORDER — SERTRALINE HYDROCHLORIDE 50 MG/1
TABLET, FILM COATED ORAL
Qty: 90 TABLET | Refills: 1 | Status: SHIPPED | OUTPATIENT
Start: 2020-02-14 | End: 2020-07-28

## 2020-02-14 NOTE — PROGRESS NOTES
Refill Authorization Note     is requesting a refill authorization.    Brief assessment and rationale for refill: APPROVE: prr                                         Comments:   Requested Prescriptions   Pending Prescriptions Disp Refills    sertraline (ZOLOFT) 50 MG tablet [Pharmacy Med Name: SERTRALINE 50MG TABLETS] 90 tablet 1     Sig: TAKE 1 TABLET(50 MG) BY MOUTH EVERY DAY       Psychiatry:  Antidepressants - SSRI Passed - 2/14/2020 12:41 PM        Passed - Patient is at least 18 years old        Passed - Office visit in past 6 months or future 90 days.     Recent Outpatient Visits            2 weeks ago Routine physical examination    [facility] Ulices Gaitan MD    4 weeks ago Dyslipidemia    [facility] Ulices Gaitan MD    2 months ago Complex renal cyst    [facility] Nephrology Ananda King MD    11 months ago CKD (chronic kidney disease) stage 3, GFR 30-59 ml/min    [facility] Nephrology Shailesh Valente MD    1 year ago Essential hypertension    [facility] Ulices Gaitan MD          Future Appointments              In 1 week URINE [facility] [place]    In 1 week LAB, [place] [facility] [place]    In 1 week [facility] [place]    In 3 weeks [facility]    In 5 months Ulices Gaitan MD [facility]                 Appointments  past 12m or future 3m with PCP    Date Provider   Last Visit   1/29/2020 Ulices Gaitan MD   Next Visit   8/5/2020 Ulices Gaitan MD   .  ED visits in past 90 days: 0       Note composed:12:42 PM 02/14/2020

## 2020-02-21 ENCOUNTER — HOSPITAL ENCOUNTER (OUTPATIENT)
Dept: RADIOLOGY | Facility: HOSPITAL | Age: 70
Discharge: HOME | End: 2020-02-21
Attending: INTERNAL MEDICINE
Payer: MEDICARE

## 2020-02-21 DIAGNOSIS — N28.1 COMPLEX RENAL CYST: ICD-10-CM

## 2020-02-21 PROCEDURE — 76770 US EXAM ABDO BACK WALL COMP: CPT | Mod: 26,HCNC,, | Performed by: RADIOLOGY

## 2020-02-21 PROCEDURE — 76770 US EXAM ABDO BACK WALL COMP: CPT | Mod: TC,HCNC,PO

## 2020-02-21 PROCEDURE — 76770 US RETROPERITONEAL COMPLETE: ICD-10-PCS | Mod: 26,HCNC,, | Performed by: RADIOLOGY

## 2020-03-09 ENCOUNTER — PATIENT OUTREACH (OUTPATIENT)
Dept: ADMINISTRATIVE | Facility: OTHER | Age: 70
End: 2020-03-09

## 2020-03-10 ENCOUNTER — OFFICE VISIT (OUTPATIENT)
Dept: NEPHROLOGY | Facility: CLINIC | Age: 70
End: 2020-03-10
Payer: MEDICARE

## 2020-03-10 VITALS
SYSTOLIC BLOOD PRESSURE: 152 MMHG | HEIGHT: 62 IN | OXYGEN SATURATION: 98 % | HEART RATE: 95 BPM | DIASTOLIC BLOOD PRESSURE: 78 MMHG | BODY MASS INDEX: 34.03 KG/M2 | WEIGHT: 184.94 LBS

## 2020-03-10 DIAGNOSIS — L50.1 IDIOPATHIC URTICARIA: ICD-10-CM

## 2020-03-10 DIAGNOSIS — M54.50 CHRONIC LOW BACK PAIN, UNSPECIFIED BACK PAIN LATERALITY, UNSPECIFIED WHETHER SCIATICA PRESENT: ICD-10-CM

## 2020-03-10 DIAGNOSIS — N28.1 COMPLEX RENAL CYST: ICD-10-CM

## 2020-03-10 DIAGNOSIS — E66.9 OBESITY (BMI 30.0-34.9): ICD-10-CM

## 2020-03-10 DIAGNOSIS — N18.4 CKD (CHRONIC KIDNEY DISEASE) STAGE 4, GFR 15-29 ML/MIN: ICD-10-CM

## 2020-03-10 DIAGNOSIS — D63.8 ANEMIA OF CHRONIC DISEASE: ICD-10-CM

## 2020-03-10 DIAGNOSIS — R11.0 NAUSEA: ICD-10-CM

## 2020-03-10 DIAGNOSIS — G89.29 CHRONIC LOW BACK PAIN, UNSPECIFIED BACK PAIN LATERALITY, UNSPECIFIED WHETHER SCIATICA PRESENT: ICD-10-CM

## 2020-03-10 DIAGNOSIS — N18.30 CKD (CHRONIC KIDNEY DISEASE) STAGE 3, GFR 30-59 ML/MIN: Primary | ICD-10-CM

## 2020-03-10 DIAGNOSIS — I10 ESSENTIAL HYPERTENSION: ICD-10-CM

## 2020-03-10 DIAGNOSIS — R11.2 NAUSEA AND VOMITING, INTRACTABILITY OF VOMITING NOT SPECIFIED, UNSPECIFIED VOMITING TYPE: ICD-10-CM

## 2020-03-10 DIAGNOSIS — J44.9 CHRONIC OBSTRUCTIVE PULMONARY DISEASE, UNSPECIFIED COPD TYPE: ICD-10-CM

## 2020-03-10 PROCEDURE — 99214 OFFICE O/P EST MOD 30 MIN: CPT | Mod: HCNC,S$GLB,, | Performed by: INTERNAL MEDICINE

## 2020-03-10 PROCEDURE — 3078F DIAST BP <80 MM HG: CPT | Mod: HCNC,CPTII,S$GLB, | Performed by: INTERNAL MEDICINE

## 2020-03-10 PROCEDURE — 3077F PR MOST RECENT SYSTOLIC BLOOD PRESSURE >= 140 MM HG: ICD-10-PCS | Mod: HCNC,CPTII,S$GLB, | Performed by: INTERNAL MEDICINE

## 2020-03-10 PROCEDURE — 1101F PT FALLS ASSESS-DOCD LE1/YR: CPT | Mod: HCNC,CPTII,S$GLB, | Performed by: INTERNAL MEDICINE

## 2020-03-10 PROCEDURE — 1159F MED LIST DOCD IN RCRD: CPT | Mod: HCNC,S$GLB,, | Performed by: INTERNAL MEDICINE

## 2020-03-10 PROCEDURE — 1101F PR PT FALLS ASSESS DOC 0-1 FALLS W/OUT INJ PAST YR: ICD-10-PCS | Mod: HCNC,CPTII,S$GLB, | Performed by: INTERNAL MEDICINE

## 2020-03-10 PROCEDURE — 99999 PR PBB SHADOW E&M-EST. PATIENT-LVL III: ICD-10-PCS | Mod: PBBFAC,HCNC,, | Performed by: INTERNAL MEDICINE

## 2020-03-10 PROCEDURE — 99499 UNLISTED E&M SERVICE: CPT | Mod: HCNC,S$GLB,, | Performed by: INTERNAL MEDICINE

## 2020-03-10 PROCEDURE — 99999 PR PBB SHADOW E&M-EST. PATIENT-LVL III: CPT | Mod: PBBFAC,HCNC,, | Performed by: INTERNAL MEDICINE

## 2020-03-10 PROCEDURE — 1159F PR MEDICATION LIST DOCUMENTED IN MEDICAL RECORD: ICD-10-PCS | Mod: HCNC,S$GLB,, | Performed by: INTERNAL MEDICINE

## 2020-03-10 PROCEDURE — 99214 PR OFFICE/OUTPT VISIT, EST, LEVL IV, 30-39 MIN: ICD-10-PCS | Mod: HCNC,S$GLB,, | Performed by: INTERNAL MEDICINE

## 2020-03-10 PROCEDURE — 99499 RISK ADDL DX/OHS AUDIT: ICD-10-PCS | Mod: HCNC,S$GLB,, | Performed by: INTERNAL MEDICINE

## 2020-03-10 PROCEDURE — 3077F SYST BP >= 140 MM HG: CPT | Mod: HCNC,CPTII,S$GLB, | Performed by: INTERNAL MEDICINE

## 2020-03-10 PROCEDURE — 3078F PR MOST RECENT DIASTOLIC BLOOD PRESSURE < 80 MM HG: ICD-10-PCS | Mod: HCNC,CPTII,S$GLB, | Performed by: INTERNAL MEDICINE

## 2020-03-10 RX ORDER — PROMETHAZINE HYDROCHLORIDE 25 MG/1
25 TABLET ORAL EVERY 6 HOURS PRN
Qty: 45 TABLET | Refills: 6 | Status: SHIPPED | OUTPATIENT
Start: 2020-03-10 | End: 2021-04-21 | Stop reason: SDUPTHER

## 2020-03-10 RX ORDER — GABAPENTIN 800 MG/1
TABLET ORAL
COMMUNITY
Start: 2020-02-28 | End: 2020-03-10

## 2020-03-10 RX ORDER — CARVEDILOL 12.5 MG/1
12.5 TABLET ORAL 2 TIMES DAILY WITH MEALS
Qty: 180 TABLET | Refills: 1 | Status: SHIPPED | OUTPATIENT
Start: 2020-03-10 | End: 2020-08-24 | Stop reason: SDUPTHER

## 2020-03-10 RX ORDER — PANTOPRAZOLE SODIUM 40 MG/1
40 TABLET, DELAYED RELEASE ORAL DAILY
Qty: 30 TABLET | Refills: 6 | Status: SHIPPED | OUTPATIENT
Start: 2020-03-10 | End: 2021-05-17

## 2020-03-14 PROBLEM — N18.4 CKD (CHRONIC KIDNEY DISEASE) STAGE 4, GFR 15-29 ML/MIN: Status: ACTIVE | Noted: 2020-03-14

## 2020-03-15 NOTE — PROGRESS NOTES
"Subjective:       Patient ID: Amanda Smith is a 69 y.o. White female who presents for return patient evaluation for chronic renal failure.    Dr. Valente 2/2019: She fractured her right foot and has been in [facility] and now [facility].  She is looking forward to going home.  She reports that she has had many medication changes during these hospitalizations and has had a few episodes of urticaria.    Interval history 11/2019: she was hospitalized at [place] and underwent a CT scan which revealed a cyst on her kidney and she was advised to follow up with Nephrology     Interval history March 2020: She has many questions after she saw her PCP who informed her she is getting close to dialysis. She has n/v for the past few months but no weight loss. No dysgeusia. No LE edema on daily torsemide.       Review of Systems   Constitutional: Positive for fatigue. Negative for activity change.   HENT: Negative for facial swelling.    Eyes: Negative for visual disturbance.   Respiratory: Negative for shortness of breath.    Cardiovascular: Negative for leg swelling.   Gastrointestinal: Positive for nausea.   Endocrine: Positive for cold intolerance.   Genitourinary: Positive for flank pain. Negative for difficulty urinating, dysuria and hematuria.   Musculoskeletal: Positive for back pain.   Skin: Negative for rash.   Allergic/Immunologic: Negative for immunocompromised state.   Neurological: Negative for weakness and headaches.   Hematological: Does not bruise/bleed easily.   Psychiatric/Behavioral: Negative for confusion and decreased concentration.       The past medical, family and social histories were reviewed for this encounter.     BP (!) 152/78   Pulse 95   Ht 5' 2" (1.575 m)   Wt 83.9 kg (184 lb 15.5 oz)   SpO2 98%   BMI 33.83 kg/m²     Objective:      Physical Exam   Constitutional: She is oriented to person, place, and time. She appears well-nourished. No distress.   HENT:   Mouth/Throat: Oropharynx " is clear and moist.   Eyes: No scleral icterus.   Neck: No JVD present.   Cardiovascular: S1 normal and S2 normal. Exam reveals no friction rub.   Pulmonary/Chest: Breath sounds normal. She has no wheezes. She has no rales.   Abdominal: Soft.   Musculoskeletal: She exhibits no edema.   Neurological: She is alert and oriented to person, place, and time.   Skin: Skin is warm and dry.   Psychiatric: She has a normal mood and affect.   Nursing note and vitals reviewed.      Assessment:       1. CKD (chronic kidney disease) stage 3, GFR 30-59 ml/min    2. Chronic low back pain, unspecified back pain laterality, unspecified whether sciatica present    3. Nausea    4. CKD (chronic kidney disease) stage 4, GFR 15-29 ml/min    5. Complex renal cyst    6. Anemia of chronic disease    7. Obesity (BMI 30.0-34.9)    8. Nausea and vomiting, intractability of vomiting not specified, unspecified vomiting type    9. Idiopathic urticaria    10. Chronic obstructive pulmonary disease, unspecified COPD type    11. Essential hypertension        Plan:     Complex renal cyst  - stable    CKD Stage 3/4  - check labs in 3 weeks  - long discussion on PD s HD, she is interested in PD and handouts provided     HTN  - increase Coreg to BID    Mineral and Bone Disease  - check PTH    N/V  - does not appear to be uremia  - start PPI  - renew Phenergan

## 2020-03-30 ENCOUNTER — TELEPHONE (OUTPATIENT)
Dept: NEPHROLOGY | Facility: CLINIC | Age: 70
End: 2020-03-30

## 2020-03-30 NOTE — TELEPHONE ENCOUNTER
----- Message from Geena Hall sent at 3/30/2020  2:50 PM CDT -----  Contact: Pt  Type: Needs Medical Advice    Who Called:  Pt  Symptoms (please be specific):    How long has patient had these symptoms:    Pharmacy name and phone #:    Best Call Back Number:   Additional Information: asking for a callback  to reschedule appt on 04/03

## 2020-03-30 NOTE — TELEPHONE ENCOUNTER
She lives in a long-term community and doesn't feel safe for herself or the people around her to go out to give blood for lab.      She states she is feeling well since increased coreg to BID. BP averaging 150-155 over 70's.    Please advise re: reschedule.

## 2020-04-02 ENCOUNTER — PATIENT OUTREACH (OUTPATIENT)
Dept: ADMINISTRATIVE | Facility: OTHER | Age: 70
End: 2020-04-02

## 2020-04-02 NOTE — TELEPHONE ENCOUNTER
I agree, I do not want her getting labs right now. Please reschedule labs and appt for May. Thank you

## 2020-04-05 DIAGNOSIS — J30.81 ALLERGY TO CATS: ICD-10-CM

## 2020-04-08 RX ORDER — LEVOCETIRIZINE DIHYDROCHLORIDE 5 MG/1
TABLET, FILM COATED ORAL
Qty: 90 TABLET | Refills: 0 | OUTPATIENT
Start: 2020-04-08

## 2020-04-08 NOTE — PROGRESS NOTES
Pharmacy Call Documentation    Pharmacy Called:  [facility] Call Time: 6:03pm   Spoke with: Yemi 04/08/2020       Called to confirmed if refill request was initiated by patient or automated system. Seems like it was sent automatically.      Current Medication Requested: (refill encounter)   Requested Prescriptions     Pending Prescriptions Disp Refills    levocetirizine (XYZAL) 5 MG tablet [Pharmacy Med Name: LEVOCETIRIZINE 5MG TABLETS] 90 tablet 0     Sig: TAKE 1 TABLET(5 MG) BY MOUTH EVERY EVENING          Note composed: 04/08/2020 6:00 PM

## 2020-04-08 NOTE — PROGRESS NOTES
Refill Authorization Note     is requesting a refill authorization.    Brief assessment and rationale for refill: REVIEW: last commented as no longer taking / previously denied          Medication Therapy Plan: DC'D (1/16/20) by PCP; refused (1/21/20); Pharmacist says it was sent automatically by system    Medication reconciliation completed: Yes                         Comments:   Refill Center Care Gap Closure protocols temporarily suspended.   Requested Prescriptions   Pending Prescriptions Disp Refills    levocetirizine (XYZAL) 5 MG tablet [Pharmacy Med Name: LEVOCETIRIZINE 5MG TABLETS] 90 tablet 0     Sig: TAKE 1 TABLET(5 MG) BY MOUTH EVERY EVENING       Ear, Nose, and Throat:  Antihistamines Failed - 4/5/2020  5:37 AM        Failed - An appropriate indication is on the problem list     Allergic Rhinitis  Sinusitis  Seasonal Allergies              Passed - Patient is at least 18 years old        Passed - Office visit in past 12 months or future 90 days.     Recent Outpatient Visits            4 weeks ago CKD (chronic kidney disease) stage 3, GFR 30-59 ml/min    [facility] Nephrology Ananda King MD    2 months ago Routine physical examination    [facility] Ulices Gaitan MD    2 months ago Dyslipidemia    [facility] Ulices Gaitan MD    4 months ago Complex renal cyst    [facility] Nephrology Ananda King MD    1 year ago CKD (chronic kidney disease) stage 3, GFR 30-59 ml/min    [facility] Nephrology Shailesh Valente MD          Future Appointments              In 4 weeks [facility], [facility]    In 1 month [facility]    In 3 months Ulices Gaitan MD [facility]                Powered by [employer] - 4/5/2020  5:37 AM        The requested medication is not on the active medication list.         Appointments  past 12m or future 3m with PCP     Date Provider   Last Visit   1/29/2020 Ulices Gaitan MD   Next Visit   8/5/2020 Ulices Gaitan MD   .  ED visits in past 90 days: 0       Note composed:5:59 PM 04/08/2020

## 2020-04-09 RX ORDER — LEVOCETIRIZINE DIHYDROCHLORIDE 5 MG/1
5 TABLET, FILM COATED ORAL NIGHTLY
Qty: 90 TABLET | Refills: 0 | Status: SHIPPED | OUTPATIENT
Start: 2020-04-09 | End: 2020-07-01

## 2020-04-09 RX ORDER — LEVOCETIRIZINE DIHYDROCHLORIDE 5 MG/1
5 TABLET, FILM COATED ORAL NIGHTLY
COMMUNITY
End: 2020-04-09 | Stop reason: SDUPTHER

## 2020-04-09 NOTE — TELEPHONE ENCOUNTER
I have reviewed the assessment below. Levocetirizine d/c by PCP 1/16/20 Detailed Report,  Quick dc request.

## 2020-04-27 ENCOUNTER — PATIENT MESSAGE (OUTPATIENT)
Dept: UROLOGY | Facility: CLINIC | Age: 70
End: 2020-04-27

## 2020-05-05 ENCOUNTER — PATIENT MESSAGE (OUTPATIENT)
Dept: ADMINISTRATIVE | Facility: HOSPITAL | Age: 70
End: 2020-05-05

## 2020-06-01 ENCOUNTER — LAB VISIT (OUTPATIENT)
Dept: LAB | Facility: HOSPITAL | Age: 70
End: 2020-06-01
Attending: INTERNAL MEDICINE
Payer: MEDICARE

## 2020-06-01 DIAGNOSIS — N18.30 CKD (CHRONIC KIDNEY DISEASE) STAGE 3, GFR 30-59 ML/MIN: ICD-10-CM

## 2020-06-01 LAB
BASOPHILS # BLD AUTO: 0.08 K/UL (ref 0–0.2)
BASOPHILS NFR BLD: 0.7 % (ref 0–1.9)
DIFFERENTIAL METHOD: ABNORMAL
EOSINOPHIL # BLD AUTO: 0.4 K/UL (ref 0–0.5)
EOSINOPHIL NFR BLD: 3.2 % (ref 0–8)
ERYTHROCYTE [DISTWIDTH] IN BLOOD BY AUTOMATED COUNT: 14.3 % (ref 11.5–14.5)
HCT VFR BLD AUTO: 32.8 % (ref 37–48.5)
HGB BLD-MCNC: 10 G/DL (ref 12–16)
IMM GRANULOCYTES # BLD AUTO: 0.04 K/UL (ref 0–0.04)
IMM GRANULOCYTES NFR BLD AUTO: 0.4 % (ref 0–0.5)
LYMPHOCYTES # BLD AUTO: 3.2 K/UL (ref 1–4.8)
LYMPHOCYTES NFR BLD: 28.3 % (ref 18–48)
MCH RBC QN AUTO: 29.9 PG (ref 27–31)
MCHC RBC AUTO-ENTMCNC: 30.5 G/DL (ref 32–36)
MCV RBC AUTO: 98 FL (ref 82–98)
MONOCYTES # BLD AUTO: 0.6 K/UL (ref 0.3–1)
MONOCYTES NFR BLD: 5 % (ref 4–15)
NEUTROPHILS # BLD AUTO: 7 K/UL (ref 1.8–7.7)
NEUTROPHILS NFR BLD: 62.4 % (ref 38–73)
NRBC BLD-RTO: 0 /100 WBC
PLATELET # BLD AUTO: 379 K/UL (ref 150–350)
PMV BLD AUTO: 9.6 FL (ref 9.2–12.9)
RBC # BLD AUTO: 3.35 M/UL (ref 4–5.4)
WBC # BLD AUTO: 11.2 K/UL (ref 3.9–12.7)

## 2020-06-01 PROCEDURE — 36415 COLL VENOUS BLD VENIPUNCTURE: CPT | Mod: HCNC,PO

## 2020-06-01 PROCEDURE — 85025 COMPLETE CBC W/AUTO DIFF WBC: CPT | Mod: HCNC

## 2020-06-01 PROCEDURE — 80069 RENAL FUNCTION PANEL: CPT | Mod: HCNC

## 2020-06-02 LAB
ALBUMIN SERPL BCP-MCNC: 3.6 G/DL (ref 3.5–5.2)
ANION GAP SERPL CALC-SCNC: 9 MMOL/L (ref 8–16)
BUN SERPL-MCNC: 46 MG/DL (ref 8–23)
CALCIUM SERPL-MCNC: 8.5 MG/DL (ref 8.7–10.5)
CHLORIDE SERPL-SCNC: 113 MMOL/L (ref 95–110)
CO2 SERPL-SCNC: 18 MMOL/L (ref 23–29)
CREAT SERPL-MCNC: 2.1 MG/DL (ref 0.5–1.4)
EST. GFR  (AFRICAN AMERICAN): 27.5 ML/MIN/1.73 M^2
EST. GFR  (NON AFRICAN AMERICAN): 23.8 ML/MIN/1.73 M^2
GLUCOSE SERPL-MCNC: 107 MG/DL (ref 70–110)
PHOSPHATE SERPL-MCNC: 3.4 MG/DL (ref 2.7–4.5)
POTASSIUM SERPL-SCNC: 4.5 MMOL/L (ref 3.5–5.1)
SODIUM SERPL-SCNC: 140 MMOL/L (ref 136–145)

## 2020-06-04 ENCOUNTER — PATIENT OUTREACH (OUTPATIENT)
Dept: ADMINISTRATIVE | Facility: OTHER | Age: 70
End: 2020-06-04

## 2020-06-09 ENCOUNTER — OFFICE VISIT (OUTPATIENT)
Dept: NEPHROLOGY | Facility: CLINIC | Age: 70
End: 2020-06-09
Payer: MEDICARE

## 2020-06-09 DIAGNOSIS — I10 ESSENTIAL HYPERTENSION: ICD-10-CM

## 2020-06-09 DIAGNOSIS — L50.1 IDIOPATHIC URTICARIA: ICD-10-CM

## 2020-06-09 DIAGNOSIS — M79.7 FIBROMYALGIA: ICD-10-CM

## 2020-06-09 DIAGNOSIS — I44.7 LBBB (LEFT BUNDLE BRANCH BLOCK): ICD-10-CM

## 2020-06-09 DIAGNOSIS — E66.9 OBESITY (BMI 30.0-34.9): ICD-10-CM

## 2020-06-09 DIAGNOSIS — N28.1 COMPLEX RENAL CYST: ICD-10-CM

## 2020-06-09 DIAGNOSIS — J44.9 CHRONIC OBSTRUCTIVE PULMONARY DISEASE, UNSPECIFIED COPD TYPE: ICD-10-CM

## 2020-06-09 DIAGNOSIS — G89.29 CHRONIC LOW BACK PAIN, UNSPECIFIED BACK PAIN LATERALITY, UNSPECIFIED WHETHER SCIATICA PRESENT: ICD-10-CM

## 2020-06-09 DIAGNOSIS — N25.81 SECONDARY HYPERPARATHYROIDISM, RENAL: ICD-10-CM

## 2020-06-09 DIAGNOSIS — D50.8 OTHER IRON DEFICIENCY ANEMIA: ICD-10-CM

## 2020-06-09 DIAGNOSIS — M54.50 CHRONIC LOW BACK PAIN, UNSPECIFIED BACK PAIN LATERALITY, UNSPECIFIED WHETHER SCIATICA PRESENT: ICD-10-CM

## 2020-06-09 DIAGNOSIS — D63.8 ANEMIA OF CHRONIC DISEASE: ICD-10-CM

## 2020-06-09 DIAGNOSIS — N18.4 CKD (CHRONIC KIDNEY DISEASE) STAGE 4, GFR 15-29 ML/MIN: Primary | ICD-10-CM

## 2020-06-09 PROCEDURE — 99499 RISK ADDL DX/OHS AUDIT: ICD-10-PCS | Mod: HCNC,95,, | Performed by: INTERNAL MEDICINE

## 2020-06-09 PROCEDURE — 1159F PR MEDICATION LIST DOCUMENTED IN MEDICAL RECORD: ICD-10-PCS | Mod: HCNC,95,, | Performed by: INTERNAL MEDICINE

## 2020-06-09 PROCEDURE — 99214 OFFICE O/P EST MOD 30 MIN: CPT | Mod: HCNC,95,, | Performed by: INTERNAL MEDICINE

## 2020-06-09 PROCEDURE — 99499 UNLISTED E&M SERVICE: CPT | Mod: HCNC,95,, | Performed by: INTERNAL MEDICINE

## 2020-06-09 PROCEDURE — 99214 PR OFFICE/OUTPT VISIT, EST, LEVL IV, 30-39 MIN: ICD-10-PCS | Mod: HCNC,95,, | Performed by: INTERNAL MEDICINE

## 2020-06-09 PROCEDURE — 1101F PR PT FALLS ASSESS DOC 0-1 FALLS W/OUT INJ PAST YR: ICD-10-PCS | Mod: HCNC,CPTII,95, | Performed by: INTERNAL MEDICINE

## 2020-06-09 PROCEDURE — 1101F PT FALLS ASSESS-DOCD LE1/YR: CPT | Mod: HCNC,CPTII,95, | Performed by: INTERNAL MEDICINE

## 2020-06-09 PROCEDURE — 1159F MED LIST DOCD IN RCRD: CPT | Mod: HCNC,95,, | Performed by: INTERNAL MEDICINE

## 2020-06-09 RX ORDER — QUINIDINE GLUCONATE 324 MG
TABLET, EXTENDED RELEASE ORAL
Qty: 24 TABLET | Refills: 3 | Status: SHIPPED | OUTPATIENT
Start: 2020-06-09 | End: 2021-07-02

## 2020-06-09 RX ORDER — SODIUM BICARBONATE 650 MG/1
650 TABLET ORAL 2 TIMES DAILY
Qty: 180 TABLET | Refills: 1 | COMMUNITY
Start: 2020-06-09 | End: 2020-09-23 | Stop reason: SDUPTHER

## 2020-06-09 NOTE — PROGRESS NOTES
Subjective:       Patient ID: Amanda Smith is a 69 y.o. White female who presents for return patient evaluation for chronic renal failure.    Dr. Valente 2/2019: She fractured her right foot and has been in [facility] and now [facility].  She is looking forward to going home.  She reports that she has had many medication changes during these hospitalizations and has had a few episodes of urticaria.    Interval history 11/2019: she was hospitalized at [place] and underwent a CT scan which revealed a cyst on her kidney and she was advised to follow up with Nephrology     Interval history March 2020: She has many questions after she saw her PCP who informed her she is getting close to dialysis. She has n/v for the past few months but no weight loss. No dysgeusia. No LE edema on daily torsemide.     Interval history June 2020  The patient location is: Home ([facility] [place]  The chief complaint leading to consultation is: CKD    Visit type: audiovisual    Face to Face time with patient: 33 minutes  44 minutes of total time spent on the encounter, which includes face to face time and non-face to face time preparing to see the patient (eg, review of tests), Obtaining and/or reviewing separately obtained history, Documenting clinical information in the electronic or other health record, Independently interpreting results (not separately reported) and communicating results to the patient/family/caregiver, or Care coordination (not separately reported).   Each patient to whom he or she provides medical services by telemedicine is:  (1) informed of the relationship between the physician and patient and the respective role of any other health care provider with respect to management of the patient; and (2) notified that he or she may decline to receive medical services by telemedicine and may withdraw from such care at any time.    Notes: She is feeling OK except she still has hives intermittently, when on steroids  she has a SOB feeling. Her other complaint is insomnia.  No LE edema. BP is controlled     Review of Systems   Constitutional: Negative for activity change, appetite change and fatigue.   HENT: Negative for facial swelling.    Eyes: Negative for visual disturbance.   Respiratory: Negative for shortness of breath.    Cardiovascular: Negative for chest pain and leg swelling.   Gastrointestinal: Positive for constipation. Negative for diarrhea.   Endocrine: Negative for polydipsia and polyuria.   Genitourinary: Negative for difficulty urinating, dysuria and flank pain.   Musculoskeletal: Positive for arthralgias and back pain.   Skin: Positive for rash.   Allergic/Immunologic: Positive for immunocompromised state.   Neurological: Negative for weakness and headaches.   Hematological: Bruises/bleeds easily.   Psychiatric/Behavioral: Positive for sleep disturbance.           Objective:      Physical Exam  Vitals signs reviewed.   Constitutional:       Appearance: Normal appearance. She is not ill-appearing.   HENT:      Mouth/Throat:      Mouth: Mucous membranes are moist.   Pulmonary:      Effort: No respiratory distress.   Musculoskeletal:      Right lower leg: No edema.      Left lower leg: No edema.   Skin:     Coloration: Skin is not jaundiced.   Neurological:      General: No focal deficit present.      Mental Status: She is alert and oriented to person, place, and time. Mental status is at baseline.   Psychiatric:         Mood and Affect: Mood normal.         Thought Content: Thought content normal.         Assessment:       1. CKD (chronic kidney disease) stage 4, GFR 15-29 ml/min    2. Secondary hyperparathyroidism, renal    3. Essential hypertension    4. Complex renal cyst    5. Chronic low back pain, unspecified back pain laterality, unspecified whether sciatica present    6. Anemia of chronic disease    7. Idiopathic urticaria    8. Other iron deficiency anemia    9. Fibromyalgia    10. Chronic obstructive  pulmonary disease, unspecified COPD type    11. LBBB (left bundle branch block)    12. Obesity (BMI 30.0-34.9)        Plan:     Complex renal cyst  - stable    CKD Stage 3/4  - improved  - long discussion on PD vs HD at last visit, she is interested in PD    HTN  - controlled    Mineral and Bone Disease  - increasing PTH--will need D analogue, add next visit   - ADD sodium bicarb    Anemia  - continue iron      RTC 3mo

## 2020-06-20 PROBLEM — N25.81 SECONDARY HYPERPARATHYROIDISM, RENAL: Status: ACTIVE | Noted: 2020-06-20

## 2020-07-15 ENCOUNTER — LAB VISIT (OUTPATIENT)
Dept: PRIMARY CARE CLINIC | Facility: OTHER | Age: 70
End: 2020-07-15
Attending: INTERNAL MEDICINE
Payer: MEDICARE

## 2020-07-15 DIAGNOSIS — Z11.59 SPECIAL SCREENING EXAMINATION FOR UNSPECIFIED VIRAL DISEASE: ICD-10-CM

## 2020-07-15 PROCEDURE — U0003 INFECTIOUS AGENT DETECTION BY NUCLEIC ACID (DNA OR RNA); SEVERE ACUTE RESPIRATORY SYNDROME CORONAVIRUS 2 (SARS-COV-2) (CORONAVIRUS DISEASE [COVID-19]), AMPLIFIED PROBE TECHNIQUE, MAKING USE OF HIGH THROUGHPUT TECHNOLOGIES AS DESCRIBED BY CMS-2020-01-R: HCPCS | Mod: HCNC

## 2020-07-19 LAB — SARS-COV-2 RNA RESP QL NAA+PROBE: NOT DETECTED

## 2020-07-22 ENCOUNTER — PATIENT OUTREACH (OUTPATIENT)
Dept: ADMINISTRATIVE | Facility: HOSPITAL | Age: 70
End: 2020-07-22

## 2020-07-22 DIAGNOSIS — Z78.0 POST-MENOPAUSAL: Primary | ICD-10-CM

## 2020-07-22 NOTE — PROGRESS NOTES
Chart review completed 2020.  Care Everywhere updates requested and reviewed.  Immunizations reconciled. Media reports reviewed.  Duplicate HM overrides and  orders removed.  Overdue HM topic chart audit and/or requested.  Overdue lab testing linked to upcoming lab appointments if applies.    DIS reviewed for mammogram and bone density.    WOG orders placed for bone density.  Message sent to patient's portal.        Health Maintenance Due   Topic Date Due    Mammogram  1992    DEXA SCAN  1992    Shingles Vaccine (1 of 2) 2002    Pneumococcal Vaccine (65+ Low/Medium Risk) (1 of 2 - PCV13) 2017

## 2020-08-24 RX ORDER — METHYLPREDNISOLONE 4 MG/1
TABLET ORAL
Status: ON HOLD | COMMUNITY
Start: 2020-05-25 | End: 2021-03-29 | Stop reason: HOSPADM

## 2020-08-24 RX ORDER — FAMOTIDINE 20 MG/1
20 TABLET, FILM COATED ORAL 2 TIMES DAILY PRN
COMMUNITY
Start: 2020-06-14

## 2020-08-24 RX ORDER — CARVEDILOL 12.5 MG/1
12.5 TABLET ORAL 2 TIMES DAILY WITH MEALS
Qty: 180 TABLET | Refills: 1 | Status: SHIPPED | OUTPATIENT
Start: 2020-08-24 | End: 2021-02-12

## 2020-08-24 RX ORDER — FLUTICASONE PROPIONATE 50 MCG
1 SPRAY, SUSPENSION (ML) NASAL DAILY PRN
COMMUNITY
Start: 2020-06-30

## 2020-08-27 ENCOUNTER — TELEPHONE (OUTPATIENT)
Dept: FAMILY MEDICINE | Facility: CLINIC | Age: 70
End: 2020-08-27

## 2020-08-27 NOTE — TELEPHONE ENCOUNTER
Spoke with patient--she will call back to schedule 6 month follow up    Offer to schedule with me or PCP if she returns call

## 2020-09-15 RX ORDER — PANTOPRAZOLE SODIUM 40 MG/1
40 TABLET, DELAYED RELEASE ORAL DAILY
Qty: 30 TABLET | Refills: 6 | OUTPATIENT
Start: 2020-09-15 | End: 2021-09-15

## 2020-09-17 ENCOUNTER — TELEPHONE (OUTPATIENT)
Dept: NEPHROLOGY | Facility: CLINIC | Age: 70
End: 2020-09-17

## 2020-09-17 RX ORDER — PANTOPRAZOLE SODIUM 40 MG/1
40 TABLET, DELAYED RELEASE ORAL DAILY
Qty: 30 TABLET | Refills: 6 | Status: CANCELLED | OUTPATIENT
Start: 2020-09-17 | End: 2021-09-17

## 2020-09-17 RX ORDER — A/SINGAPORE/GP1908/2015 IVR-180 (AN A/MICHIGAN/45/2015 (H1N1)PDM09-LIKE VIRUS, A/HONG KONG/4801/2014, NYMC X-263B (H3N2) (AN A/HONG KONG/4801/2014-LIKE VIRUS), AND B/BRISBANE/60/2008, WILD TYPE (A B/BRISBANE/60/2008-LIKE VIRUS) 15; 15; 15 UG/.5ML; UG/.5ML; UG/.5ML
INJECTION, SUSPENSION INTRAMUSCULAR
Status: ON HOLD | COMMUNITY
Start: 2020-09-14 | End: 2021-03-29 | Stop reason: HOSPADM

## 2020-09-21 ENCOUNTER — LAB VISIT (OUTPATIENT)
Dept: LAB | Facility: HOSPITAL | Age: 70
End: 2020-09-21
Attending: INTERNAL MEDICINE
Payer: MEDICARE

## 2020-09-21 DIAGNOSIS — N18.4 CKD (CHRONIC KIDNEY DISEASE) STAGE 4, GFR 15-29 ML/MIN: ICD-10-CM

## 2020-09-21 LAB
ALBUMIN SERPL BCP-MCNC: 3.6 G/DL (ref 3.5–5.2)
ANION GAP SERPL CALC-SCNC: 11 MMOL/L (ref 8–16)
BASOPHILS # BLD AUTO: 0.11 K/UL (ref 0–0.2)
BASOPHILS NFR BLD: 0.9 % (ref 0–1.9)
BUN SERPL-MCNC: 38 MG/DL (ref 8–23)
CALCIUM SERPL-MCNC: 9.1 MG/DL (ref 8.7–10.5)
CHLORIDE SERPL-SCNC: 109 MMOL/L (ref 95–110)
CO2 SERPL-SCNC: 19 MMOL/L (ref 23–29)
CREAT SERPL-MCNC: 2.6 MG/DL (ref 0.5–1.4)
DIFFERENTIAL METHOD: ABNORMAL
EOSINOPHIL # BLD AUTO: 0.6 K/UL (ref 0–0.5)
EOSINOPHIL NFR BLD: 4.6 % (ref 0–8)
ERYTHROCYTE [DISTWIDTH] IN BLOOD BY AUTOMATED COUNT: 13.3 % (ref 11.5–14.5)
EST. GFR  (AFRICAN AMERICAN): 21.1 ML/MIN/1.73 M^2
EST. GFR  (NON AFRICAN AMERICAN): 18.3 ML/MIN/1.73 M^2
GLUCOSE SERPL-MCNC: 95 MG/DL (ref 70–110)
HCT VFR BLD AUTO: 33.4 % (ref 37–48.5)
HGB BLD-MCNC: 10.3 G/DL (ref 12–16)
IMM GRANULOCYTES # BLD AUTO: 0.03 K/UL (ref 0–0.04)
IMM GRANULOCYTES NFR BLD AUTO: 0.3 % (ref 0–0.5)
LYMPHOCYTES # BLD AUTO: 3.7 K/UL (ref 1–4.8)
LYMPHOCYTES NFR BLD: 30.8 % (ref 18–48)
MCH RBC QN AUTO: 28.1 PG (ref 27–31)
MCHC RBC AUTO-ENTMCNC: 30.8 G/DL (ref 32–36)
MCV RBC AUTO: 91 FL (ref 82–98)
MONOCYTES # BLD AUTO: 0.6 K/UL (ref 0.3–1)
MONOCYTES NFR BLD: 5.3 % (ref 4–15)
NEUTROPHILS # BLD AUTO: 7 K/UL (ref 1.8–7.7)
NEUTROPHILS NFR BLD: 58.1 % (ref 38–73)
NRBC BLD-RTO: 0 /100 WBC
PHOSPHATE SERPL-MCNC: 4.7 MG/DL (ref 2.7–4.5)
PLATELET # BLD AUTO: 406 K/UL (ref 150–350)
PMV BLD AUTO: 9.8 FL (ref 9.2–12.9)
POTASSIUM SERPL-SCNC: 4.8 MMOL/L (ref 3.5–5.1)
RBC # BLD AUTO: 3.67 M/UL (ref 4–5.4)
SODIUM SERPL-SCNC: 139 MMOL/L (ref 136–145)
WBC # BLD AUTO: 12 K/UL (ref 3.9–12.7)

## 2020-09-21 PROCEDURE — 36415 COLL VENOUS BLD VENIPUNCTURE: CPT | Mod: HCNC,PO

## 2020-09-21 PROCEDURE — 80069 RENAL FUNCTION PANEL: CPT | Mod: HCNC

## 2020-09-21 PROCEDURE — 83970 ASSAY OF PARATHORMONE: CPT | Mod: HCNC

## 2020-09-21 PROCEDURE — 85025 COMPLETE CBC W/AUTO DIFF WBC: CPT | Mod: HCNC

## 2020-09-22 ENCOUNTER — TELEPHONE (OUTPATIENT)
Dept: NEPHROLOGY | Facility: CLINIC | Age: 70
End: 2020-09-22

## 2020-09-22 LAB — PTH-INTACT SERPL-MCNC: 271 PG/ML (ref 9–77)

## 2020-09-22 NOTE — TELEPHONE ENCOUNTER
----- Message from Umm Bernal sent at 9/22/2020 10:41 AM CDT -----  Contact: patient  Name Of Caller:  Amanda      Provider Name: Ananda King    Does patient feel the need to be seen today? No      Relationship to the Pt?:  Patient     Contact Preference?: [phone]    What is the nature of the call?: Patient called and would like a call back regarding appointment tomorrow if it can be an audio call instead

## 2020-09-23 ENCOUNTER — OFFICE VISIT (OUTPATIENT)
Dept: NEPHROLOGY | Facility: CLINIC | Age: 70
End: 2020-09-23
Payer: MEDICARE

## 2020-09-23 DIAGNOSIS — I44.7 LBBB (LEFT BUNDLE BRANCH BLOCK): ICD-10-CM

## 2020-09-23 DIAGNOSIS — J44.9 CHRONIC OBSTRUCTIVE PULMONARY DISEASE, UNSPECIFIED COPD TYPE: ICD-10-CM

## 2020-09-23 DIAGNOSIS — I10 ESSENTIAL HYPERTENSION: ICD-10-CM

## 2020-09-23 DIAGNOSIS — D63.8 ANEMIA OF CHRONIC DISEASE: ICD-10-CM

## 2020-09-23 DIAGNOSIS — M54.12 RADICULOPATHY, CERVICAL: ICD-10-CM

## 2020-09-23 DIAGNOSIS — E66.9 OBESITY WITH SERIOUS COMORBIDITY, UNSPECIFIED CLASSIFICATION, UNSPECIFIED OBESITY TYPE: ICD-10-CM

## 2020-09-23 DIAGNOSIS — M54.50 CHRONIC LOW BACK PAIN, UNSPECIFIED BACK PAIN LATERALITY, UNSPECIFIED WHETHER SCIATICA PRESENT: ICD-10-CM

## 2020-09-23 DIAGNOSIS — D50.8 OTHER IRON DEFICIENCY ANEMIA: ICD-10-CM

## 2020-09-23 DIAGNOSIS — N28.1 COMPLEX RENAL CYST: ICD-10-CM

## 2020-09-23 DIAGNOSIS — G89.29 CHRONIC LOW BACK PAIN, UNSPECIFIED BACK PAIN LATERALITY, UNSPECIFIED WHETHER SCIATICA PRESENT: ICD-10-CM

## 2020-09-23 DIAGNOSIS — E66.9 OBESITY (BMI 30.0-34.9): ICD-10-CM

## 2020-09-23 DIAGNOSIS — M79.7 FIBROMYALGIA: ICD-10-CM

## 2020-09-23 DIAGNOSIS — L50.1 IDIOPATHIC URTICARIA: ICD-10-CM

## 2020-09-23 DIAGNOSIS — N18.4 CKD (CHRONIC KIDNEY DISEASE) STAGE 4, GFR 15-29 ML/MIN: Primary | ICD-10-CM

## 2020-09-23 DIAGNOSIS — N25.81 SECONDARY HYPERPARATHYROIDISM, RENAL: ICD-10-CM

## 2020-09-23 PROCEDURE — 1101F PR PT FALLS ASSESS DOC 0-1 FALLS W/OUT INJ PAST YR: ICD-10-PCS | Mod: HCNC,CPTII,95, | Performed by: INTERNAL MEDICINE

## 2020-09-23 PROCEDURE — 99499 UNLISTED E&M SERVICE: CPT | Mod: HCNC,95,, | Performed by: INTERNAL MEDICINE

## 2020-09-23 PROCEDURE — 99214 OFFICE O/P EST MOD 30 MIN: CPT | Mod: HCNC,95,, | Performed by: INTERNAL MEDICINE

## 2020-09-23 PROCEDURE — 1159F PR MEDICATION LIST DOCUMENTED IN MEDICAL RECORD: ICD-10-PCS | Mod: HCNC,95,, | Performed by: INTERNAL MEDICINE

## 2020-09-23 PROCEDURE — 1159F MED LIST DOCD IN RCRD: CPT | Mod: HCNC,95,, | Performed by: INTERNAL MEDICINE

## 2020-09-23 PROCEDURE — 99499 RISK ADDL DX/OHS AUDIT: ICD-10-PCS | Mod: HCNC,95,, | Performed by: INTERNAL MEDICINE

## 2020-09-23 PROCEDURE — 1101F PT FALLS ASSESS-DOCD LE1/YR: CPT | Mod: HCNC,CPTII,95, | Performed by: INTERNAL MEDICINE

## 2020-09-23 PROCEDURE — 99214 PR OFFICE/OUTPT VISIT, EST, LEVL IV, 30-39 MIN: ICD-10-PCS | Mod: HCNC,95,, | Performed by: INTERNAL MEDICINE

## 2020-09-23 RX ORDER — SODIUM BICARBONATE 650 MG/1
650 TABLET ORAL 2 TIMES DAILY
Qty: 180 TABLET | Refills: 1 | Status: SHIPPED | OUTPATIENT
Start: 2020-09-23 | End: 2021-07-02

## 2020-09-23 NOTE — PROGRESS NOTES
Subjective:       Patient ID: Amanda Smith is a 70 y.o. White female who presents for return patient evaluation for chronic renal failure.    Dr. Valente 2/2019: She fractured her right foot and has been in [facility] and now [facility].  She is looking forward to going home.  She reports that she has had many medication changes during these hospitalizations and has had a few episodes of urticaria.    Interval history 11/2019: she was hospitalized at [place] and underwent a CT scan which revealed a cyst on her kidney and she was advised to follow up with Nephrology     Interval history March 2020: She has many questions after she saw her PCP who informed her she is getting close to dialysis. She has n/v for the past few months but no weight loss. No dysgeusia. No LE edema on daily torsemide.     Interval history June 2020  The patient location is: Home ([facility])  The chief complaint leading to consultation is: CKD  Visit type: audiovisual  Face to Face time with patient: 33 minutes  44 minutes of total time spent on the encounter, which includes face to face time and non-face to face time preparing to see the patient (eg, review of tests), Obtaining and/or reviewing separately obtained history, Documenting clinical information in the electronic or other health record, Independently interpreting results (not separately reported) and communicating results to the patient/family/caregiver, or Care coordination (not separately reported).   Each patient to whom he or she provides medical services by telemedicine is:  (1) informed of the relationship between the physician and patient and the respective role of any other health care provider with respect to management of the patient; and (2) notified that he or she may decline to receive medical services by telemedicine and may withdraw from such care at any time.  Notes: She is feeling OK except she still has hives intermittently, when on steroids she has  a SOB feeling. Her other complaint is insomnia.  No LE edema. BP is controlled     Interval history Sep 2020:   The patient location is: Home  The chief complaint leading to consultation is: CKD  Visit type: audiovisual  Face to Face time with patient: 29 minutes  45 minutes of total time spent on the encounter, which includes face to face time and non-face to face time preparing to see the patient (eg, review of tests), Obtaining and/or reviewing separately obtained history, Documenting clinical information in the electronic or other health record, Independently interpreting results (not separately reported) and communicating results to the patient/family/caregiver, or Care coordination (not separately reported).   Each patient to whom he or she provides medical services by telemedicine is:  (1) informed of the relationship between the physician and patient and the respective role of any other health care provider with respect to management of the patient; and (2) notified that he or she may decline to receive medical services by telemedicine and may withdraw from such care at any time.  Notes: She complains of weeks of diarrhea and 8lb weight loss. Dry mouth. No nausea. Some anorexia. Can't take a shower without pausing from diarrhea. No fever.  No LE edema    Review of Systems   Constitutional: Positive for activity change, appetite change and fatigue. Negative for fever.   HENT: Negative for facial swelling.    Eyes: Negative for visual disturbance.   Respiratory: Negative for shortness of breath.    Cardiovascular: Negative for chest pain and leg swelling.   Gastrointestinal: Positive for diarrhea. Negative for constipation, nausea and vomiting.   Endocrine: Negative for polydipsia and polyuria.   Genitourinary: Positive for decreased urine volume (a little darker, maybe?). Negative for difficulty urinating, dysuria and flank pain.   Musculoskeletal: Positive for arthralgias and back pain.   Skin: Negative for  rash (urticaria improved).   Allergic/Immunologic: Positive for immunocompromised state.   Neurological: Positive for weakness. Negative for headaches.   Hematological: Bruises/bleeds easily.   Psychiatric/Behavioral: Positive for sleep disturbance. Negative for confusion and decreased concentration.           Objective:      Physical Exam  Constitutional:       General: She is not in acute distress.  Pulmonary:      Effort: No respiratory distress.   Neurological:      Mental Status: She is alert and oriented to person, place, and time. Mental status is at baseline.   Psychiatric:         Mood and Affect: Mood normal.         Behavior: Behavior normal.         Thought Content: Thought content normal.         Judgment: Judgment normal.         Assessment:       1. CKD (chronic kidney disease) stage 4, GFR 15-29 ml/min    2. Complex renal cyst    3. Essential hypertension    4. Secondary hyperparathyroidism, renal    5. Obesity (BMI 30.0-34.9)    6. Anemia of chronic disease    7. Idiopathic urticaria    8. Other iron deficiency anemia    9. Fibromyalgia    10. Chronic obstructive pulmonary disease, unspecified COPD type    11. LBBB (left bundle branch block)    12. Chronic low back pain, unspecified back pain laterality, unspecified whether sciatica present    13. Obesity with serious comorbidity, unspecified classification, unspecified obesity type    14. Radiculopathy, cervical        Plan:       Diarrhea  - advised for her to call PCP when our visit done    Complex renal cyst  - stable, due 2/2021    CKD Stage 3/4  - increased creatinine likely due to volume depletion   - long discussion on PD vs HD during previous visit, she is interested in PD    HTN  - monitor    Mineral and Bone Disease  - continue to trend PTH--will need D analogue, add next visit   - ADD sodium bicarb    Anemia  - continue iron  - due for Tsat and ferritin       RTC 3mo

## 2020-09-29 ENCOUNTER — PATIENT MESSAGE (OUTPATIENT)
Dept: OTHER | Facility: OTHER | Age: 70
End: 2020-09-29

## 2020-10-05 ENCOUNTER — PATIENT MESSAGE (OUTPATIENT)
Dept: ADMINISTRATIVE | Facility: HOSPITAL | Age: 70
End: 2020-10-05

## 2020-10-05 ENCOUNTER — PATIENT MESSAGE (OUTPATIENT)
Dept: NEPHROLOGY | Facility: CLINIC | Age: 70
End: 2020-10-05

## 2020-10-23 ENCOUNTER — LAB VISIT (OUTPATIENT)
Dept: LAB | Facility: OTHER | Age: 70
End: 2020-10-23
Payer: MEDICARE

## 2020-10-23 DIAGNOSIS — Z03.818 ENCOUNTER FOR OBSERVATION FOR SUSPECTED EXPOSURE TO OTHER BIOLOGICAL AGENTS RULED OUT: ICD-10-CM

## 2020-10-23 PROCEDURE — U0003 INFECTIOUS AGENT DETECTION BY NUCLEIC ACID (DNA OR RNA); SEVERE ACUTE RESPIRATORY SYNDROME CORONAVIRUS 2 (SARS-COV-2) (CORONAVIRUS DISEASE [COVID-19]), AMPLIFIED PROBE TECHNIQUE, MAKING USE OF HIGH THROUGHPUT TECHNOLOGIES AS DESCRIBED BY CMS-2020-01-R: HCPCS | Mod: HCNC

## 2020-10-25 LAB — SARS-COV-2 RNA RESP QL NAA+PROBE: NOT DETECTED

## 2020-11-02 ENCOUNTER — PATIENT OUTREACH (OUTPATIENT)
Dept: ADMINISTRATIVE | Facility: HOSPITAL | Age: 70
End: 2020-11-02

## 2020-11-04 ENCOUNTER — TELEPHONE (OUTPATIENT)
Dept: FAMILY MEDICINE | Facility: CLINIC | Age: 70
End: 2020-11-04

## 2020-11-04 NOTE — TELEPHONE ENCOUNTER
----- Message from Neto Hickey sent at 11/4/2020  1:25 PM CST -----  Regarding: Results  Contact: Patient  Type:  Test Results    Who Called:  Patient    Name of Test (Lab/Mammo/Etc):  COVID    Date of Test: 10/23/2020    Ordering Provider: N/A    Where the test was performed: [facility]    Would the patient rather a call back or a response via [facility]? Call    Best Call Back Number: [phone] ([place])     Additional Information:   N/A

## 2020-11-17 ENCOUNTER — PATIENT OUTREACH (OUTPATIENT)
Dept: ADMINISTRATIVE | Facility: HOSPITAL | Age: 70
End: 2020-11-17

## 2020-11-17 NOTE — PROGRESS NOTES
Recently reviewed/outreached      HTN GAP REPORT  LAST OFFICE VISIT DOCUMENTED READING WAS >140/90.  REQUESTING A REMOTE HOME BLOOD PRESSURE READING TO DOCUMENT IN DISCRETE FIELD OR PATIENT NEEDS TO SCHEDULE AN OFFICE VISIT OR NURSE VISIT FOR A BP CHECK

## 2020-12-11 ENCOUNTER — PATIENT MESSAGE (OUTPATIENT)
Dept: OTHER | Facility: OTHER | Age: 70
End: 2020-12-11

## 2021-01-04 ENCOUNTER — PATIENT MESSAGE (OUTPATIENT)
Dept: ADMINISTRATIVE | Facility: HOSPITAL | Age: 71
End: 2021-01-04

## 2021-02-01 ENCOUNTER — LAB VISIT (OUTPATIENT)
Dept: LAB | Facility: OTHER | Age: 71
End: 2021-02-01
Payer: MEDICARE

## 2021-02-01 DIAGNOSIS — Z20.822 ENCOUNTER FOR LABORATORY TESTING FOR COVID-19 VIRUS: ICD-10-CM

## 2021-02-01 PROCEDURE — U0003 INFECTIOUS AGENT DETECTION BY NUCLEIC ACID (DNA OR RNA); SEVERE ACUTE RESPIRATORY SYNDROME CORONAVIRUS 2 (SARS-COV-2) (CORONAVIRUS DISEASE [COVID-19]), AMPLIFIED PROBE TECHNIQUE, MAKING USE OF HIGH THROUGHPUT TECHNOLOGIES AS DESCRIBED BY CMS-2020-01-R: HCPCS

## 2021-02-02 LAB — SARS-COV-2 RNA RESP QL NAA+PROBE: NOT DETECTED

## 2021-03-04 ENCOUNTER — LAB VISIT (OUTPATIENT)
Dept: LAB | Facility: HOSPITAL | Age: 71
End: 2021-03-04
Attending: INTERNAL MEDICINE
Payer: MEDICARE

## 2021-03-04 DIAGNOSIS — D50.8 OTHER IRON DEFICIENCY ANEMIA: ICD-10-CM

## 2021-03-04 DIAGNOSIS — I50.32 CHRONIC DIASTOLIC HEART FAILURE: Primary | ICD-10-CM

## 2021-03-04 DIAGNOSIS — N18.4 CKD (CHRONIC KIDNEY DISEASE) STAGE 4, GFR 15-29 ML/MIN: ICD-10-CM

## 2021-03-04 DIAGNOSIS — G47.00 INSOMNIA, UNSPECIFIED TYPE: ICD-10-CM

## 2021-03-04 LAB
BASOPHILS # BLD AUTO: 0.11 K/UL (ref 0–0.2)
BASOPHILS NFR BLD: 0.9 % (ref 0–1.9)
DIFFERENTIAL METHOD: ABNORMAL
EOSINOPHIL # BLD AUTO: 1.1 K/UL (ref 0–0.5)
EOSINOPHIL NFR BLD: 9 % (ref 0–8)
ERYTHROCYTE [DISTWIDTH] IN BLOOD BY AUTOMATED COUNT: 14.6 % (ref 11.5–14.5)
HCT VFR BLD AUTO: 30.9 % (ref 37–48.5)
HGB BLD-MCNC: 9.2 G/DL (ref 12–16)
IMM GRANULOCYTES # BLD AUTO: 0.03 K/UL (ref 0–0.04)
IMM GRANULOCYTES NFR BLD AUTO: 0.3 % (ref 0–0.5)
LYMPHOCYTES # BLD AUTO: 3.1 K/UL (ref 1–4.8)
LYMPHOCYTES NFR BLD: 26.3 % (ref 18–48)
MCH RBC QN AUTO: 28.2 PG (ref 27–31)
MCHC RBC AUTO-ENTMCNC: 29.8 G/DL (ref 32–36)
MCV RBC AUTO: 95 FL (ref 82–98)
MONOCYTES # BLD AUTO: 0.8 K/UL (ref 0.3–1)
MONOCYTES NFR BLD: 6.5 % (ref 4–15)
NEUTROPHILS # BLD AUTO: 6.7 K/UL (ref 1.8–7.7)
NEUTROPHILS NFR BLD: 57 % (ref 38–73)
NRBC BLD-RTO: 0 /100 WBC
PLATELET # BLD AUTO: 389 K/UL (ref 150–350)
PMV BLD AUTO: 9.5 FL (ref 9.2–12.9)
RBC # BLD AUTO: 3.26 M/UL (ref 4–5.4)
WBC # BLD AUTO: 11.76 K/UL (ref 3.9–12.7)

## 2021-03-04 PROCEDURE — 84443 ASSAY THYROID STIM HORMONE: CPT | Performed by: INTERNAL MEDICINE

## 2021-03-04 PROCEDURE — 82728 ASSAY OF FERRITIN: CPT | Performed by: INTERNAL MEDICINE

## 2021-03-04 PROCEDURE — 80069 RENAL FUNCTION PANEL: CPT | Performed by: INTERNAL MEDICINE

## 2021-03-04 PROCEDURE — 80061 LIPID PANEL: CPT | Performed by: INTERNAL MEDICINE

## 2021-03-04 PROCEDURE — 83970 ASSAY OF PARATHORMONE: CPT | Performed by: INTERNAL MEDICINE

## 2021-03-04 PROCEDURE — 83036 HEMOGLOBIN GLYCOSYLATED A1C: CPT | Mod: GA | Performed by: INTERNAL MEDICINE

## 2021-03-04 PROCEDURE — 85025 COMPLETE CBC W/AUTO DIFF WBC: CPT | Performed by: INTERNAL MEDICINE

## 2021-03-04 PROCEDURE — 36415 COLL VENOUS BLD VENIPUNCTURE: CPT | Mod: PN | Performed by: INTERNAL MEDICINE

## 2021-03-04 PROCEDURE — 83540 ASSAY OF IRON: CPT | Performed by: INTERNAL MEDICINE

## 2021-03-04 PROCEDURE — 80053 COMPREHEN METABOLIC PANEL: CPT | Performed by: INTERNAL MEDICINE

## 2021-03-05 LAB
ALBUMIN SERPL BCP-MCNC: 3.7 G/DL (ref 3.5–5.2)
ALBUMIN SERPL BCP-MCNC: 3.8 G/DL (ref 3.5–5.2)
ALP SERPL-CCNC: 123 U/L (ref 55–135)
ALT SERPL W/O P-5'-P-CCNC: 13 U/L (ref 10–44)
ANION GAP SERPL CALC-SCNC: 14 MMOL/L (ref 8–16)
ANION GAP SERPL CALC-SCNC: 18 MMOL/L (ref 8–16)
AST SERPL-CCNC: 20 U/L (ref 10–40)
BILIRUB SERPL-MCNC: 0.2 MG/DL (ref 0.1–1)
BUN SERPL-MCNC: 41 MG/DL (ref 8–23)
BUN SERPL-MCNC: 44 MG/DL (ref 8–23)
CALCIUM SERPL-MCNC: 8.7 MG/DL (ref 8.7–10.5)
CALCIUM SERPL-MCNC: 8.8 MG/DL (ref 8.7–10.5)
CHLORIDE SERPL-SCNC: 107 MMOL/L (ref 95–110)
CHLORIDE SERPL-SCNC: 107 MMOL/L (ref 95–110)
CHOLEST SERPL-MCNC: 228 MG/DL (ref 120–199)
CHOLEST/HDLC SERPL: 4.3 {RATIO} (ref 2–5)
CO2 SERPL-SCNC: 16 MMOL/L (ref 23–29)
CO2 SERPL-SCNC: 20 MMOL/L (ref 23–29)
CREAT SERPL-MCNC: 3.6 MG/DL (ref 0.5–1.4)
CREAT SERPL-MCNC: 3.7 MG/DL (ref 0.5–1.4)
EST. GFR  (AFRICAN AMERICAN): 13.8 ML/MIN/1.73 M^2
EST. GFR  (AFRICAN AMERICAN): 14.2 ML/MIN/1.73 M^2
EST. GFR  (NON AFRICAN AMERICAN): 11.9 ML/MIN/1.73 M^2
EST. GFR  (NON AFRICAN AMERICAN): 12.3 ML/MIN/1.73 M^2
ESTIMATED AVG GLUCOSE: 108 MG/DL (ref 68–131)
FERRITIN SERPL-MCNC: 129 NG/ML (ref 20–300)
GLUCOSE SERPL-MCNC: 59 MG/DL (ref 70–110)
GLUCOSE SERPL-MCNC: 63 MG/DL (ref 70–110)
HBA1C MFR BLD: 5.4 % (ref 4–5.6)
HDLC SERPL-MCNC: 53 MG/DL (ref 40–75)
HDLC SERPL: 23.2 % (ref 20–50)
IRON SERPL-MCNC: 39 UG/DL (ref 30–160)
LDLC SERPL CALC-MCNC: 136.8 MG/DL (ref 63–159)
NONHDLC SERPL-MCNC: 175 MG/DL
PHOSPHATE SERPL-MCNC: 5.5 MG/DL (ref 2.7–4.5)
POTASSIUM SERPL-SCNC: 5.4 MMOL/L (ref 3.5–5.1)
POTASSIUM SERPL-SCNC: 5.4 MMOL/L (ref 3.5–5.1)
PROT SERPL-MCNC: 8.1 G/DL (ref 6–8.4)
PTH-INTACT SERPL-MCNC: 604 PG/ML (ref 9–77)
SATURATED IRON: 13 % (ref 20–50)
SODIUM SERPL-SCNC: 141 MMOL/L (ref 136–145)
SODIUM SERPL-SCNC: 141 MMOL/L (ref 136–145)
TOTAL IRON BINDING CAPACITY: 305 UG/DL (ref 250–450)
TRANSFERRIN SERPL-MCNC: 206 MG/DL (ref 200–375)
TRIGL SERPL-MCNC: 191 MG/DL (ref 30–150)
TSH SERPL DL<=0.005 MIU/L-ACNC: 2.38 UIU/ML (ref 0.4–4)

## 2021-03-07 RX ORDER — TRAZODONE HYDROCHLORIDE 100 MG/1
100 TABLET ORAL NIGHTLY
Qty: 90 TABLET | Refills: 1 | Status: SHIPPED | OUTPATIENT
Start: 2021-03-07 | End: 2021-04-21

## 2021-03-11 ENCOUNTER — OFFICE VISIT (OUTPATIENT)
Dept: NEPHROLOGY | Facility: CLINIC | Age: 71
End: 2021-03-11
Payer: MEDICARE

## 2021-03-11 DIAGNOSIS — N25.81 SECONDARY HYPERPARATHYROIDISM, RENAL: ICD-10-CM

## 2021-03-11 DIAGNOSIS — N28.1 COMPLEX RENAL CYST: ICD-10-CM

## 2021-03-11 DIAGNOSIS — N18.4 CKD (CHRONIC KIDNEY DISEASE) STAGE 4, GFR 15-29 ML/MIN: Primary | ICD-10-CM

## 2021-03-11 DIAGNOSIS — M31.6 GIANT CELL ARTERITIS: ICD-10-CM

## 2021-03-11 DIAGNOSIS — M79.7 FIBROMYALGIA: ICD-10-CM

## 2021-03-11 DIAGNOSIS — I10 ESSENTIAL HYPERTENSION: ICD-10-CM

## 2021-03-11 DIAGNOSIS — E66.9 OBESITY WITH SERIOUS COMORBIDITY, UNSPECIFIED CLASSIFICATION, UNSPECIFIED OBESITY TYPE: ICD-10-CM

## 2021-03-11 DIAGNOSIS — D63.8 ANEMIA OF CHRONIC DISEASE: ICD-10-CM

## 2021-03-11 DIAGNOSIS — J44.9 CHRONIC OBSTRUCTIVE PULMONARY DISEASE, UNSPECIFIED COPD TYPE: ICD-10-CM

## 2021-03-11 DIAGNOSIS — D50.8 OTHER IRON DEFICIENCY ANEMIA: ICD-10-CM

## 2021-03-11 DIAGNOSIS — R19.7 DIARRHEA, UNSPECIFIED TYPE: ICD-10-CM

## 2021-03-11 PROCEDURE — 99443 PR PHYSICIAN TELEPHONE EVALUATION 21-30 MIN: CPT | Mod: 95,,, | Performed by: INTERNAL MEDICINE

## 2021-03-11 PROCEDURE — 99443 PR PHYSICIAN TELEPHONE EVALUATION 21-30 MIN: ICD-10-PCS | Mod: 95,,, | Performed by: INTERNAL MEDICINE

## 2021-03-11 RX ORDER — CARVEDILOL 25 MG/1
25 TABLET ORAL 2 TIMES DAILY
Qty: 180 TABLET | Refills: 1 | Status: SHIPPED | OUTPATIENT
Start: 2021-03-11 | End: 2021-07-02

## 2021-03-21 ENCOUNTER — TELEPHONE (OUTPATIENT)
Dept: NEPHROLOGY | Facility: CLINIC | Age: 71
End: 2021-03-21

## 2021-03-21 DIAGNOSIS — E86.9 VOLUME DEPLETION: ICD-10-CM

## 2021-03-21 DIAGNOSIS — R53.83 FATIGUE, UNSPECIFIED TYPE: ICD-10-CM

## 2021-03-21 DIAGNOSIS — D50.8 OTHER IRON DEFICIENCY ANEMIA: ICD-10-CM

## 2021-03-21 DIAGNOSIS — N17.9 AKI (ACUTE KIDNEY INJURY): Primary | ICD-10-CM

## 2021-03-21 PROBLEM — D50.9 IDA (IRON DEFICIENCY ANEMIA): Status: ACTIVE | Noted: 2021-03-21

## 2021-03-21 RX ORDER — EPINEPHRINE 0.3 MG/.3ML
0.3 INJECTION SUBCUTANEOUS ONCE AS NEEDED
Status: CANCELLED | OUTPATIENT
Start: 2021-03-22

## 2021-03-21 RX ORDER — HEPARIN 100 UNIT/ML
500 SYRINGE INTRAVENOUS
Status: CANCELLED | OUTPATIENT
Start: 2021-03-22

## 2021-03-21 RX ORDER — DIPHENHYDRAMINE HYDROCHLORIDE 50 MG/ML
50 INJECTION INTRAMUSCULAR; INTRAVENOUS ONCE AS NEEDED
Status: CANCELLED | OUTPATIENT
Start: 2021-03-22

## 2021-03-21 RX ORDER — SODIUM CHLORIDE 0.9 % (FLUSH) 0.9 %
10 SYRINGE (ML) INJECTION
Status: CANCELLED | OUTPATIENT
Start: 2021-03-22

## 2021-03-21 RX ORDER — METHYLPREDNISOLONE SOD SUCC 125 MG
125 VIAL (EA) INJECTION ONCE AS NEEDED
Status: CANCELLED | OUTPATIENT
Start: 2021-03-22

## 2021-03-23 PROBLEM — N18.9 RENAL FAILURE (ARF), ACUTE ON CHRONIC: Status: ACTIVE | Noted: 2021-03-23
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
## 2021-03-23 PROBLEM — N17.9 RENAL FAILURE (ARF), ACUTE ON CHRONIC: Status: ACTIVE | Noted: 2021-03-23

## 2021-03-23 PROBLEM — R29.6 FALLS: Status: ACTIVE | Noted: 2021-03-23

## 2021-03-23 PROBLEM — Z71.89 ACP (ADVANCE CARE PLANNING): Status: ACTIVE | Noted: 2021-03-23

## 2021-03-23 PROBLEM — N17.9 AKI (ACUTE KIDNEY INJURY): Status: ACTIVE | Noted: 2021-03-23

## 2021-03-26 PROBLEM — N18.4 CKD (CHRONIC KIDNEY DISEASE) STAGE 4, GFR 15-29 ML/MIN: Status: RESOLVED | Noted: 2020-03-14 | Resolved: 2021-03-26

## 2021-03-26 PROBLEM — N18.9 RENAL FAILURE (ARF), ACUTE ON CHRONIC: Status: RESOLVED | Noted: 2021-03-23 | Resolved: 2021-03-26

## 2021-03-26 PROBLEM — N17.9 RENAL FAILURE (ARF), ACUTE ON CHRONIC: Status: RESOLVED | Noted: 2021-03-23 | Resolved: 2021-03-26

## 2021-03-30 ENCOUNTER — TELEPHONE (OUTPATIENT)
Dept: FAMILY MEDICINE | Facility: CLINIC | Age: 71
End: 2021-03-30

## 2021-03-31 DIAGNOSIS — I10 ESSENTIAL HYPERTENSION: ICD-10-CM

## 2021-04-02 RX ORDER — NIFEDIPINE 30 MG/1
30 TABLET, EXTENDED RELEASE ORAL NIGHTLY
Qty: 30 TABLET | Refills: 11 | Status: SHIPPED | OUTPATIENT
Start: 2021-04-02 | End: 2021-07-02 | Stop reason: SDUPTHER

## 2021-04-02 RX ORDER — CLONIDINE HYDROCHLORIDE 0.1 MG/1
0.1 TABLET ORAL 3 TIMES DAILY PRN
Qty: 60 TABLET | Refills: 11 | Status: ON HOLD | OUTPATIENT
Start: 2021-04-02 | End: 2022-01-01 | Stop reason: HOSPADM

## 2021-04-14 ENCOUNTER — TELEPHONE (OUTPATIENT)
Dept: INFUSION THERAPY | Facility: HOSPITAL | Age: 71
End: 2021-04-14

## 2021-04-21 ENCOUNTER — OFFICE VISIT (OUTPATIENT)
Dept: FAMILY MEDICINE | Facility: CLINIC | Age: 71
End: 2021-04-21
Payer: MEDICARE

## 2021-04-21 VITALS
WEIGHT: 195.13 LBS | SYSTOLIC BLOOD PRESSURE: 132 MMHG | DIASTOLIC BLOOD PRESSURE: 82 MMHG | HEART RATE: 95 BPM | OXYGEN SATURATION: 96 % | BODY MASS INDEX: 35.91 KG/M2 | HEIGHT: 62 IN

## 2021-04-21 DIAGNOSIS — G47.00 INSOMNIA, UNSPECIFIED TYPE: ICD-10-CM

## 2021-04-21 DIAGNOSIS — G89.29 CHRONIC LOW BACK PAIN, UNSPECIFIED BACK PAIN LATERALITY, UNSPECIFIED WHETHER SCIATICA PRESENT: ICD-10-CM

## 2021-04-21 DIAGNOSIS — M31.6 TEMPORAL ARTERITIS: ICD-10-CM

## 2021-04-21 DIAGNOSIS — Z09 HOSPITAL DISCHARGE FOLLOW-UP: Primary | ICD-10-CM

## 2021-04-21 DIAGNOSIS — M54.50 CHRONIC LOW BACK PAIN, UNSPECIFIED BACK PAIN LATERALITY, UNSPECIFIED WHETHER SCIATICA PRESENT: ICD-10-CM

## 2021-04-21 DIAGNOSIS — R11.0 NAUSEA: ICD-10-CM

## 2021-04-21 PROCEDURE — 3008F PR BODY MASS INDEX (BMI) DOCUMENTED: ICD-10-PCS | Mod: CPTII,S$GLB,, | Performed by: INTERNAL MEDICINE

## 2021-04-21 PROCEDURE — 3288F FALL RISK ASSESSMENT DOCD: CPT | Mod: CPTII,S$GLB,, | Performed by: INTERNAL MEDICINE

## 2021-04-21 PROCEDURE — 1126F PR PAIN SEVERITY QUANTIFIED, NO PAIN PRESENT: ICD-10-PCS | Mod: S$GLB,,, | Performed by: INTERNAL MEDICINE

## 2021-04-21 PROCEDURE — 3288F PR FALLS RISK ASSESSMENT DOCUMENTED: ICD-10-PCS | Mod: CPTII,S$GLB,, | Performed by: INTERNAL MEDICINE

## 2021-04-21 PROCEDURE — 99999 PR PBB SHADOW E&M-EST. PATIENT-LVL III: ICD-10-PCS | Mod: PBBFAC,,, | Performed by: INTERNAL MEDICINE

## 2021-04-21 PROCEDURE — 99499 UNLISTED E&M SERVICE: CPT | Mod: S$GLB,,, | Performed by: INTERNAL MEDICINE

## 2021-04-21 PROCEDURE — 1126F AMNT PAIN NOTED NONE PRSNT: CPT | Mod: S$GLB,,, | Performed by: INTERNAL MEDICINE

## 2021-04-21 PROCEDURE — 99215 PR OFFICE/OUTPT VISIT, EST, LEVL V, 40-54 MIN: ICD-10-PCS | Mod: S$GLB,,, | Performed by: INTERNAL MEDICINE

## 2021-04-21 PROCEDURE — 1101F PR PT FALLS ASSESS DOC 0-1 FALLS W/OUT INJ PAST YR: ICD-10-PCS | Mod: CPTII,S$GLB,, | Performed by: INTERNAL MEDICINE

## 2021-04-21 PROCEDURE — 3008F BODY MASS INDEX DOCD: CPT | Mod: CPTII,S$GLB,, | Performed by: INTERNAL MEDICINE

## 2021-04-21 PROCEDURE — 99999 PR PBB SHADOW E&M-EST. PATIENT-LVL III: CPT | Mod: PBBFAC,,, | Performed by: INTERNAL MEDICINE

## 2021-04-21 PROCEDURE — 99499 RISK ADDL DX/OHS AUDIT: ICD-10-PCS | Mod: S$GLB,,, | Performed by: INTERNAL MEDICINE

## 2021-04-21 PROCEDURE — 1101F PT FALLS ASSESS-DOCD LE1/YR: CPT | Mod: CPTII,S$GLB,, | Performed by: INTERNAL MEDICINE

## 2021-04-21 PROCEDURE — 1159F MED LIST DOCD IN RCRD: CPT | Mod: S$GLB,,, | Performed by: INTERNAL MEDICINE

## 2021-04-21 PROCEDURE — 1159F PR MEDICATION LIST DOCUMENTED IN MEDICAL RECORD: ICD-10-PCS | Mod: S$GLB,,, | Performed by: INTERNAL MEDICINE

## 2021-04-21 PROCEDURE — 99215 OFFICE O/P EST HI 40 MIN: CPT | Mod: S$GLB,,, | Performed by: INTERNAL MEDICINE

## 2021-04-21 RX ORDER — CALCITRIOL 0.25 UG/1
0.25 CAPSULE ORAL DAILY
Qty: 30 CAPSULE | Refills: 0 | Status: SHIPPED | OUTPATIENT
Start: 2021-04-21 | End: 2021-04-21

## 2021-04-21 RX ORDER — TRAZODONE HYDROCHLORIDE 100 MG/1
200 TABLET ORAL NIGHTLY PRN
Qty: 180 TABLET | Refills: 2 | Status: SHIPPED | OUTPATIENT
Start: 2021-04-21 | End: 2021-10-25

## 2021-04-21 RX ORDER — PROMETHAZINE HYDROCHLORIDE 25 MG/1
25 TABLET ORAL EVERY 6 HOURS PRN
Qty: 45 TABLET | Refills: 6 | Status: SHIPPED | OUTPATIENT
Start: 2021-04-21

## 2021-05-19 PROBLEM — N18.6 ESRD (END STAGE RENAL DISEASE): Status: ACTIVE | Noted: 2021-05-19

## 2021-05-28 RX ORDER — MUPIROCIN 20 MG/G
OINTMENT TOPICAL
Qty: 30 G | Refills: 11 | Status: SHIPPED | OUTPATIENT
Start: 2021-05-28

## 2021-05-28 RX ORDER — FLUCONAZOLE 100 MG/1
TABLET ORAL
Qty: 2 TABLET | Refills: 1 | Status: SHIPPED | OUTPATIENT
Start: 2021-05-28 | End: 2022-09-30

## 2021-05-28 RX ORDER — CEPHALEXIN 500 MG/1
500 CAPSULE ORAL 2 TIMES DAILY WITH MEALS
Qty: 20 CAPSULE | Refills: 0 | Status: SHIPPED | OUTPATIENT
Start: 2021-05-28 | End: 2021-06-07

## 2021-06-11 RX ORDER — DOXYCYCLINE 100 MG/1
100 CAPSULE ORAL 2 TIMES DAILY WITH MEALS
Qty: 28 CAPSULE | Refills: 0 | Status: SHIPPED | OUTPATIENT
Start: 2021-06-11 | End: 2021-06-25

## 2021-06-17 RX ORDER — RIFAMPIN 300 MG/1
300 CAPSULE ORAL DAILY
Qty: 10 CAPSULE | Refills: 0 | Status: SHIPPED | OUTPATIENT
Start: 2021-06-17 | End: 2021-06-27

## 2021-06-24 RX ORDER — CALCIUM ACETATE 667 MG/1
CAPSULE ORAL
Qty: 450 CAPSULE | Refills: 3 | Status: SHIPPED | OUTPATIENT
Start: 2021-06-24 | End: 2023-01-01

## 2021-07-02 DIAGNOSIS — I10 ESSENTIAL HYPERTENSION: ICD-10-CM

## 2021-07-02 RX ORDER — CARVEDILOL 25 MG/1
TABLET ORAL
Qty: 180 TABLET | Refills: 3 | Status: SHIPPED | OUTPATIENT
Start: 2021-07-02

## 2021-07-02 RX ORDER — NIFEDIPINE 60 MG/1
60 TABLET, EXTENDED RELEASE ORAL NIGHTLY
Qty: 90 TABLET | Refills: 1 | Status: SHIPPED | OUTPATIENT
Start: 2021-07-02 | End: 2021-11-11

## 2021-07-21 RX ORDER — TORSEMIDE 20 MG/1
40 TABLET ORAL EVERY MORNING
Qty: 180 TABLET | Refills: 1 | OUTPATIENT
Start: 2021-07-21 | End: 2022-02-23

## 2021-10-27 RX ORDER — ONDANSETRON 4 MG/1
4 TABLET, FILM COATED ORAL EVERY 8 HOURS PRN
Qty: 45 TABLET | Refills: 1 | Status: SHIPPED | OUTPATIENT
Start: 2021-10-27 | End: 2022-09-15

## 2021-10-27 RX ORDER — PANTOPRAZOLE SODIUM 40 MG/1
40 TABLET, DELAYED RELEASE ORAL DAILY
Qty: 90 TABLET | Refills: 1 | Status: SHIPPED | OUTPATIENT
Start: 2021-10-27 | End: 2022-09-30

## 2021-11-11 ENCOUNTER — TELEPHONE (OUTPATIENT)
Dept: NEPHROLOGY | Facility: CLINIC | Age: 71
End: 2021-11-11
Payer: MEDICARE

## 2021-11-11 DIAGNOSIS — I10 ESSENTIAL HYPERTENSION: ICD-10-CM

## 2021-11-11 DIAGNOSIS — H26.9 CATARACT, UNSPECIFIED CATARACT TYPE, UNSPECIFIED LATERALITY: Primary | ICD-10-CM

## 2021-11-11 RX ORDER — NIFEDIPINE 90 MG/1
90 TABLET, EXTENDED RELEASE ORAL NIGHTLY
Qty: 90 TABLET | Refills: 1 | Status: SHIPPED | OUTPATIENT
Start: 2021-11-11 | End: 2022-05-01

## 2021-11-23 ENCOUNTER — TELEPHONE (OUTPATIENT)
Dept: NEPHROLOGY | Facility: CLINIC | Age: 71
End: 2021-11-23
Payer: MEDICARE

## 2021-12-28 RX ORDER — VALSARTAN 160 MG/1
160 TABLET ORAL DAILY
Qty: 90 TABLET | Refills: 1 | Status: SHIPPED | OUTPATIENT
Start: 2021-12-28 | End: 2022-01-25 | Stop reason: SDUPTHER

## 2021-12-30 ENCOUNTER — PATIENT OUTREACH (OUTPATIENT)
Dept: ADMINISTRATIVE | Facility: OTHER | Age: 71
End: 2021-12-30
Payer: MEDICARE

## 2021-12-30 ENCOUNTER — PATIENT MESSAGE (OUTPATIENT)
Dept: ADMINISTRATIVE | Facility: OTHER | Age: 71
End: 2021-12-30
Payer: MEDICARE

## 2021-12-30 DIAGNOSIS — Z12.31 SCREENING MAMMOGRAM FOR BREAST CANCER: Primary | ICD-10-CM

## 2022-01-01 ENCOUNTER — TELEPHONE (OUTPATIENT)
Dept: NEPHROLOGY | Facility: CLINIC | Age: 72
End: 2022-01-01
Payer: MEDICARE

## 2022-01-01 ENCOUNTER — PATIENT OUTREACH (OUTPATIENT)
Dept: ADMINISTRATIVE | Facility: OTHER | Age: 72
End: 2022-01-01
Payer: MEDICARE

## 2022-01-01 ENCOUNTER — OUTPATIENT CASE MANAGEMENT (OUTPATIENT)
Dept: ADMINISTRATIVE | Facility: OTHER | Age: 72
End: 2022-01-01
Payer: MEDICARE

## 2022-01-01 DIAGNOSIS — R19.00 ABDOMINAL MASS, UNSPECIFIED ABDOMINAL LOCATION: ICD-10-CM

## 2022-01-01 DIAGNOSIS — R10.9 ABDOMINAL PAIN, UNSPECIFIED ABDOMINAL LOCATION: Primary | ICD-10-CM

## 2022-01-01 RX ORDER — ALPRAZOLAM 0.25 MG/1
0.25 TABLET ORAL NIGHTLY PRN
Qty: 15 TABLET | Refills: 0 | Status: SHIPPED | OUTPATIENT
Start: 2022-01-01 | End: 2023-01-01 | Stop reason: SDUPTHER

## 2022-01-01 RX ORDER — CINACALCET 30 MG/1
30 TABLET, FILM COATED ORAL
Qty: 90 TABLET | Refills: 1 | Status: SHIPPED | OUTPATIENT
Start: 2022-01-01 | End: 2023-10-17

## 2022-01-05 ENCOUNTER — OFFICE VISIT (OUTPATIENT)
Dept: OPHTHALMOLOGY | Facility: CLINIC | Age: 72
End: 2022-01-05
Payer: MEDICARE

## 2022-01-05 DIAGNOSIS — H35.373 EPIRETINAL MEMBRANE, BOTH EYES: ICD-10-CM

## 2022-01-05 DIAGNOSIS — H25.11 AGE-RELATED NUCLEAR CATARACT OF RIGHT EYE: Primary | ICD-10-CM

## 2022-01-05 PROCEDURE — 99999 PR PBB SHADOW E&M-EST. PATIENT-LVL III: CPT | Mod: PBBFAC,HCNC,, | Performed by: OPHTHALMOLOGY

## 2022-01-05 PROCEDURE — 3288F FALL RISK ASSESSMENT DOCD: CPT | Mod: HCNC,CPTII,S$GLB, | Performed by: OPHTHALMOLOGY

## 2022-01-05 PROCEDURE — 1101F PT FALLS ASSESS-DOCD LE1/YR: CPT | Mod: HCNC,CPTII,S$GLB, | Performed by: OPHTHALMOLOGY

## 2022-01-05 PROCEDURE — 1160F PR REVIEW ALL MEDS BY PRESCRIBER/CLIN PHARMACIST DOCUMENTED: ICD-10-PCS | Mod: HCNC,CPTII,S$GLB, | Performed by: OPHTHALMOLOGY

## 2022-01-05 PROCEDURE — 1159F MED LIST DOCD IN RCRD: CPT | Mod: HCNC,CPTII,S$GLB, | Performed by: OPHTHALMOLOGY

## 2022-01-05 PROCEDURE — 99204 PR OFFICE/OUTPT VISIT, NEW, LEVL IV, 45-59 MIN: ICD-10-PCS | Mod: HCNC,S$GLB,, | Performed by: OPHTHALMOLOGY

## 2022-01-05 PROCEDURE — 92015 PR REFRACTION: ICD-10-PCS | Mod: HCNC,S$GLB,, | Performed by: OPHTHALMOLOGY

## 2022-01-05 PROCEDURE — 3288F PR FALLS RISK ASSESSMENT DOCUMENTED: ICD-10-PCS | Mod: HCNC,CPTII,S$GLB, | Performed by: OPHTHALMOLOGY

## 2022-01-05 PROCEDURE — 1101F PR PT FALLS ASSESS DOC 0-1 FALLS W/OUT INJ PAST YR: ICD-10-PCS | Mod: HCNC,CPTII,S$GLB, | Performed by: OPHTHALMOLOGY

## 2022-01-05 PROCEDURE — 92015 DETERMINE REFRACTIVE STATE: CPT | Mod: HCNC,S$GLB,, | Performed by: OPHTHALMOLOGY

## 2022-01-05 PROCEDURE — 1160F RVW MEDS BY RX/DR IN RCRD: CPT | Mod: HCNC,CPTII,S$GLB, | Performed by: OPHTHALMOLOGY

## 2022-01-05 PROCEDURE — 1126F PR PAIN SEVERITY QUANTIFIED, NO PAIN PRESENT: ICD-10-PCS | Mod: HCNC,CPTII,S$GLB, | Performed by: OPHTHALMOLOGY

## 2022-01-05 PROCEDURE — 92134 CPTRZ OPH DX IMG PST SGM RTA: CPT | Mod: HCNC,S$GLB,, | Performed by: OPHTHALMOLOGY

## 2022-01-05 PROCEDURE — 92134 POSTERIOR SEGMENT OCT RETINA (OCULAR COHERENCE TOMOGRAPHY)-BOTH EYES: ICD-10-PCS | Mod: HCNC,S$GLB,, | Performed by: OPHTHALMOLOGY

## 2022-01-05 PROCEDURE — 99999 PR PBB SHADOW E&M-EST. PATIENT-LVL III: ICD-10-PCS | Mod: PBBFAC,HCNC,, | Performed by: OPHTHALMOLOGY

## 2022-01-05 PROCEDURE — 1126F AMNT PAIN NOTED NONE PRSNT: CPT | Mod: HCNC,CPTII,S$GLB, | Performed by: OPHTHALMOLOGY

## 2022-01-05 PROCEDURE — 99204 OFFICE O/P NEW MOD 45 MIN: CPT | Mod: HCNC,S$GLB,, | Performed by: OPHTHALMOLOGY

## 2022-01-05 PROCEDURE — 1159F PR MEDICATION LIST DOCUMENTED IN MEDICAL RECORD: ICD-10-PCS | Mod: HCNC,CPTII,S$GLB, | Performed by: OPHTHALMOLOGY

## 2022-01-05 NOTE — PROGRESS NOTES
HPI     Concerns About Ocular Health      Additional comments: Decrease in vision               Comments     DLS: 1/13/17    Pt states she lost her insurance after last visit and was not able to come   back. Decrease in va over past year. Just started dialysis. + new floaters   and flashes OS.           Last edited by Cheryle Quintana on 1/5/2022 10:29 AM. (History)        ROS     Negative for: Constitutional, Gastrointestinal, Neurological, Skin,   Genitourinary, Musculoskeletal, HENT, Endocrine, Cardiovascular, Eyes,   Respiratory, Psychiatric, Allergic/Imm, Heme/Lymph    Last edited by Miguel Angel Rasmussen Jr., MD on 1/5/2022 11:22 AM. (History)        Assessment /Plan     For exam results, see Encounter Report.    Age-related nuclear cataract of right eye    Epiretinal membrane, both eyes  -     Posterior Segment OCT Retina-Both eyes    OCT MAC OU- ERM OU with intraretinal cysts  -schedule cataract surgery, right  -RBA discussed with patient  -Risks of worse vision, loss of vision, infection, bleeding, re-surgery,and may need to have surgery done by a different physician was explained to the patient  -patient was also counseled on premium lens options, laser cataract, and astigmatic correction  -patient was also counseled that they may still need glasses after surgery to help improve their vision, especially the need for reading glasses for reading small print texts after surgery  -patient understood the risks involved with cataract surgery and wanted to move forward with surgery  Follow up in about 1 month (around 2/5/2022) for Measurements, H&P, and consents.                    chest pain

## 2022-01-06 ENCOUNTER — TELEPHONE (OUTPATIENT)
Dept: OPHTHALMOLOGY | Facility: CLINIC | Age: 72
End: 2022-01-06
Payer: MEDICARE

## 2022-01-10 ENCOUNTER — TELEPHONE (OUTPATIENT)
Dept: OPHTHALMOLOGY | Facility: CLINIC | Age: 72
End: 2022-01-10
Payer: MEDICARE

## 2022-01-20 RX ORDER — MOXIFLOXACIN HYDROCHLORIDE 400 MG/1
400 TABLET ORAL DAILY
Qty: 14 TABLET | Refills: 0 | Status: SHIPPED | OUTPATIENT
Start: 2022-01-20 | End: 2022-02-03

## 2022-01-20 RX ORDER — FLUCONAZOLE 100 MG/1
100 TABLET ORAL DAILY
Qty: 14 TABLET | Refills: 0 | Status: SHIPPED | OUTPATIENT
Start: 2022-01-20 | End: 2022-02-03

## 2022-01-21 ENCOUNTER — OFFICE VISIT (OUTPATIENT)
Dept: OPHTHALMOLOGY | Facility: CLINIC | Age: 72
End: 2022-01-21
Payer: MEDICARE

## 2022-01-21 ENCOUNTER — TELEPHONE (OUTPATIENT)
Dept: NEPHROLOGY | Facility: CLINIC | Age: 72
End: 2022-01-21
Payer: MEDICARE

## 2022-01-21 VITALS — HEART RATE: 84 BPM | DIASTOLIC BLOOD PRESSURE: 98 MMHG | SYSTOLIC BLOOD PRESSURE: 191 MMHG

## 2022-01-21 DIAGNOSIS — H25.11 AGE-RELATED NUCLEAR CATARACT OF RIGHT EYE: Primary | ICD-10-CM

## 2022-01-21 PROCEDURE — 1159F MED LIST DOCD IN RCRD: CPT | Mod: HCNC,CPTII,S$GLB, | Performed by: OPHTHALMOLOGY

## 2022-01-21 PROCEDURE — 3077F PR MOST RECENT SYSTOLIC BLOOD PRESSURE >= 140 MM HG: ICD-10-PCS | Mod: HCNC,CPTII,S$GLB, | Performed by: OPHTHALMOLOGY

## 2022-01-21 PROCEDURE — 99213 OFFICE O/P EST LOW 20 MIN: CPT | Mod: HCNC,S$GLB,, | Performed by: OPHTHALMOLOGY

## 2022-01-21 PROCEDURE — 99999 PR PBB SHADOW E&M-EST. PATIENT-LVL III: ICD-10-PCS | Mod: PBBFAC,HCNC,, | Performed by: OPHTHALMOLOGY

## 2022-01-21 PROCEDURE — 3080F DIAST BP >= 90 MM HG: CPT | Mod: HCNC,CPTII,S$GLB, | Performed by: OPHTHALMOLOGY

## 2022-01-21 PROCEDURE — 99213 PR OFFICE/OUTPT VISIT, EST, LEVL III, 20-29 MIN: ICD-10-PCS | Mod: HCNC,S$GLB,, | Performed by: OPHTHALMOLOGY

## 2022-01-21 PROCEDURE — 92136 IOL MASTER - OD - RIGHT EYE: ICD-10-PCS | Mod: HCNC,RT,S$GLB, | Performed by: OPHTHALMOLOGY

## 2022-01-21 PROCEDURE — 1160F RVW MEDS BY RX/DR IN RCRD: CPT | Mod: HCNC,CPTII,S$GLB, | Performed by: OPHTHALMOLOGY

## 2022-01-21 PROCEDURE — 1160F PR REVIEW ALL MEDS BY PRESCRIBER/CLIN PHARMACIST DOCUMENTED: ICD-10-PCS | Mod: HCNC,CPTII,S$GLB, | Performed by: OPHTHALMOLOGY

## 2022-01-21 PROCEDURE — 1159F PR MEDICATION LIST DOCUMENTED IN MEDICAL RECORD: ICD-10-PCS | Mod: HCNC,CPTII,S$GLB, | Performed by: OPHTHALMOLOGY

## 2022-01-21 PROCEDURE — 92136 OPHTHALMIC BIOMETRY: CPT | Mod: HCNC,RT,S$GLB, | Performed by: OPHTHALMOLOGY

## 2022-01-21 PROCEDURE — 3080F PR MOST RECENT DIASTOLIC BLOOD PRESSURE >= 90 MM HG: ICD-10-PCS | Mod: HCNC,CPTII,S$GLB, | Performed by: OPHTHALMOLOGY

## 2022-01-21 PROCEDURE — 3077F SYST BP >= 140 MM HG: CPT | Mod: HCNC,CPTII,S$GLB, | Performed by: OPHTHALMOLOGY

## 2022-01-21 PROCEDURE — 99999 PR PBB SHADOW E&M-EST. PATIENT-LVL III: CPT | Mod: PBBFAC,HCNC,, | Performed by: OPHTHALMOLOGY

## 2022-01-21 RX ORDER — DICLOFENAC SODIUM 1 MG/ML
1 SOLUTION/ DROPS OPHTHALMIC 4 TIMES DAILY
Qty: 5 ML | Refills: 3 | Status: SHIPPED | OUTPATIENT
Start: 2022-01-21 | End: 2022-02-20

## 2022-01-21 RX ORDER — PROPARACAINE HYDROCHLORIDE 5 MG/ML
1 SOLUTION/ DROPS OPHTHALMIC
Status: CANCELLED | OUTPATIENT
Start: 2022-01-21 | End: 2022-01-21

## 2022-01-21 RX ORDER — MOXIFLOXACIN 5 MG/ML
1 SOLUTION/ DROPS OPHTHALMIC 4 TIMES DAILY
Qty: 3 ML | Refills: 1 | Status: SHIPPED | OUTPATIENT
Start: 2022-01-21 | End: 2022-02-05

## 2022-01-21 RX ORDER — PHENYLEPHRINE HYDROCHLORIDE 25 MG/ML
1 SOLUTION/ DROPS OPHTHALMIC
Status: CANCELLED | OUTPATIENT
Start: 2022-01-21 | End: 2022-01-21

## 2022-01-21 RX ORDER — PREDNISOLONE ACETATE 10 MG/ML
1 SUSPENSION/ DROPS OPHTHALMIC 4 TIMES DAILY
Qty: 5 ML | Refills: 3 | Status: SHIPPED | OUTPATIENT
Start: 2022-01-21 | End: 2022-02-20

## 2022-01-21 RX ORDER — PHENYLEPHRINE HYDROCHLORIDE 100 MG/ML
1 SOLUTION/ DROPS OPHTHALMIC
Status: CANCELLED | OUTPATIENT
Start: 2022-01-21 | End: 2022-01-21

## 2022-01-21 RX ORDER — TROPICAMIDE 10 MG/ML
1 SOLUTION/ DROPS OPHTHALMIC
Status: CANCELLED | OUTPATIENT
Start: 2022-01-21 | End: 2022-01-21

## 2022-01-21 NOTE — TELEPHONE ENCOUNTER
I am seeing her in PD clinic this Tuesday. I will address her BP. Is there anything else that needs to be specifically addressed?

## 2022-01-21 NOTE — H&P
CC: H&P for cataract surgery  HPI: Patient has been diagnosed with cataracts, which are interfering with daily activities due to blurred vision and glare which cannot adequately be corrected with glasses.   PMH:  Past Medical History:   Diagnosis Date    Anemia of chronic disease 11/30/2015    Anticoagulant long-term use     Anxiety     Arthritis     Cataract     Chronic kidney disease     Dr. King    Chronic obstructive pulmonary disease 2/26/2018    Complex renal cyst     Depression     Encounter for blood transfusion     Fibromyalgia     GERD (gastroesophageal reflux disease)     Hypertension     Kidney stones     Pacemaker     Dr Vincent Rooney    Renal failure     Temporal arteritis        FH:  Family History   Problem Relation Age of Onset    Diabetes Mother     Macular degeneration Mother     Cancer Father         Brain    Heart disease Sister     Macular degeneration Sister     Diabetes Brother     Heart disease Brother 45    Colon cancer Brother 55    Cancer Brother         colon    Macular degeneration Brother     Diabetes Maternal Grandmother     Heart disease Maternal Grandfather     GI problems Grandchild         trying to determine if she has crohn's    Glaucoma Cousin     Crohn's disease Neg Hx     Ulcerative colitis Neg Hx     Esophageal cancer Neg Hx     Stomach cancer Neg Hx     Retinal detachment Neg Hx        SH:  Social History     Socioeconomic History    Marital status:    Tobacco Use    Smoking status: Former Smoker     Packs/day: 1.00     Years: 50.00     Pack years: 50.00     Types: Cigarettes     Quit date: 3/25/2018     Years since quitting: 3.8    Smokeless tobacco: Never Used   Substance and Sexual Activity    Alcohol use: No    Drug use: No    Sexual activity: Not Currently     Social Determinants of Health     Financial Resource Strain: Low Risk     Difficulty of Paying Living Expenses: Not hard at all   Food Insecurity: No Food  Insecurity    Worried About Running Out of Food in the Last Year: Never true    Ran Out of Food in the Last Year: Never true   Transportation Needs: No Transportation Needs    Lack of Transportation (Medical): No    Lack of Transportation (Non-Medical): No   Physical Activity: Insufficiently Active    Days of Exercise per Week: 1 day    Minutes of Exercise per Session: 20 min   Stress: No Stress Concern Present    Feeling of Stress : Only a little   Social Connections: Unknown    Frequency of Communication with Friends and Family: More than three times a week    Frequency of Social Gatherings with Friends and Family: Twice a week    Marital Status:    Housing Stability: Low Risk     Unable to Pay for Housing in the Last Year: No    Number of Places Lived in the Last Year: 1    Unstable Housing in the Last Year: No       ROS:  HEENT: Blurred vision  CV: No chest pain  Pulm: No SOB  Please see my last exam note for additional ROS details    PE:  Vitals:    01/21/22 1337   BP: (!) 191/98   Pulse: 84     HEENT: Cataract  CV: N S1 and S2 no murmurs  Pulm: CTAB wheezes, rales, or ronchi  Abd:  soft nabs NTND no masses  Extremeties: No edema    A/P  1. Cataract - Indications, risks and alternatives to the procedure were explained to the patient. The patient was given the opportunity to ask questions and consented to the procedure in writing.  Proceed with cataract surgery as scheduled.  2. Other health issues - patient has been cleared by their PCP. See last note for details.

## 2022-01-21 NOTE — H&P (VIEW-ONLY)
CC: H&P for cataract surgery  HPI: Patient has been diagnosed with cataracts, which are interfering with daily activities due to blurred vision and glare which cannot adequately be corrected with glasses.   PMH:  Past Medical History:   Diagnosis Date    Anemia of chronic disease 11/30/2015    Anticoagulant long-term use     Anxiety     Arthritis     Cataract     Chronic kidney disease     Dr. Knig    Chronic obstructive pulmonary disease 2/26/2018    Complex renal cyst     Depression     Encounter for blood transfusion     Fibromyalgia     GERD (gastroesophageal reflux disease)     Hypertension     Kidney stones     Pacemaker     Dr Vincent Rooney    Renal failure     Temporal arteritis        FH:  Family History   Problem Relation Age of Onset    Diabetes Mother     Macular degeneration Mother     Cancer Father         Brain    Heart disease Sister     Macular degeneration Sister     Diabetes Brother     Heart disease Brother 45    Colon cancer Brother 55    Cancer Brother         colon    Macular degeneration Brother     Diabetes Maternal Grandmother     Heart disease Maternal Grandfather     GI problems Grandchild         trying to determine if she has crohn's    Glaucoma Cousin     Crohn's disease Neg Hx     Ulcerative colitis Neg Hx     Esophageal cancer Neg Hx     Stomach cancer Neg Hx     Retinal detachment Neg Hx        SH:  Social History     Socioeconomic History    Marital status:    Tobacco Use    Smoking status: Former Smoker     Packs/day: 1.00     Years: 50.00     Pack years: 50.00     Types: Cigarettes     Quit date: 3/25/2018     Years since quitting: 3.8    Smokeless tobacco: Never Used   Substance and Sexual Activity    Alcohol use: No    Drug use: No    Sexual activity: Not Currently     Social Determinants of Health     Financial Resource Strain: Low Risk     Difficulty of Paying Living Expenses: Not hard at all   Food Insecurity: No Food  Insecurity    Worried About Running Out of Food in the Last Year: Never true    Ran Out of Food in the Last Year: Never true   Transportation Needs: No Transportation Needs    Lack of Transportation (Medical): No    Lack of Transportation (Non-Medical): No   Physical Activity: Insufficiently Active    Days of Exercise per Week: 1 day    Minutes of Exercise per Session: 20 min   Stress: No Stress Concern Present    Feeling of Stress : Only a little   Social Connections: Unknown    Frequency of Communication with Friends and Family: More than three times a week    Frequency of Social Gatherings with Friends and Family: Twice a week    Marital Status:    Housing Stability: Low Risk     Unable to Pay for Housing in the Last Year: No    Number of Places Lived in the Last Year: 1    Unstable Housing in the Last Year: No       ROS:  HEENT: Blurred vision  CV: No chest pain  Pulm: No SOB  Please see my last exam note for additional ROS details    PE:  Vitals:    01/21/22 1337   BP: (!) 191/98   Pulse: 84     HEENT: Cataract  CV: N S1 and S2 no murmurs  Pulm: CTAB wheezes, rales, or ronchi  Abd:  soft nabs NTND no masses  Extremeties: No edema    A/P  1. Cataract - Indications, risks and alternatives to the procedure were explained to the patient. The patient was given the opportunity to ask questions and consented to the procedure in writing.  Proceed with cataract surgery as scheduled.  2. Other health issues - patient has been cleared by their PCP. See last note for details.
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
## 2022-01-25 RX ORDER — VALSARTAN 320 MG/1
160 TABLET ORAL DAILY
Qty: 90 TABLET | Refills: 1 | Status: SHIPPED | OUTPATIENT
Start: 2022-01-25 | End: 2022-02-23 | Stop reason: SDUPTHER

## 2022-02-01 ENCOUNTER — TELEPHONE (OUTPATIENT)
Dept: OPHTHALMOLOGY | Facility: CLINIC | Age: 72
End: 2022-02-01
Payer: MEDICARE
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
## 2022-02-02 ENCOUNTER — TELEPHONE (OUTPATIENT)
Dept: GASTROENTEROLOGY | Facility: CLINIC | Age: 72
End: 2022-02-02
Payer: MEDICARE

## 2022-02-02 NOTE — TELEPHONE ENCOUNTER
----- Message from Ba Shen MD sent at 2/2/2022 12:49 PM CST -----  Regarding: FW: Gene mutation  Yes, thanks.  ----- Message -----  From: Komal Delgado LPN  Sent: 2/2/2022  12:44 PM CST  To: Ba Shen MD  Subject: FW: Gene mutation                                Do you want me to put this in her chart so we know its being done sooner than 10 yr?  ----- Message -----  From: Ba Shen MD  Sent: 2/2/2022  12:37 PM CST  To: Komal Delgado LPN  Subject: FW: Gene mutation                                Change f/u C-scope to 5 years from last exam, which would be due 3/2023.  ----- Message -----  From: Ulices Gaitan MD  Sent: 4/21/2021  10:21 AM CST  To: Ba Shen MD  Subject: Gene mutation                                    Hello,  I have a patient who is asking me what she needs to do next regarding a gene mutation her sister tested positive for.    Sister diagnosed with [id number] gene mutation, heterozygous.  Was told to have her family tested because increase risk of colon cancer.  Sister had polyps removed.  Father had gastric cancer at 54yo (passed).  Brother colon cancer 48 yo.    She had a normal colonoscopy in 2018.      The test did not pull up for me, so I guess I would need to do it as a miscellaneous test if required.  With her normal colonoscopy, would this even be necessary to test for?  If you recommend her getting tested and she is positive, would we repeat her colonoscopy in 5 years instead of 10 years?    Any help would be appreciated.  Thank you,  Ulices

## 2022-02-05 ENCOUNTER — LAB VISIT (OUTPATIENT)
Dept: FAMILY MEDICINE | Facility: CLINIC | Age: 72
End: 2022-02-05
Payer: MEDICARE

## 2022-02-05 DIAGNOSIS — H25.11 AGE-RELATED NUCLEAR CATARACT OF RIGHT EYE: ICD-10-CM

## 2022-02-05 PROCEDURE — U0005 INFEC AGEN DETEC AMPLI PROBE: HCPCS | Performed by: OPHTHALMOLOGY

## 2022-02-05 PROCEDURE — U0003 INFECTIOUS AGENT DETECTION BY NUCLEIC ACID (DNA OR RNA); SEVERE ACUTE RESPIRATORY SYNDROME CORONAVIRUS 2 (SARS-COV-2) (CORONAVIRUS DISEASE [COVID-19]), AMPLIFIED PROBE TECHNIQUE, MAKING USE OF HIGH THROUGHPUT TECHNOLOGIES AS DESCRIBED BY CMS-2020-01-R: HCPCS | Performed by: OPHTHALMOLOGY

## 2022-02-06 LAB
SARS-COV-2 RNA RESP QL NAA+PROBE: NOT DETECTED
SARS-COV-2- CYCLE NUMBER: NORMAL

## 2022-02-07 ENCOUNTER — ANESTHESIA EVENT (OUTPATIENT)
Dept: SURGERY | Facility: HOSPITAL | Age: 72
End: 2022-02-07
Payer: MEDICARE

## 2022-02-08 ENCOUNTER — HOSPITAL ENCOUNTER (OUTPATIENT)
Facility: HOSPITAL | Age: 72
Discharge: HOME | End: 2022-02-08
Attending: OPHTHALMOLOGY | Admitting: OPHTHALMOLOGY
Payer: MEDICARE

## 2022-02-08 ENCOUNTER — ANESTHESIA (OUTPATIENT)
Dept: SURGERY | Facility: HOSPITAL | Age: 72
End: 2022-02-08
Payer: MEDICARE

## 2022-02-08 VITALS
HEART RATE: 80 BPM | HEIGHT: 62 IN | WEIGHT: 170 LBS | TEMPERATURE: 98 F | DIASTOLIC BLOOD PRESSURE: 98 MMHG | BODY MASS INDEX: 31.28 KG/M2 | SYSTOLIC BLOOD PRESSURE: 136 MMHG | RESPIRATION RATE: 14 BRPM | OXYGEN SATURATION: 96 %

## 2022-02-08 DIAGNOSIS — H25.11 AGE-RELATED NUCLEAR CATARACT OF RIGHT EYE: Primary | ICD-10-CM

## 2022-02-08 PROCEDURE — D9220A PRA ANESTHESIA: ICD-10-PCS | Mod: HCNC,CRNA,, | Performed by: NURSE ANESTHETIST, CERTIFIED REGISTERED

## 2022-02-08 PROCEDURE — 36000706: Mod: HCNC,PO | Performed by: OPHTHALMOLOGY

## 2022-02-08 PROCEDURE — D9220A PRA ANESTHESIA: Mod: HCNC,ANES,, | Performed by: ANESTHESIOLOGY

## 2022-02-08 PROCEDURE — D9220A PRA ANESTHESIA: ICD-10-PCS | Mod: HCNC,ANES,, | Performed by: ANESTHESIOLOGY

## 2022-02-08 PROCEDURE — 36000707: Mod: HCNC,PO | Performed by: OPHTHALMOLOGY

## 2022-02-08 PROCEDURE — 71000015 HC POSTOP RECOV 1ST HR: Mod: HCNC,PO | Performed by: OPHTHALMOLOGY

## 2022-02-08 PROCEDURE — 25000003 PHARM REV CODE 250: Mod: HCNC,PO | Performed by: OPHTHALMOLOGY

## 2022-02-08 PROCEDURE — 25000003 PHARM REV CODE 250: Mod: HCNC,PO

## 2022-02-08 PROCEDURE — D9220A PRA ANESTHESIA: Mod: HCNC,CRNA,, | Performed by: NURSE ANESTHETIST, CERTIFIED REGISTERED

## 2022-02-08 PROCEDURE — 63600175 PHARM REV CODE 636 W HCPCS: Mod: HCNC,PO | Performed by: NURSE ANESTHETIST, CERTIFIED REGISTERED

## 2022-02-08 PROCEDURE — 66984 XCAPSL CTRC RMVL W/O ECP: CPT | Mod: RT,,, | Performed by: OPHTHALMOLOGY

## 2022-02-08 PROCEDURE — 25000003 PHARM REV CODE 250: Mod: HCNC,PO | Performed by: NURSE ANESTHETIST, CERTIFIED REGISTERED

## 2022-02-08 PROCEDURE — 25000003 PHARM REV CODE 250: Mod: HCNC,PO | Performed by: ANESTHESIOLOGY

## 2022-02-08 PROCEDURE — 37000008 HC ANESTHESIA 1ST 15 MINUTES: Mod: HCNC,PO | Performed by: OPHTHALMOLOGY

## 2022-02-08 PROCEDURE — V2632 POST CHMBR INTRAOCULAR LENS: HCPCS | Mod: HCNC,PO | Performed by: OPHTHALMOLOGY

## 2022-02-08 PROCEDURE — 63600175 PHARM REV CODE 636 W HCPCS: Mod: HCNC,PO | Performed by: OPHTHALMOLOGY

## 2022-02-08 PROCEDURE — 66984 PR REMOVAL, CATARACT, W/INSRT INTRAOC LENS, W/O ENDO CYCLO: ICD-10-PCS | Mod: RT,,, | Performed by: OPHTHALMOLOGY

## 2022-02-08 PROCEDURE — 37000009 HC ANESTHESIA EA ADD 15 MINS: Mod: HCNC,PO | Performed by: OPHTHALMOLOGY

## 2022-02-08 PROCEDURE — 63600175 PHARM REV CODE 636 W HCPCS: Mod: HCNC,PO | Performed by: ANESTHESIOLOGY

## 2022-02-08 DEVICE — LENS EYHANCE +22.0D: Type: IMPLANTABLE DEVICE | Site: EYE | Status: FUNCTIONAL

## 2022-02-08 RX ORDER — MIDAZOLAM HYDROCHLORIDE 1 MG/ML
INJECTION, SOLUTION INTRAMUSCULAR; INTRAVENOUS
Status: DISCONTINUED | OUTPATIENT
Start: 2022-02-08 | End: 2022-02-08

## 2022-02-08 RX ORDER — FENTANYL CITRATE 50 UG/ML
INJECTION, SOLUTION INTRAMUSCULAR; INTRAVENOUS
Status: DISCONTINUED | OUTPATIENT
Start: 2022-02-08 | End: 2022-02-08

## 2022-02-08 RX ORDER — LIDOCAINE HYDROCHLORIDE 40 MG/ML
INJECTION, SOLUTION RETROBULBAR
Status: DISCONTINUED | OUTPATIENT
Start: 2022-02-08 | End: 2022-02-08

## 2022-02-08 RX ORDER — TROPICAMIDE 10 MG/ML
1 SOLUTION/ DROPS OPHTHALMIC
Status: COMPLETED | OUTPATIENT
Start: 2022-02-08 | End: 2022-02-08

## 2022-02-08 RX ORDER — OFLOXACIN 3 MG/ML
SOLUTION/ DROPS OPHTHALMIC
Status: DISCONTINUED | OUTPATIENT
Start: 2022-02-08 | End: 2022-02-08 | Stop reason: HOSPADM

## 2022-02-08 RX ORDER — SODIUM CHLORIDE, SODIUM LACTATE, POTASSIUM CHLORIDE, CALCIUM CHLORIDE 600; 310; 30; 20 MG/100ML; MG/100ML; MG/100ML; MG/100ML
INJECTION, SOLUTION INTRAVENOUS CONTINUOUS
Status: DISCONTINUED | OUTPATIENT
Start: 2022-02-08 | End: 2022-02-08 | Stop reason: HOSPADM

## 2022-02-08 RX ORDER — PROPARACAINE HYDROCHLORIDE 5 MG/ML
1 SOLUTION/ DROPS OPHTHALMIC
Status: COMPLETED | OUTPATIENT
Start: 2022-02-08 | End: 2022-02-08

## 2022-02-08 RX ORDER — HYDROMORPHONE HYDROCHLORIDE 2 MG/ML
0.2 INJECTION, SOLUTION INTRAMUSCULAR; INTRAVENOUS; SUBCUTANEOUS EVERY 5 MIN PRN
Status: DISCONTINUED | OUTPATIENT
Start: 2022-02-08 | End: 2022-02-08 | Stop reason: HOSPADM

## 2022-02-08 RX ORDER — LIDOCAINE HYDROCHLORIDE 10 MG/ML
1 INJECTION, SOLUTION EPIDURAL; INFILTRATION; INTRACAUDAL; PERINEURAL ONCE
Status: DISCONTINUED | OUTPATIENT
Start: 2022-02-08 | End: 2022-02-08 | Stop reason: HOSPADM

## 2022-02-08 RX ORDER — HYDRALAZINE HYDROCHLORIDE 20 MG/ML
5 INJECTION INTRAMUSCULAR; INTRAVENOUS ONCE
Status: COMPLETED | OUTPATIENT
Start: 2022-02-08 | End: 2022-02-08

## 2022-02-08 RX ORDER — EPINEPHRINE 1 MG/ML
INJECTION INTRAMUSCULAR; INTRAVENOUS; SUBCUTANEOUS
Status: DISCONTINUED | OUTPATIENT
Start: 2022-02-08 | End: 2022-02-08 | Stop reason: HOSPADM

## 2022-02-08 RX ORDER — LIDOCAINE HYDROCHLORIDE 10 MG/ML
INJECTION INFILTRATION; PERINEURAL
Status: DISCONTINUED | OUTPATIENT
Start: 2022-02-08 | End: 2022-02-08 | Stop reason: HOSPADM

## 2022-02-08 RX ORDER — FENTANYL CITRATE 50 UG/ML
25 INJECTION, SOLUTION INTRAMUSCULAR; INTRAVENOUS EVERY 5 MIN PRN
Status: DISCONTINUED | OUTPATIENT
Start: 2022-02-08 | End: 2022-02-08 | Stop reason: HOSPADM

## 2022-02-08 RX ORDER — PHENYLEPHRINE HYDROCHLORIDE 100 MG/ML
1 SOLUTION/ DROPS OPHTHALMIC
Status: COMPLETED | OUTPATIENT
Start: 2022-02-08 | End: 2022-02-08

## 2022-02-08 RX ORDER — PHENYLEPHRINE HYDROCHLORIDE 25 MG/ML
1 SOLUTION/ DROPS OPHTHALMIC
Status: COMPLETED | OUTPATIENT
Start: 2022-02-08 | End: 2022-02-08

## 2022-02-08 RX ORDER — CLONIDINE HYDROCHLORIDE 0.1 MG/1
0.1 TABLET ORAL ONCE
Status: COMPLETED | OUTPATIENT
Start: 2022-02-08 | End: 2022-02-08

## 2022-02-08 RX ORDER — OXYCODONE HYDROCHLORIDE 5 MG/1
5 TABLET ORAL
Status: DISCONTINUED | OUTPATIENT
Start: 2022-02-08 | End: 2022-02-08 | Stop reason: HOSPADM

## 2022-02-08 RX ADMIN — PHENYLEPHRINE HYDROCHLORIDE 1 DROP: 100 SOLUTION/ DROPS OPHTHALMIC at 07:02

## 2022-02-08 RX ADMIN — SODIUM CHLORIDE: 9 INJECTION, SOLUTION INTRAVENOUS at 07:02

## 2022-02-08 RX ADMIN — FENTANYL CITRATE 25 MCG: 50 INJECTION, SOLUTION INTRAMUSCULAR; INTRAVENOUS at 08:02

## 2022-02-08 RX ADMIN — PHENYLEPHRINE HYDROCHLORIDE 1 DROP: 25 SOLUTION/ DROPS OPHTHALMIC at 07:02

## 2022-02-08 RX ADMIN — PROPARACAINE HYDROCHLORIDE 1 DROP: 5 SOLUTION/ DROPS OPHTHALMIC at 07:02

## 2022-02-08 RX ADMIN — HYDRALAZINE HYDROCHLORIDE 5 MG: 20 INJECTION INTRAMUSCULAR; INTRAVENOUS at 07:02

## 2022-02-08 RX ADMIN — LIDOCAINE HYDROCHLORIDE 0.2 DROP: 40 INJECTION, SOLUTION RETROBULBAR; TOPICAL at 07:02

## 2022-02-08 RX ADMIN — TROPICAMIDE 1 DROP: 10 SOLUTION/ DROPS OPHTHALMIC at 07:02

## 2022-02-08 RX ADMIN — LIDOCAINE HYDROCHLORIDE 0.2 DROP: 40 INJECTION, SOLUTION RETROBULBAR; TOPICAL at 08:02

## 2022-02-08 RX ADMIN — MIDAZOLAM HYDROCHLORIDE 1 MG: 1 INJECTION, SOLUTION INTRAMUSCULAR; INTRAVENOUS at 07:02

## 2022-02-08 RX ADMIN — CLONIDINE HYDROCHLORIDE 0.1 MG: 0.1 TABLET ORAL at 06:02

## 2022-02-08 NOTE — ANESTHESIA POSTPROCEDURE EVALUATION
Anesthesia Post Evaluation    Patient: Amanda Smith    Procedure(s) Performed: Procedure(s) (LRB):  PHACOEMULSIFICATION, CATARACT (Right)    Final Anesthesia Type: MAC      Patient location during evaluation: PACU  Patient participation: Yes- Able to Participate  Level of consciousness: awake  Post-procedure vital signs: reviewed and stable  Pain management: adequate  Airway patency: patent    PONV status at discharge: No PONV  Anesthetic complications: no      Cardiovascular status: blood pressure returned to baseline and hypertensive  Respiratory status: spontaneous ventilation  Hydration status: euvolemic  Follow-up not needed.          Vitals Value Taken Time   /98 02/08/22 0845   Temp 36.5 °C (97.7 °F) 02/08/22 0825   Pulse 80 02/08/22 0845   Resp 14 02/08/22 0845   SpO2 96 % 02/08/22 0845         Event Time   Out of Recovery 08:26:00         Pain/Yue Score: Yue Score: 10 (2/8/2022  8:45 AM)

## 2022-02-08 NOTE — OP NOTE
Date of surgery: 2/8/2022    Operative Report    Preoperative Diagnosis: Nuclear sclerosis, right eye    Postoperative Diagnosis: Nuclear sclerosis, right eye    Procedure: Phacoemulsification with posterior chamber intraocular lens, right eye    Surgeon: Miguel Angel Rasmussen Jr. M.D.    Anesthesia: MAC with topical     Brief Preoperative Note:  The patient was seen in the office with cataract of the right eye.  Because of the impairment of the patient's reading, driving, ambulation, and other activities of daily living needing a good quality of vision, the patient elected to remove the cataract and place a acrylic lens implant, if possible    Procedure in detail:    Informed consent was obtained. Indications, risks and alternatives to the procedure were explained to the patient. The patient was given the opportunity to ask questions and consented to the procedure in writing. In the preoperative holding area, the patients identity and operative site were confirmed.  Topical anesthetic was administered, consisting of alternating 0.5% Proparacaine and 4% preservative-free Lidocaine Q 5 minutes times 3.  The patient was taken to the operative suite.  A time-out was performed.  The right eye was prepped with 5% Betadine and draped in sterile fashion.  A lid speculum was placed in the right eye.  The temporal clear corneal incision was developed using a LRI jayla knife and crescent blade for a 3 plane incision.  A paracentesis was done with a 1mm jayla blade.  Before instillation of the Viscoat, approximately 0.1 to 0.3cc of diluted preservative free intracameral lidocaine was instilled.  Viscoat was injected into the anterior chamber with a 27-gauge needle.  A 2.4mm jayla blade was used to make a temporal clear corneal incision.  Afterwards, a cystotome was used to perform a continuous curvilinear capsulorrhexis with the assistance of utrata forceps.  Hydrodissection was performed using balanced salt  solution,injected under the anterior capsule using a andrade cannula and hydrodelineation was performed using a blunt-tipped cannula.  Next, phacoemulsification performed in a divide and conquer fashion with the use of a nucleus rotator to turn the lens and protect the posterior capsule.  Cortical material was then removed using irrigation and aspiration.  Provisc was then injected into the anterior chamber and into capsular bag to inflate the bag for the placement of the lens implant  and then an J&J DIBOO 22.0 D lens was injected into the capsular bag and rotated in position with the assistance of a Sinsky hook.  Irrigation and aspiration were used to remove the remaining viscoelastic. Next, Miochol was injected into the anterior chamber and the pupil was allowed to constrict in a symmetrical fashion. Wound hydrated with BSS.  A collagen shield was placed into the eye and the eye was covered with a Alvarez shield.  The patient tolerated the procedure well and was transferred to the recovery area in good condition.  Estimated blood loss:  none  Specimens:   none  Complications:  none  Disposition:   stable to PACU

## 2022-02-08 NOTE — BRIEF OP NOTE
Operative Note     SUMMARY     Surgery Date: 2/8/2022     Surgeon(s) and Role:     * Miguel Angel Rasmussen Jr., MD - Primary    Pre-op Diagnosis:  Age-related nuclear cataract of right eye [H25.11]    Post-op Diagnosis:  Age-related nuclear cataract of right eye [H25.11]    Procedure(s) (LRB):  PHACOEMULSIFICATION, CATARACT (Right)    Anesthesia: Monitor Anesthesia Care    Findings/Key Components:  Same as post op diagnosis    Estimated Blood Loss: * No values recorded between 2/8/2022  8:04 AM and 2/8/2022  8:24 AM *         Specimens (From admission, onward)    None            Discharge Note      SUMMARY     Admit Date: 2/8/2022    Attending Physician: Miguel Angel Rasmussen Jr., MD     Discharge Physician: Miguel Angel Rasmussen Jr., MD    Discharge Date: 2/8/2022     Final Diagnosis: Age-related nuclear cataract of right eye [H25.11]    Hospital Course: The patient was admitted for outpatient surgery and tolerated the procedure well. The patient was discharged home in stable condition the same day.    Diet: Advance as tolerated    Follow-Up: Follow up with Dr. Rasmussen, next day, as previously scheduled  Activity as tolerated.      Activity: Per Handout Instructions    Disposition: Home or self care    Patient Instructions:   Current Discharge Medication List      CONTINUE these medications which have NOT CHANGED    Details   aspirin (ECOTRIN) 81 MG EC tablet Take 81 mg by mouth once daily.      atorvastatin (LIPITOR) 20 MG tablet Take 1 tablet (20 mg total) by mouth once daily.  Qty: 90 tablet, Refills: 3    Associated Diagnoses: Dyslipidemia      BISACODYL ORAL Take 10 mg by mouth daily as needed (constipation).      calcitRIOL (ROCALTROL) 0.25 MCG Cap TAKE 1 CAPSULE(0.25 MCG) BY MOUTH EVERY DAY  Qty: 90 capsule, Refills: 3    Comments: **Patient requests 90 days supply**      calcium acetate,phosphat bind, (PHOSLO) 667 mg capsule One po with meals and large snacks  Qty: 450 capsule, Refills: 3      carvediloL (COREG) 25 MG  tablet One po BID  Qty: 180 tablet, Refills: 3    Comments: .      cloNIDine (CATAPRES) 0.1 MG tablet Take 1 tablet (0.1 mg total) by mouth 3 (three) times daily as needed (BP > 160/100).  Qty: 60 tablet, Refills: 11    Comments: .  Associated Diagnoses: Essential hypertension      diclofenac (VOLTAREN) 0.1 % ophthalmic solution Place 1 drop into the right eye 4 (four) times daily. Start drops 3 days before surgery  Qty: 5 mL, Refills: 3    Associated Diagnoses: Age-related nuclear cataract of right eye      famotidine (PEPCID) 20 MG tablet Take 20 mg by mouth 2 (two) times daily as needed.       fluconazole (DIFLUCAN) 100 MG tablet One po QD X 2 days for yeast infection  Qty: 2 tablet, Refills: 1      fluticasone propionate (FLONASE) 50 mcg/actuation nasal spray 1 spray by Each Nostril route daily as needed for Allergies or Rhinitis.      HYDROcodone-acetaminophen (NORCO)  mg per tablet Take 1 tablet by mouth every 6 (six) hours as needed for Pain.  Qty: 10 tablet, Refills: 0    Comments: Quantity prescribed more than 7 day supply? No      mupirocin (BACTROBAN) 2 % ointment Apply to PD catheter exit site daily  Qty: 30 g, Refills: 11      !! NIFEdipine (PROCARDIA-XL) 90 MG (OSM) 24 hr tablet Take 1 tablet (90 mg total) by mouth every evening.  Qty: 90 tablet, Refills: 1    Comments: Increase dose  Associated Diagnoses: Essential hypertension      ondansetron (ZOFRAN) 4 MG tablet Take 1 tablet (4 mg total) by mouth every 8 (eight) hours as needed for Nausea.  Qty: 45 tablet, Refills: 1      oxyCODONE-acetaminophen (PERCOCET)  mg per tablet Take 1 tablet by mouth every 4 (four) hours as needed for Pain.      pantoprazole (PROTONIX) 40 MG tablet Take 1 tablet (40 mg total) by mouth once daily. For stomach acid  Qty: 90 tablet, Refills: 1      prednisoLONE acetate (PRED FORTE) 1 % DrpS Place 1 drop into the right eye 4 (four) times daily.  Qty: 5 mL, Refills: 3    Associated Diagnoses: Age-related nuclear  cataract of right eye      promethazine (PHENERGAN) 25 MG tablet Take 1 tablet (25 mg total) by mouth every 6 (six) hours as needed for Nausea.  Qty: 45 tablet, Refills: 6    Associated Diagnoses: Chronic low back pain, unspecified back pain laterality, unspecified whether sciatica present; Nausea      sennosides (LAXATIVE, SENNOSIDES,) 25 mg Tab Take 25 mg by mouth daily as needed (constipation).      !! sertraline (ZOLOFT) 50 MG tablet TAKE 1 TABLET(50 MG) BY MOUTH EVERY DAY  Qty: 90 tablet, Refills: 1    Associated Diagnoses: Depression, unspecified depression type      !! sertraline (ZOLOFT) 50 MG tablet TAKE 1 TABLET(50 MG) BY MOUTH EVERY DAY  Qty: 90 tablet, Refills: 1    Associated Diagnoses: Depression, unspecified depression type      tiZANidine (ZANAFLEX) 4 MG tablet Take 4 mg by mouth every 6 (six) hours as needed (muscle spasm).      torsemide (DEMADEX) 20 MG Tab Take 2 tablets (40 mg total) by mouth every morning.  Qty: 180 tablet, Refills: 1    Comments: .      traZODone (DESYREL) 100 MG tablet TAKE 2 TABLETS(200 MG) BY MOUTH EVERY NIGHT AS NEEDED FOR INSOMNIA  Qty: 180 tablet, Refills: 2    Associated Diagnoses: Insomnia, unspecified type      valsartan (DIOVAN) 320 MG tablet Take 0.5 tablets (160 mg total) by mouth once daily. For BP and kidneys  Qty: 90 tablet, Refills: 1    Comments: Increase dose      EPINEPHrine 0.3 mg/0.3 mL Syrg Inject 1 each into the muscle once as needed (anaphylactic reaction).      levocetirizine (XYZAL) 5 MG tablet TAKE 1 TABLET(5 MG) BY MOUTH EVERY EVENING  Qty: 90 tablet, Refills: 3      montelukast (SINGULAIR) 10 mg tablet Take 10 mg by mouth nightly as needed. Alternates with xyzal  Refills: 6      !! NIFEdipine (PROCARDIA-XL) 60 MG (OSM) 24 hr tablet TAKE 1 TABLET(60 MG) BY MOUTH EVERY EVENING  Qty: 90 tablet, Refills: 1    Associated Diagnoses: Essential hypertension       !! - Potential duplicate medications found. Please discuss with provider.          Discharge  Procedure Orders (must include Diet, Follow-up, Activity)   Discharge Procedure Orders (must include Diet, Follow-up, Activity)   Diet general     Lifting restrictions     Call MD for:  persistent nausea and vomiting     Call MD for:  severe uncontrolled pain     Leave dressing on - Keep it clean, dry, and intact until clinic visit     Activity as tolerated

## 2022-02-08 NOTE — INTERVAL H&P NOTE
"See PCP H&P. No changes noted. Heart and lungs per anesthesia. "This patient has been cleared for surgery in an ambulatory surgery center/facility."   "
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
## 2022-02-08 NOTE — TRANSFER OF CARE
"Anesthesia Transfer of Care Note    Patient: Amanda Smith    Procedure(s) Performed: Procedure(s) (LRB):  PHACOEMULSIFICATION, CATARACT (Right)    Patient location: PACU    Anesthesia Type: MAC    Transport from OR: Transported from OR on room air with adequate spontaneous ventilation    Post pain: adequate analgesia    Post assessment: no apparent anesthetic complications and tolerated procedure well    Post vital signs: stable    Level of consciousness: awake, alert and oriented    Nausea/Vomiting: no nausea/vomiting    Complications: none    Transfer of care protocol was followed      Last vitals:   Visit Vitals  BP (!) 225/98   Pulse 85   Temp 36.4 °C (97.5 °F) (Skin)   Resp 18   Ht 5' 2" (1.575 m)   Wt 77.1 kg (170 lb)   SpO2 99%   Breastfeeding No   BMI 31.09 kg/m²     "

## 2022-02-09 ENCOUNTER — OFFICE VISIT (OUTPATIENT)
Dept: OPHTHALMOLOGY | Facility: CLINIC | Age: 72
End: 2022-02-09
Payer: MEDICARE

## 2022-02-09 DIAGNOSIS — Z98.41 STATUS POST CATARACT EXTRACTION AND INSERTION OF INTRAOCULAR LENS OF RIGHT EYE: Primary | ICD-10-CM

## 2022-02-09 DIAGNOSIS — Z96.1 STATUS POST CATARACT EXTRACTION AND INSERTION OF INTRAOCULAR LENS OF RIGHT EYE: Primary | ICD-10-CM

## 2022-02-09 PROCEDURE — 99024 POSTOP FOLLOW-UP VISIT: CPT | Mod: HCNC,S$GLB,, | Performed by: OPHTHALMOLOGY

## 2022-02-09 PROCEDURE — 3288F PR FALLS RISK ASSESSMENT DOCUMENTED: ICD-10-PCS | Mod: HCNC,CPTII,S$GLB, | Performed by: OPHTHALMOLOGY

## 2022-02-09 PROCEDURE — 4010F PR ACE/ARB THEARPY RXD/TAKEN: ICD-10-PCS | Mod: HCNC,CPTII,S$GLB, | Performed by: OPHTHALMOLOGY

## 2022-02-09 PROCEDURE — 3288F FALL RISK ASSESSMENT DOCD: CPT | Mod: HCNC,CPTII,S$GLB, | Performed by: OPHTHALMOLOGY

## 2022-02-09 PROCEDURE — 1160F PR REVIEW ALL MEDS BY PRESCRIBER/CLIN PHARMACIST DOCUMENTED: ICD-10-PCS | Mod: HCNC,CPTII,S$GLB, | Performed by: OPHTHALMOLOGY

## 2022-02-09 PROCEDURE — 99024 PR POST-OP FOLLOW-UP VISIT: ICD-10-PCS | Mod: HCNC,S$GLB,, | Performed by: OPHTHALMOLOGY

## 2022-02-09 PROCEDURE — 1160F RVW MEDS BY RX/DR IN RCRD: CPT | Mod: HCNC,CPTII,S$GLB, | Performed by: OPHTHALMOLOGY

## 2022-02-09 PROCEDURE — 1101F PR PT FALLS ASSESS DOC 0-1 FALLS W/OUT INJ PAST YR: ICD-10-PCS | Mod: HCNC,CPTII,S$GLB, | Performed by: OPHTHALMOLOGY

## 2022-02-09 PROCEDURE — 1101F PT FALLS ASSESS-DOCD LE1/YR: CPT | Mod: HCNC,CPTII,S$GLB, | Performed by: OPHTHALMOLOGY

## 2022-02-09 PROCEDURE — 4010F ACE/ARB THERAPY RXD/TAKEN: CPT | Mod: HCNC,CPTII,S$GLB, | Performed by: OPHTHALMOLOGY

## 2022-02-09 PROCEDURE — 1126F AMNT PAIN NOTED NONE PRSNT: CPT | Mod: HCNC,CPTII,S$GLB, | Performed by: OPHTHALMOLOGY

## 2022-02-09 PROCEDURE — 99999 PR PBB SHADOW E&M-EST. PATIENT-LVL III: CPT | Mod: PBBFAC,HCNC,, | Performed by: OPHTHALMOLOGY

## 2022-02-09 PROCEDURE — 1159F PR MEDICATION LIST DOCUMENTED IN MEDICAL RECORD: ICD-10-PCS | Mod: HCNC,CPTII,S$GLB, | Performed by: OPHTHALMOLOGY

## 2022-02-09 PROCEDURE — 1159F MED LIST DOCD IN RCRD: CPT | Mod: HCNC,CPTII,S$GLB, | Performed by: OPHTHALMOLOGY

## 2022-02-09 PROCEDURE — 99999 PR PBB SHADOW E&M-EST. PATIENT-LVL III: ICD-10-PCS | Mod: PBBFAC,HCNC,, | Performed by: OPHTHALMOLOGY

## 2022-02-09 PROCEDURE — 1126F PR PAIN SEVERITY QUANTIFIED, NO PAIN PRESENT: ICD-10-PCS | Mod: HCNC,CPTII,S$GLB, | Performed by: OPHTHALMOLOGY

## 2022-02-09 NOTE — PROGRESS NOTES
HPI     1 day post op OD done 2/8     Pt states, she is in some discomfort today. Va stable. Pt understands how   to administer sx gtts: pred, diclo, viga x4 OD.     Last edited by Devin Ambrocio on 2/9/2022  8:33 AM. (History)        ROS     Negative for: Constitutional, Gastrointestinal, Neurological, Skin,   Genitourinary, Musculoskeletal, HENT, Endocrine, Cardiovascular, Eyes,   Respiratory, Psychiatric, Allergic/Imm, Heme/Lymph    Last edited by Miguel Angel Rasmussen Jr., MD on 2/9/2022  8:54 AM. (History)        Assessment /Plan     For exam results, see Encounter Report.    Status post cataract extraction and insertion of intraocular lens of right eye      Prednisolone acetate (pink or white top drop) 1 drop 4x daily left eye; shake well before using drop  Vigamox 1 drop 4x daily left eye (tan top)  Diclofenac 1 drop 4x daily left eye (gray top)  No bending no lifting  Keep head elevated when laying down  Keep dry   F/u 1 week

## 2022-02-18 ENCOUNTER — OFFICE VISIT (OUTPATIENT)
Dept: OPHTHALMOLOGY | Facility: CLINIC | Age: 72
End: 2022-02-18
Payer: MEDICARE

## 2022-02-18 DIAGNOSIS — H25.12 AGE-RELATED NUCLEAR CATARACT, LEFT EYE: ICD-10-CM

## 2022-02-18 DIAGNOSIS — H25.12 AGE-RELATED NUCLEAR CATARACT OF LEFT EYE: ICD-10-CM

## 2022-02-18 DIAGNOSIS — Z98.41 STATUS POST CATARACT EXTRACTION AND INSERTION OF INTRAOCULAR LENS OF RIGHT EYE: Primary | ICD-10-CM

## 2022-02-18 DIAGNOSIS — Z96.1 STATUS POST CATARACT EXTRACTION AND INSERTION OF INTRAOCULAR LENS OF RIGHT EYE: Primary | ICD-10-CM

## 2022-02-18 PROCEDURE — 99214 OFFICE O/P EST MOD 30 MIN: CPT | Mod: PBBFAC,PO | Performed by: OPHTHALMOLOGY

## 2022-02-18 PROCEDURE — 1126F PR PAIN SEVERITY QUANTIFIED, NO PAIN PRESENT: ICD-10-PCS | Mod: HCNC,CPTII,S$GLB, | Performed by: OPHTHALMOLOGY

## 2022-02-18 PROCEDURE — 1160F RVW MEDS BY RX/DR IN RCRD: CPT | Mod: HCNC,CPTII,S$GLB, | Performed by: OPHTHALMOLOGY

## 2022-02-18 PROCEDURE — 92136 IOL MASTER - OS - LEFT EYE: ICD-10-PCS | Mod: HCNC,LT,S$GLB, | Performed by: OPHTHALMOLOGY

## 2022-02-18 PROCEDURE — 99999 PR PBB SHADOW E&M-EST. PATIENT-LVL IV: CPT | Mod: PBBFAC,,, | Performed by: OPHTHALMOLOGY

## 2022-02-18 PROCEDURE — 99024 POSTOP FOLLOW-UP VISIT: CPT | Mod: HCNC,S$GLB,, | Performed by: OPHTHALMOLOGY

## 2022-02-18 PROCEDURE — 1159F PR MEDICATION LIST DOCUMENTED IN MEDICAL RECORD: ICD-10-PCS | Mod: HCNC,CPTII,S$GLB, | Performed by: OPHTHALMOLOGY

## 2022-02-18 PROCEDURE — 1159F MED LIST DOCD IN RCRD: CPT | Mod: HCNC,CPTII,S$GLB, | Performed by: OPHTHALMOLOGY

## 2022-02-18 PROCEDURE — 3288F FALL RISK ASSESSMENT DOCD: CPT | Mod: HCNC,CPTII,S$GLB, | Performed by: OPHTHALMOLOGY

## 2022-02-18 PROCEDURE — 1101F PT FALLS ASSESS-DOCD LE1/YR: CPT | Mod: HCNC,CPTII,S$GLB, | Performed by: OPHTHALMOLOGY

## 2022-02-18 PROCEDURE — 1101F PR PT FALLS ASSESS DOC 0-1 FALLS W/OUT INJ PAST YR: ICD-10-PCS | Mod: HCNC,CPTII,S$GLB, | Performed by: OPHTHALMOLOGY

## 2022-02-18 PROCEDURE — 3288F PR FALLS RISK ASSESSMENT DOCUMENTED: ICD-10-PCS | Mod: HCNC,CPTII,S$GLB, | Performed by: OPHTHALMOLOGY

## 2022-02-18 PROCEDURE — 1126F AMNT PAIN NOTED NONE PRSNT: CPT | Mod: HCNC,CPTII,S$GLB, | Performed by: OPHTHALMOLOGY

## 2022-02-18 PROCEDURE — 4010F ACE/ARB THERAPY RXD/TAKEN: CPT | Mod: HCNC,CPTII,S$GLB, | Performed by: OPHTHALMOLOGY

## 2022-02-18 PROCEDURE — 4010F PR ACE/ARB THEARPY RXD/TAKEN: ICD-10-PCS | Mod: HCNC,CPTII,S$GLB, | Performed by: OPHTHALMOLOGY

## 2022-02-18 PROCEDURE — 1160F PR REVIEW ALL MEDS BY PRESCRIBER/CLIN PHARMACIST DOCUMENTED: ICD-10-PCS | Mod: HCNC,CPTII,S$GLB, | Performed by: OPHTHALMOLOGY

## 2022-02-18 PROCEDURE — 92136 OPHTHALMIC BIOMETRY: CPT | Mod: HCNC,LT,S$GLB, | Performed by: OPHTHALMOLOGY

## 2022-02-18 PROCEDURE — 99999 PR PBB SHADOW E&M-EST. PATIENT-LVL IV: ICD-10-PCS | Mod: PBBFAC,,, | Performed by: OPHTHALMOLOGY

## 2022-02-18 PROCEDURE — 99024 PR POST-OP FOLLOW-UP VISIT: ICD-10-PCS | Mod: HCNC,S$GLB,, | Performed by: OPHTHALMOLOGY

## 2022-02-18 RX ORDER — PROPARACAINE HYDROCHLORIDE 5 MG/ML
1 SOLUTION/ DROPS OPHTHALMIC
Status: CANCELLED | OUTPATIENT
Start: 2022-02-18 | End: 2022-02-18

## 2022-02-18 RX ORDER — PHENYLEPHRINE HYDROCHLORIDE 25 MG/ML
1 SOLUTION/ DROPS OPHTHALMIC
Status: CANCELLED | OUTPATIENT
Start: 2022-02-18 | End: 2022-02-18

## 2022-02-18 RX ORDER — PHENYLEPHRINE HYDROCHLORIDE 100 MG/ML
1 SOLUTION/ DROPS OPHTHALMIC
Status: CANCELLED | OUTPATIENT
Start: 2022-02-18 | End: 2022-02-18

## 2022-02-18 RX ORDER — TROPICAMIDE 10 MG/ML
1 SOLUTION/ DROPS OPHTHALMIC
Status: CANCELLED | OUTPATIENT
Start: 2022-02-18 | End: 2022-02-18

## 2022-02-18 NOTE — PROGRESS NOTES
HPI     Pt here for 1 week PO s/p cat sx OD.    Pt sts she is using Pred, Dico, and Moxi QID OD. Pt sts VA OD is stable.   Pt denies flashes/floaters/pain.     Last edited by Karyna Bruno on 2/18/2022  9:43 AM. (History)        ROS     Negative for: Constitutional, Gastrointestinal, Neurological, Skin,   Genitourinary, Musculoskeletal, HENT, Endocrine, Cardiovascular, Eyes,   Respiratory, Psychiatric, Allergic/Imm, Heme/Lymph    Last edited by Miguel Angel Rasmussen Jr., MD on 2/18/2022 10:01 AM. (History)        Assessment /Plan     For exam results, see Encounter Report.    Status post cataract extraction and insertion of intraocular lens of right eye    Age-related nuclear cataract of left eye  -     Case Request Operating Room: PHACOEMULSIFICATION, CATARACT  -     IOL Master - OS - Left Eye  -     COVID-19 Routine Screening; Future; Expected date: 02/19/2022    Age-related nuclear cataract, left eye      Stop Moxifloxacin  Decrease PF and Diclofenac 1x daily right   -schedule cataract surgery, left  -RBA discussed with patient  -Risks of worse vision, loss of vision, infection, bleeding, re-surgery,and may need to have surgery done by a different physician was explained to the patient  -patient was also counseled on premium lens options, laser cataract, and astigmatic correction  -patient was also counseled that they may still need glasses after surgery to help improve their vision, especially the need for reading glasses for reading small print texts after surgery  -patient understood the risks involved with cataract surgery and wanted to move forward with surgery    Follow up in about 13 days (around 3/3/2022) for cataract surgery left eye.

## 2022-02-23 RX ORDER — VALSARTAN 320 MG/1
320 TABLET ORAL NIGHTLY
Qty: 90 TABLET | Refills: 1 | Status: SHIPPED | OUTPATIENT
Start: 2022-02-23 | End: 2022-01-01

## 2022-02-23 RX ORDER — LANOLIN ALCOHOL/MO/W.PET/CERES
400 CREAM (GRAM) TOPICAL DAILY
Qty: 90 TABLET | Refills: 1 | Status: SHIPPED | OUTPATIENT
Start: 2022-02-23

## 2022-02-23 RX ORDER — TORSEMIDE 100 MG/1
100 TABLET ORAL EVERY MORNING
Qty: 90 TABLET | Refills: 1 | Status: SHIPPED | OUTPATIENT
Start: 2022-02-23 | End: 2023-01-01

## 2022-03-01 DIAGNOSIS — F32.A DEPRESSION, UNSPECIFIED DEPRESSION TYPE: ICD-10-CM

## 2022-03-01 NOTE — TELEPHONE ENCOUNTER
Care Due:                  Date            Visit Type   Department     Provider  --------------------------------------------------------------------------------                                [facility]  Last Visit: 04-      FOLLOW UP    MEDICINE       CAROLINA VIRAMONTES  Next Visit: None Scheduled  None         None Found                                                            Last  Test          Frequency    Reason                     Performed    Due Date  --------------------------------------------------------------------------------    Office Visit  12 months..  sertraline, traZODone....  04- 04-    Powered by [employer]. Reference number: [id number].   3/01/2022 4:30:48 PM CST

## 2022-03-05 ENCOUNTER — LAB VISIT (OUTPATIENT)
Dept: FAMILY MEDICINE | Facility: CLINIC | Age: 72
End: 2022-03-05
Payer: MEDICARE

## 2022-03-05 DIAGNOSIS — H25.12 AGE-RELATED NUCLEAR CATARACT OF LEFT EYE: ICD-10-CM

## 2022-03-05 PROCEDURE — U0003 INFECTIOUS AGENT DETECTION BY NUCLEIC ACID (DNA OR RNA); SEVERE ACUTE RESPIRATORY SYNDROME CORONAVIRUS 2 (SARS-COV-2) (CORONAVIRUS DISEASE [COVID-19]), AMPLIFIED PROBE TECHNIQUE, MAKING USE OF HIGH THROUGHPUT TECHNOLOGIES AS DESCRIBED BY CMS-2020-01-R: HCPCS | Mod: HCNC | Performed by: OPHTHALMOLOGY

## 2022-03-05 PROCEDURE — U0005 INFEC AGEN DETEC AMPLI PROBE: HCPCS | Performed by: OPHTHALMOLOGY

## 2022-03-06 LAB
SARS-COV-2 RNA RESP QL NAA+PROBE: NOT DETECTED
SARS-COV-2- CYCLE NUMBER: NORMAL

## 2022-03-07 ENCOUNTER — ANESTHESIA EVENT (OUTPATIENT)
Dept: SURGERY | Facility: HOSPITAL | Age: 72
End: 2022-03-07
Payer: MEDICARE

## 2022-03-07 ENCOUNTER — TELEPHONE (OUTPATIENT)
Dept: OPTOMETRY | Facility: CLINIC | Age: 72
End: 2022-03-07
Payer: MEDICARE

## 2022-03-07 NOTE — TELEPHONE ENCOUNTER
Got pt to come in sooner on Wednesday.     ----- Message from Teresa Garcia MA sent at 3/7/2022 11:27 AM CST -----  Ayaka Michelle Staff  Caller: Unspecified (Today, 10:56 AM)  Type:  Patient Call Back     Who Called:  Amanda     What is the reqeust in detail: Pt is requesting a call back to reschedule her post op for later in the day. Please Advise     Can the clinic reply by [facility]?     Best Call Back Number:[phone]

## 2022-03-08 ENCOUNTER — HOSPITAL ENCOUNTER (OUTPATIENT)
Facility: HOSPITAL | Age: 72
Discharge: HOME | End: 2022-03-08
Attending: OPHTHALMOLOGY | Admitting: OPHTHALMOLOGY
Payer: MEDICARE

## 2022-03-08 ENCOUNTER — ANESTHESIA (OUTPATIENT)
Dept: SURGERY | Facility: HOSPITAL | Age: 72
End: 2022-03-08
Payer: MEDICARE

## 2022-03-08 VITALS
BODY MASS INDEX: 30.12 KG/M2 | RESPIRATION RATE: 14 BRPM | HEIGHT: 63 IN | TEMPERATURE: 97 F | SYSTOLIC BLOOD PRESSURE: 100 MMHG | WEIGHT: 170 LBS | HEART RATE: 71 BPM | DIASTOLIC BLOOD PRESSURE: 55 MMHG | OXYGEN SATURATION: 96 %

## 2022-03-08 DIAGNOSIS — H25.12 AGE-RELATED NUCLEAR CATARACT OF LEFT EYE: Primary | ICD-10-CM

## 2022-03-08 DIAGNOSIS — H25.12 AGE-RELATED NUCLEAR CATARACT, LEFT EYE: ICD-10-CM

## 2022-03-08 PROCEDURE — 25000003 PHARM REV CODE 250: Mod: HCNC,PO

## 2022-03-08 PROCEDURE — D9220A PRA ANESTHESIA: Mod: HCNC,ANES,, | Performed by: ANESTHESIOLOGY

## 2022-03-08 PROCEDURE — D9220A PRA ANESTHESIA: Mod: HCNC,CRNA,, | Performed by: NURSE ANESTHETIST, CERTIFIED REGISTERED

## 2022-03-08 PROCEDURE — D9220A PRA ANESTHESIA: ICD-10-PCS | Mod: HCNC,CRNA,, | Performed by: NURSE ANESTHETIST, CERTIFIED REGISTERED

## 2022-03-08 PROCEDURE — 36000706: Mod: HCNC,PO | Performed by: OPHTHALMOLOGY

## 2022-03-08 PROCEDURE — 63600175 PHARM REV CODE 636 W HCPCS: Mod: HCNC,PO | Performed by: OPHTHALMOLOGY

## 2022-03-08 PROCEDURE — D9220A PRA ANESTHESIA: ICD-10-PCS | Mod: HCNC,ANES,, | Performed by: ANESTHESIOLOGY

## 2022-03-08 PROCEDURE — 25000003 PHARM REV CODE 250: Mod: HCNC,PO | Performed by: NURSE ANESTHETIST, CERTIFIED REGISTERED

## 2022-03-08 PROCEDURE — 37000008 HC ANESTHESIA 1ST 15 MINUTES: Mod: HCNC,PO | Performed by: OPHTHALMOLOGY

## 2022-03-08 PROCEDURE — 36000707: Mod: HCNC,PO | Performed by: OPHTHALMOLOGY

## 2022-03-08 PROCEDURE — 66984 XCAPSL CTRC RMVL W/O ECP: CPT | Mod: 79,HCNC,LT, | Performed by: OPHTHALMOLOGY

## 2022-03-08 PROCEDURE — 66984 PR REMOVAL, CATARACT, W/INSRT INTRAOC LENS, W/O ENDO CYCLO: ICD-10-PCS | Mod: 79,HCNC,LT, | Performed by: OPHTHALMOLOGY

## 2022-03-08 PROCEDURE — 25000003 PHARM REV CODE 250: Mod: HCNC,PO | Performed by: OPHTHALMOLOGY

## 2022-03-08 PROCEDURE — V2632 POST CHMBR INTRAOCULAR LENS: HCPCS | Mod: HCNC,PO | Performed by: OPHTHALMOLOGY

## 2022-03-08 PROCEDURE — 63600175 PHARM REV CODE 636 W HCPCS: Mod: HCNC,PO | Performed by: NURSE ANESTHETIST, CERTIFIED REGISTERED

## 2022-03-08 PROCEDURE — 71000015 HC POSTOP RECOV 1ST HR: Mod: HCNC,PO | Performed by: OPHTHALMOLOGY

## 2022-03-08 PROCEDURE — 63600175 PHARM REV CODE 636 W HCPCS: Mod: HCNC,PO | Performed by: ANESTHESIOLOGY

## 2022-03-08 PROCEDURE — 37000009 HC ANESTHESIA EA ADD 15 MINS: Mod: HCNC,PO | Performed by: OPHTHALMOLOGY

## 2022-03-08 DEVICE — LENS EYHANCE +22.0D: Type: IMPLANTABLE DEVICE | Site: EYE | Status: FUNCTIONAL

## 2022-03-08 RX ORDER — LIDOCAINE HYDROCHLORIDE 10 MG/ML
INJECTION INFILTRATION; PERINEURAL
Status: DISCONTINUED | OUTPATIENT
Start: 2022-03-08 | End: 2022-03-08 | Stop reason: HOSPADM

## 2022-03-08 RX ORDER — PROPARACAINE HYDROCHLORIDE 5 MG/ML
1 SOLUTION/ DROPS OPHTHALMIC
Status: COMPLETED | OUTPATIENT
Start: 2022-03-08 | End: 2022-03-08

## 2022-03-08 RX ORDER — PHENYLEPHRINE HYDROCHLORIDE 25 MG/ML
1 SOLUTION/ DROPS OPHTHALMIC
Status: COMPLETED | OUTPATIENT
Start: 2022-03-08 | End: 2022-03-08

## 2022-03-08 RX ORDER — TROPICAMIDE 10 MG/ML
1 SOLUTION/ DROPS OPHTHALMIC
Status: COMPLETED | OUTPATIENT
Start: 2022-03-08 | End: 2022-03-08

## 2022-03-08 RX ORDER — LIDOCAINE HYDROCHLORIDE 40 MG/ML
INJECTION, SOLUTION RETROBULBAR
Status: DISCONTINUED | OUTPATIENT
Start: 2022-03-08 | End: 2022-03-08

## 2022-03-08 RX ORDER — OFLOXACIN 3 MG/ML
SOLUTION/ DROPS OPHTHALMIC
Status: DISCONTINUED | OUTPATIENT
Start: 2022-03-08 | End: 2022-03-08 | Stop reason: HOSPADM

## 2022-03-08 RX ORDER — MIDAZOLAM HYDROCHLORIDE 1 MG/ML
INJECTION INTRAMUSCULAR; INTRAVENOUS
Status: DISCONTINUED | OUTPATIENT
Start: 2022-03-08 | End: 2022-03-08

## 2022-03-08 RX ORDER — PHENYLEPHRINE HYDROCHLORIDE 100 MG/ML
1 SOLUTION/ DROPS OPHTHALMIC
Status: COMPLETED | OUTPATIENT
Start: 2022-03-08 | End: 2022-03-08

## 2022-03-08 RX ORDER — EPINEPHRINE 1 MG/ML
INJECTION INTRAMUSCULAR; INTRAVENOUS; SUBCUTANEOUS
Status: DISCONTINUED | OUTPATIENT
Start: 2022-03-08 | End: 2022-03-08 | Stop reason: HOSPADM

## 2022-03-08 RX ORDER — LIDOCAINE HYDROCHLORIDE 10 MG/ML
1 INJECTION, SOLUTION EPIDURAL; INFILTRATION; INTRACAUDAL; PERINEURAL ONCE
Status: DISCONTINUED | OUTPATIENT
Start: 2022-03-08 | End: 2022-03-08 | Stop reason: HOSPADM

## 2022-03-08 RX ORDER — SODIUM CHLORIDE, SODIUM LACTATE, POTASSIUM CHLORIDE, CALCIUM CHLORIDE 600; 310; 30; 20 MG/100ML; MG/100ML; MG/100ML; MG/100ML
INJECTION, SOLUTION INTRAVENOUS CONTINUOUS
Status: DISCONTINUED | OUTPATIENT
Start: 2022-03-08 | End: 2022-03-08 | Stop reason: HOSPADM

## 2022-03-08 RX ORDER — LIDOCAINE HYDROCHLORIDE 40 MG/ML
INJECTION, SOLUTION RETROBULBAR
Status: DISCONTINUED | OUTPATIENT
Start: 2022-03-08 | End: 2022-03-08 | Stop reason: HOSPADM

## 2022-03-08 RX ADMIN — PHENYLEPHRINE HYDROCHLORIDE 1 DROP: 25 SOLUTION/ DROPS OPHTHALMIC at 06:03

## 2022-03-08 RX ADMIN — TROPICAMIDE 1 DROP: 10 SOLUTION/ DROPS OPHTHALMIC at 06:03

## 2022-03-08 RX ADMIN — PHENYLEPHRINE HYDROCHLORIDE 1 DROP: 100 SOLUTION/ DROPS OPHTHALMIC at 06:03

## 2022-03-08 RX ADMIN — PROPARACAINE HYDROCHLORIDE 1 DROP: 5 SOLUTION/ DROPS OPHTHALMIC at 06:03

## 2022-03-08 RX ADMIN — MIDAZOLAM HYDROCHLORIDE 2 MG: 1 INJECTION, SOLUTION INTRAMUSCULAR; INTRAVENOUS at 07:03

## 2022-03-08 RX ADMIN — LIDOCAINE HYDROCHLORIDE 0.2 ML: 40 INJECTION, SOLUTION RETROBULBAR; TOPICAL at 07:03

## 2022-03-08 RX ADMIN — SODIUM CHLORIDE, SODIUM LACTATE, POTASSIUM CHLORIDE, AND CALCIUM CHLORIDE: .6; .31; .03; .02 INJECTION, SOLUTION INTRAVENOUS at 07:03

## 2022-03-08 NOTE — BRIEF OP NOTE
Operative Note     SUMMARY     Surgery Date: 3/8/2022     Surgeon(s) and Role:     * Miguel Angel Rasmussen Jr., MD - Primary    Pre-op Diagnosis:  Age-related nuclear cataract of left eye [H25.12]    Post-op Diagnosis:  Age-related nuclear cataract of left eye [H25.12]    Procedure(s) (LRB):  PHACOEMULSIFICATION, CATARACT (Left)    Anesthesia: Monitor Anesthesia Care    Findings/Key Components:  Same as post op diagnosis    Estimated Blood Loss: * No values recorded between 3/8/2022  7:36 AM and 3/8/2022  7:55 AM *         Specimens (From admission, onward)    None            Discharge Note      SUMMARY     Admit Date: 3/8/2022    Attending Physician: Miguel Angel Rasmussen Jr., MD     Discharge Physician: Miguel Angel Rasmussen Jr., MD    Discharge Date: 3/8/2022     Final Diagnosis: Age-related nuclear cataract of left eye [H25.12]    Hospital Course: The patient was admitted for outpatient surgery and tolerated the procedure well. The patient was discharged home in stable condition the same day.    Diet: Advance as tolerated    Follow-Up: Follow up with Dr. Rasmussen, next day, as previously scheduled  Activity as tolerated.      Activity: Per Handout Instructions    Disposition: Home or self care    Patient Instructions:   Current Discharge Medication List      CONTINUE these medications which have NOT CHANGED    Details   aspirin (ECOTRIN) 81 MG EC tablet Take 81 mg by mouth once daily.      atorvastatin (LIPITOR) 20 MG tablet Take 1 tablet (20 mg total) by mouth once daily.  Qty: 90 tablet, Refills: 3    Associated Diagnoses: Dyslipidemia      BISACODYL ORAL Take 10 mg by mouth daily as needed (constipation).      calcitRIOL (ROCALTROL) 0.25 MCG Cap TAKE 1 CAPSULE(0.25 MCG) BY MOUTH EVERY DAY  Qty: 90 capsule, Refills: 3    Comments: **Patient requests 90 days supply**      calcium acetate,phosphat bind, (PHOSLO) 667 mg capsule One po with meals and large snacks  Qty: 450 capsule, Refills: 3      carvediloL (COREG) 25 MG tablet  One po BID  Qty: 180 tablet, Refills: 3    Comments: .      EPINEPHrine 0.3 mg/0.3 mL Syrg Inject 1 each into the muscle once as needed (anaphylactic reaction).      famotidine (PEPCID) 20 MG tablet Take 20 mg by mouth 2 (two) times daily as needed.       fluconazole (DIFLUCAN) 100 MG tablet One po QD X 2 days for yeast infection  Qty: 2 tablet, Refills: 1      fluticasone propionate (FLONASE) 50 mcg/actuation nasal spray 1 spray by Each Nostril route daily as needed for Allergies or Rhinitis.      HYDROcodone-acetaminophen (NORCO)  mg per tablet Take 1 tablet by mouth every 6 (six) hours as needed for Pain.  Qty: 10 tablet, Refills: 0    Comments: Quantity prescribed more than 7 day supply? No      levocetirizine (XYZAL) 5 MG tablet TAKE 1 TABLET(5 MG) BY MOUTH EVERY EVENING  Qty: 90 tablet, Refills: 3      magnesium oxide (MAG-OX) 400 mg (241.3 mg magnesium) tablet Take 1 tablet (400 mg total) by mouth once daily.  Qty: 90 tablet, Refills: 1      montelukast (SINGULAIR) 10 mg tablet Take 10 mg by mouth nightly as needed. Alternates with xyzal  Refills: 6      mupirocin (BACTROBAN) 2 % ointment Apply to PD catheter exit site daily  Qty: 30 g, Refills: 11      !! NIFEdipine (PROCARDIA-XL) 60 MG (OSM) 24 hr tablet TAKE 1 TABLET(60 MG) BY MOUTH EVERY EVENING  Qty: 90 tablet, Refills: 1    Associated Diagnoses: Essential hypertension      !! NIFEdipine (PROCARDIA-XL) 90 MG (OSM) 24 hr tablet Take 1 tablet (90 mg total) by mouth every evening.  Qty: 90 tablet, Refills: 1    Comments: Increase dose  Associated Diagnoses: Essential hypertension      ondansetron (ZOFRAN) 4 MG tablet Take 1 tablet (4 mg total) by mouth every 8 (eight) hours as needed for Nausea.  Qty: 45 tablet, Refills: 1      oxyCODONE-acetaminophen (PERCOCET)  mg per tablet Take 1 tablet by mouth every 4 (four) hours as needed for Pain.      pantoprazole (PROTONIX) 40 MG tablet Take 1 tablet (40 mg total) by mouth once daily. For stomach  acid  Qty: 90 tablet, Refills: 1      promethazine (PHENERGAN) 25 MG tablet Take 1 tablet (25 mg total) by mouth every 6 (six) hours as needed for Nausea.  Qty: 45 tablet, Refills: 6    Associated Diagnoses: Chronic low back pain, unspecified back pain laterality, unspecified whether sciatica present; Nausea      sennosides (LAXATIVE, SENNOSIDES,) 25 mg Tab Take 25 mg by mouth daily as needed (constipation).      !! sertraline (ZOLOFT) 50 MG tablet TAKE 1 TABLET(50 MG) BY MOUTH EVERY DAY  Qty: 90 tablet, Refills: 1    Associated Diagnoses: Depression, unspecified depression type      !! sertraline (ZOLOFT) 50 MG tablet TAKE 1 TABLET(50 MG) BY MOUTH EVERY DAY  Qty: 90 tablet, Refills: 1    Associated Diagnoses: Depression, unspecified depression type      tiZANidine (ZANAFLEX) 4 MG tablet Take 4 mg by mouth every 6 (six) hours as needed (muscle spasm).      torsemide (DEMADEX) 100 MG Tab Take 1 tablet (100 mg total) by mouth every morning.  Qty: 90 tablet, Refills: 1    Comments: Dose increase      traZODone (DESYREL) 100 MG tablet TAKE 2 TABLETS(200 MG) BY MOUTH EVERY NIGHT AS NEEDED FOR INSOMNIA  Qty: 180 tablet, Refills: 2    Associated Diagnoses: Insomnia, unspecified type      valsartan (DIOVAN) 320 MG tablet Take 1 tablet (320 mg total) by mouth every evening. For BP and kidneys  Qty: 90 tablet, Refills: 1    Comments: Increase dose      vitamin renal formula, B-complex-vitamin c-folic acid, (NEPHROCAPS) 1 mg Cap Take 1 capsule by mouth once daily.  Qty: 90 capsule, Refills: 3      cloNIDine (CATAPRES) 0.1 MG tablet Take 1 tablet (0.1 mg total) by mouth 3 (three) times daily as needed (BP > 160/100).  Qty: 60 tablet, Refills: 11    Comments: .  Associated Diagnoses: Essential hypertension       !! - Potential duplicate medications found. Please discuss with provider.          Discharge Procedure Orders (must include Diet, Follow-up, Activity)   Discharge Procedure Orders (must include Diet, Follow-up, Activity)    Diet general     Lifting restrictions     Leave dressing on - Keep it clean, dry, and intact until clinic visit     Call MD for:  persistent nausea and vomiting     Call MD for:  severe uncontrolled pain     Activity as tolerated

## 2022-03-08 NOTE — OP NOTE
Date of surgery: 3/8/2022    Operative Report    Preoperative Diagnosis: Nuclear sclerosis, left eye    Postoperative Diagnosis: Nuclear sclerosis, left eye    Procedure: Phacoemulsification with posterior chamber intraocular lens, left eye    Surgeon: Miguel Angel Rasmussen Jr. M.D.    Anesthesia: MAC with topical     Brief Preoperative Note:  The patient was seen in the office with cataract of the left eye.  Because of the impairment of the patient's reading, driving, ambulation, and other activities of daily living needing a good quality of vision, the patient elected to remove the cataract and place a acrylic lens implant, if possible    Procedure in detail:    Informed consent was obtained. Indications, risks and alternatives to the procedure were explained to the patient. The patient was given the opportunity to ask questions and consented to the procedure in writing. In the preoperative holding area, the patients identity and operative site were confirmed.  Topical anesthetic was administered, consisting of alternating 0.5% Proparacaine and 4% preservative-free Lidocaine Q 5 minutes times 3.  The patient was taken to the operative suite.  A time-out was performed.  The left eye was prepped with 5% Betadine and draped in sterile fashion.  A lid speculum was placed in the left eye.  The temporal clear corneal incision was developed using a LRI jayla knife and crescent blade for a 3 plane incision.  A paracentesis was done with a 1mm jayla blade.  Before instillation of the Viscoat, approximately 0.1 to 0.3cc of diluted preservative free intracameral lidocaine was instilled.  Viscoat was injected into the anterior chamber with a 27-gauge needle.  A 2.4mm jayla blade was used to make a temporal clear corneal incision.  Afterwards, a cystotome was used to perform a continuous curvilinear capsulorrhexis with the assistance of utrata forceps.  Hydrodissection was performed using balanced salt solution,injected  under the anterior capsule using a andrade cannula and hydrodelineation was performed using a blunt-tipped cannula.  Next, phacoemulsification performed in a divide and conquer fashion with the use of a nucleus rotator to turn the lens and protect the posterior capsule.  Cortical material was then removed using irrigation and aspiration.  Provisc was then injected into the anterior chamber and into capsular bag to inflate the bag for the placement of the lens implant and then an J&J DIBOO 22.0 D lens was injected into the capsular bag and rotated in position with the assistance of a Sinsky hook.  Irrigation and aspiration were used to remove the remaining viscoelastic. Next, Miochol was injected into the anterior chamber and the pupil was allowed to constrict in a symmetrical fashion.  Wound hydrated with BSS. A collagen shield was placed into the eye and the eye was covered with a Alvarez shield.  The patient tolerated the procedure well and was transferred to the recovery area in good condition.  Estimated blood loss:  none  Specimens:   none  Complications:  none  Disposition:   stable to PACU

## 2022-03-08 NOTE — TRANSFER OF CARE
"Anesthesia Transfer of Care Note    Patient: Amanda Smith    Procedure(s) Performed: Procedure(s) (LRB):  PHACOEMULSIFICATION, CATARACT (Left)    Patient location: PACU    Anesthesia Type: MAC    Transport from OR: Transported from OR on room air with adequate spontaneous ventilation    Post pain: adequate analgesia    Post assessment: no apparent anesthetic complications and tolerated procedure well    Post vital signs: stable    Level of consciousness: responds to stimulation    Nausea/Vomiting: no nausea/vomiting    Complications: none    Transfer of care protocol was followed      Last vitals:   Visit Vitals  /62   Pulse 70   Temp 36.2 °C (97.2 °F) (Skin)   Resp 18   Ht 5' 3" (1.6 m)   Wt 77.1 kg (170 lb)   SpO2 98%   Breastfeeding No   BMI 30.11 kg/m²     "

## 2022-03-08 NOTE — ANESTHESIA POSTPROCEDURE EVALUATION
Anesthesia Post Evaluation    Patient: Amanda Smith    Procedure(s) Performed: Procedure(s) (LRB):  PHACOEMULSIFICATION, CATARACT (Left)    Final Anesthesia Type: MAC      Patient location during evaluation: PACU  Patient participation: Yes- Able to Participate  Level of consciousness: awake and alert, oriented and awake  Post-procedure vital signs: reviewed and stable  Pain management: adequate  Airway patency: patent    PONV status at discharge: No PONV  Anesthetic complications: no      Cardiovascular status: blood pressure returned to baseline and hemodynamically stable  Respiratory status: unassisted, spontaneous ventilation and room air  Hydration status: euvolemic  Follow-up not needed.          Vitals Value Taken Time   /55 03/08/22 0805   Temp 36.3 °C (97.3 °F) 03/08/22 0755   Pulse 71 03/08/22 0805   Resp 14 03/08/22 0805   SpO2 96 % 03/08/22 0805         Event Time   Out of Recovery 08:05:00         Pain/Yue Score: Yue Score: 10 (3/8/2022  8:20 AM)

## 2022-03-08 NOTE — H&P
CC: H&P for cataract surgery  HPI: Patient has been diagnosed with cataracts, which are interfering with daily activities due to blurred vision and glare which cannot adequately be corrected with glasses.   PMH:  Past Medical History:   Diagnosis Date    Anemia of chronic disease 11/30/2015    Anticoagulant long-term use     Anxiety     Arthritis     Cataract     Chronic kidney disease     Dr. King    Chronic obstructive pulmonary disease 2/26/2018    Complex renal cyst     Depression     Encounter for blood transfusion     Fibromyalgia     GERD (gastroesophageal reflux disease)     Hypertension     Kidney stones     Pacemaker     Dr Vincent Rooney    Renal failure     Temporal arteritis        FH:  Family History   Problem Relation Age of Onset    Diabetes Mother     Macular degeneration Mother     Cancer Father         Brain    Heart disease Sister     Macular degeneration Sister     Diabetes Brother     Heart disease Brother 45    Colon cancer Brother 55    Cancer Brother         colon    Macular degeneration Brother     Diabetes Maternal Grandmother     Heart disease Maternal Grandfather     GI problems Grandchild         trying to determine if she has crohn's    Glaucoma Cousin     Crohn's disease Neg Hx     Ulcerative colitis Neg Hx     Esophageal cancer Neg Hx     Stomach cancer Neg Hx     Retinal detachment Neg Hx        SH:  Social History     Socioeconomic History    Marital status:    Tobacco Use    Smoking status: Former Smoker     Packs/day: 1.00     Years: 50.00     Pack years: 50.00     Types: Cigarettes     Quit date: 3/25/2018     Years since quitting: 3.9    Smokeless tobacco: Never Used   Substance and Sexual Activity    Alcohol use: No    Drug use: No    Sexual activity: Not Currently     Social Determinants of Health     Financial Resource Strain: Low Risk     Difficulty of Paying Living Expenses: Not hard at all   Food Insecurity: No Food  Insecurity    Worried About Running Out of Food in the Last Year: Never true    Ran Out of Food in the Last Year: Never true   Transportation Needs: No Transportation Needs    Lack of Transportation (Medical): No    Lack of Transportation (Non-Medical): No   Physical Activity: Insufficiently Active    Days of Exercise per Week: 1 day    Minutes of Exercise per Session: 20 min   Stress: No Stress Concern Present    Feeling of Stress : Only a little   Social Connections: Unknown    Frequency of Communication with Friends and Family: More than three times a week    Frequency of Social Gatherings with Friends and Family: Twice a week    Marital Status:    Housing Stability: Low Risk     Unable to Pay for Housing in the Last Year: No    Number of Places Lived in the Last Year: 1    Unstable Housing in the Last Year: No       ROS:  HEENT: Blurred vision  CV: No chest pain  Pulm: No SOB  Please see my last exam note for additional ROS details    PE:  Vitals:    03/08/22 0627   BP: 106/62   Pulse:    Resp:    Temp:      HEENT: Cataract  CV: N S1 and S2 no murmurs  Pulm: CTAB wheezes, rales, or ronchi  Abd:  soft nabs NTND no masses  Extremeties: No edema    A/P  1. Cataract - Indications, risks and alternatives to the procedure were explained to the patient. The patient was given the opportunity to ask questions and consented to the procedure in writing.  Proceed with cataract surgery as scheduled.  2. Other health issues - patient has been cleared by their PCP. See last note for details.

## 2022-03-08 NOTE — ANESTHESIA PREPROCEDURE EVALUATION
03/08/2022  Amanda Smith is a 71 y.o., female.    Pre-op Assessment    I have reviewed the Patient Summary Reports.    I have reviewed the Nursing Notes. I have reviewed the NPO Status.   I have reviewed the Medications.     Review of Systems  Anesthesia Hx:  No problems with previous Anesthesia Denies Hx of Anesthetic complications  Denies Family Hx of Anesthesia complications.   Denies Personal Hx of Anesthesia complications.   Social:  Former Smoker Quit 2018   Hematology/Oncology:         -- Anemia: Hematology Comments: Hgb 8.5  Anticoagulant long-term use   Cardiovascular:   Pacemaker (pacemaker) Hypertension, poorly controlled Dysrhythmias CHF PVD ECG has been reviewed. 2021 echo: normal EF, no DD, PAP 33 mmHg    No AICD   Pulmonary:   COPD, mild    Renal/:   Chronic Renal Disease, ESRD, Dialysis renal calculi K 5  Creatinine 2.6   Hepatic/GI:   GERD, well controlled    Musculoskeletal:   Arthritis  Hx cervical fusion, unable to drive car secondary to inability to move neck  Temporal arteritis   fibromyalgia Spine Disorders: cervical    Neurological:   Neuromuscular Disease, Headaches   Chronic Pain Syndrome   Endocrine:  Endocrine Normal    Psych:   anxiety depression          Physical Exam  General:  Obesity      Airway/Jaw/Neck:  Airway Findings: Mouth Opening: Normal   Tongue: Normal   General Airway Assessment: Adult Mallampati: II  TM Distance: Normal, at least 6 cm   Jaw/Neck Findings:  Neck ROM: Extension Decreased, Severe, Extension Painful, Decreased flexion, Decreased Lateral Motion, to the right, to the left   Neck Findings:  Girth Increased, Short Neck      Dental:  Dental Findings: Edentulous     Chest/Lungs:  Chest/Lungs Findings: Clear to auscultation, Normal Respiratory Rate      Heart/Vascular:  Heart Findings: Rate: Normal  Rhythm: Regular Rhythm        Mental  Status:  Mental Status Findings:  Alert and Oriented         Anesthesia Plan  Type of Anesthesia, risks & benefits discussed:  Anesthesia Type:  MAC    Patient's Preference:   Plan Factors:          Intra-op Monitoring Plan: standard ASA monitors  Intra-op Monitoring Plan Comments:   Post Op Pain Control Plan: multimodal analgesia and IV/PO Opioids PRN  Post Op Pain Control Plan Comments:     Induction:   IV  Beta Blocker:  Patient is on a Beta-Blocker and has received one dose within the past 24 hours (No further documentation required).       Informed Consent: Informed consent signed with the Patient and all parties understand the risks and agree with anesthesia plan.  All questions answered.  Anesthesia consent signed with patient.  ASA Score: 3     Day of Surgery Review of History & Physical: I have interviewed and examined the patient. I have reviewed the patient's H&P dated:  There are no significant changes.            Ready For Surgery From Anesthesia Perspective.           Physical Exam  General: Obesity    Airway:  Mallampati: II   Mouth Opening: Normal  TM Distance: Normal, at least 6 cm  Tongue: Normal  Neck ROM: Extension Decreased, Severe, Extension Painful, Decreased flexion, Decreased Lateral Motion, to the right, to the left  Neck: Girth Increased, Short Neck    Dental:  Edentulous    Chest/Lungs:  Clear to auscultation, Normal Respiratory Rate    Heart:  Rate: Normal  Rhythm: Regular Rhythm          Anesthesia Plan  Type of Anesthesia, risks & benefits discussed:    Anesthesia Type: MAC  Intra-op Monitoring Plan: standard ASA monitors  Post Op Pain Control Plan: multimodal analgesia and IV/PO Opioids PRN  Induction:  IV  Informed Consent: Informed consent signed with the Patient and all parties understand the risks and agree with anesthesia plan.  All questions answered.   ASA Score: 3  Day of Surgery Review of History & Physical: I have interviewed and examined the patient. I have reviewed the  patient's H&P dated:     Ready For Surgery From Anesthesia Perspective.       .

## 2022-03-09 ENCOUNTER — OFFICE VISIT (OUTPATIENT)
Dept: OPHTHALMOLOGY | Facility: CLINIC | Age: 72
End: 2022-03-09
Payer: MEDICARE

## 2022-03-09 DIAGNOSIS — Z96.1 STATUS POST CATARACT EXTRACTION AND INSERTION OF INTRAOCULAR LENS OF LEFT EYE: Primary | ICD-10-CM

## 2022-03-09 DIAGNOSIS — Z98.42 STATUS POST CATARACT EXTRACTION AND INSERTION OF INTRAOCULAR LENS OF LEFT EYE: Primary | ICD-10-CM

## 2022-03-09 PROCEDURE — 1125F PR PAIN SEVERITY QUANTIFIED, PAIN PRESENT: ICD-10-PCS | Mod: HCNC,CPTII,S$GLB, | Performed by: OPHTHALMOLOGY

## 2022-03-09 PROCEDURE — 1159F MED LIST DOCD IN RCRD: CPT | Mod: HCNC,CPTII,S$GLB, | Performed by: OPHTHALMOLOGY

## 2022-03-09 PROCEDURE — 99999 PR PBB SHADOW E&M-EST. PATIENT-LVL III: CPT | Mod: PBBFAC,HCNC,, | Performed by: OPHTHALMOLOGY

## 2022-03-09 PROCEDURE — 3288F FALL RISK ASSESSMENT DOCD: CPT | Mod: HCNC,CPTII,S$GLB, | Performed by: OPHTHALMOLOGY

## 2022-03-09 PROCEDURE — 4010F PR ACE/ARB THEARPY RXD/TAKEN: ICD-10-PCS | Mod: HCNC,CPTII,S$GLB, | Performed by: OPHTHALMOLOGY

## 2022-03-09 PROCEDURE — 1159F PR MEDICATION LIST DOCUMENTED IN MEDICAL RECORD: ICD-10-PCS | Mod: HCNC,CPTII,S$GLB, | Performed by: OPHTHALMOLOGY

## 2022-03-09 PROCEDURE — 4010F ACE/ARB THERAPY RXD/TAKEN: CPT | Mod: HCNC,CPTII,S$GLB, | Performed by: OPHTHALMOLOGY

## 2022-03-09 PROCEDURE — 3288F PR FALLS RISK ASSESSMENT DOCUMENTED: ICD-10-PCS | Mod: HCNC,CPTII,S$GLB, | Performed by: OPHTHALMOLOGY

## 2022-03-09 PROCEDURE — 1160F PR REVIEW ALL MEDS BY PRESCRIBER/CLIN PHARMACIST DOCUMENTED: ICD-10-PCS | Mod: HCNC,CPTII,S$GLB, | Performed by: OPHTHALMOLOGY

## 2022-03-09 PROCEDURE — 99024 PR POST-OP FOLLOW-UP VISIT: ICD-10-PCS | Mod: HCNC,S$GLB,, | Performed by: OPHTHALMOLOGY

## 2022-03-09 PROCEDURE — 1101F PR PT FALLS ASSESS DOC 0-1 FALLS W/OUT INJ PAST YR: ICD-10-PCS | Mod: HCNC,CPTII,S$GLB, | Performed by: OPHTHALMOLOGY

## 2022-03-09 PROCEDURE — 99999 PR PBB SHADOW E&M-EST. PATIENT-LVL III: ICD-10-PCS | Mod: PBBFAC,HCNC,, | Performed by: OPHTHALMOLOGY

## 2022-03-09 PROCEDURE — 99024 POSTOP FOLLOW-UP VISIT: CPT | Mod: HCNC,S$GLB,, | Performed by: OPHTHALMOLOGY

## 2022-03-09 PROCEDURE — 1125F AMNT PAIN NOTED PAIN PRSNT: CPT | Mod: HCNC,CPTII,S$GLB, | Performed by: OPHTHALMOLOGY

## 2022-03-09 PROCEDURE — 1160F RVW MEDS BY RX/DR IN RCRD: CPT | Mod: HCNC,CPTII,S$GLB, | Performed by: OPHTHALMOLOGY

## 2022-03-09 PROCEDURE — 1101F PT FALLS ASSESS-DOCD LE1/YR: CPT | Mod: HCNC,CPTII,S$GLB, | Performed by: OPHTHALMOLOGY

## 2022-03-09 RX ORDER — VIT B COMP NO.3/FOLIC/C/BIOTIN 1 MG-60 MG
1 TABLET ORAL DAILY
COMMUNITY
Start: 2022-02-25

## 2022-03-09 RX ORDER — SERTRALINE HYDROCHLORIDE 50 MG/1
TABLET, FILM COATED ORAL
Qty: 90 TABLET | Refills: 1 | Status: SHIPPED | OUTPATIENT
Start: 2022-03-09 | End: 2022-08-01 | Stop reason: SDUPTHER

## 2022-03-09 NOTE — PROGRESS NOTES
HPI     1 day post op OS done 3/8     Pt states sx went well. Denies eye pain. Some irritation this a.m. Pt   understands how to administer sx gtts: pred, diclo, viga x4 OS.     Last edited by Devin Ambrocio on 3/9/2022  8:31 AM. (History)        ROS     Negative for: Constitutional, Gastrointestinal, Neurological, Skin,   Genitourinary, Musculoskeletal, HENT, Endocrine, Cardiovascular, Eyes,   Respiratory, Psychiatric, Allergic/Imm, Heme/Lymph    Last edited by Miguel Angel Rasmussen Jr., MD on 3/9/2022  8:37 AM. (History)        Assessment /Plan     For exam results, see Encounter Report.    Status post cataract extraction and insertion of intraocular lens of left eye      Prednisolone acetate (pink or white top drop) 1 drop 4x daily left eye; shake well before using drop  Vigamox 1 drop 4x daily left eye (tan top)  Diclofenac 1 drop 4x daily left eye (gray top)  No bending no lifting  Keep head elevated when laying down  Keep dry   F/u 1 week

## 2022-03-22 ENCOUNTER — TELEPHONE (OUTPATIENT)
Dept: NEPHROLOGY | Facility: CLINIC | Age: 72
End: 2022-03-22
Payer: MEDICARE

## 2022-03-22 ENCOUNTER — PATIENT MESSAGE (OUTPATIENT)
Dept: NEUROLOGY | Facility: CLINIC | Age: 72
End: 2022-03-22
Payer: MEDICARE

## 2022-03-22 DIAGNOSIS — R22.1 NECK MASS: Primary | ICD-10-CM

## 2022-03-22 RX ORDER — PREDNISONE 20 MG/1
TABLET ORAL
Qty: 6 TABLET | Refills: 3 | Status: SHIPPED | OUTPATIENT
Start: 2022-03-22 | End: 2023-01-01 | Stop reason: SDUPTHER

## 2022-04-26 ENCOUNTER — OFFICE VISIT (OUTPATIENT)
Dept: OTOLARYNGOLOGY | Facility: CLINIC | Age: 72
End: 2022-04-26
Payer: MEDICARE

## 2022-04-26 VITALS — WEIGHT: 170 LBS | TEMPERATURE: 99 F | HEIGHT: 63 IN | BODY MASS INDEX: 30.12 KG/M2

## 2022-04-26 DIAGNOSIS — R22.1 SUBMENTAL MASS: Primary | ICD-10-CM

## 2022-04-26 PROCEDURE — 1101F PT FALLS ASSESS-DOCD LE1/YR: CPT | Mod: CPTII,S$GLB,, | Performed by: NURSE PRACTITIONER

## 2022-04-26 PROCEDURE — 3008F PR BODY MASS INDEX (BMI) DOCUMENTED: ICD-10-PCS | Mod: CPTII,S$GLB,, | Performed by: NURSE PRACTITIONER

## 2022-04-26 PROCEDURE — 99203 OFFICE O/P NEW LOW 30 MIN: CPT | Mod: S$GLB,,, | Performed by: NURSE PRACTITIONER

## 2022-04-26 PROCEDURE — 1160F PR REVIEW ALL MEDS BY PRESCRIBER/CLIN PHARMACIST DOCUMENTED: ICD-10-PCS | Mod: CPTII,S$GLB,, | Performed by: NURSE PRACTITIONER

## 2022-04-26 PROCEDURE — 4010F ACE/ARB THERAPY RXD/TAKEN: CPT | Mod: CPTII,S$GLB,, | Performed by: NURSE PRACTITIONER

## 2022-04-26 PROCEDURE — 1159F PR MEDICATION LIST DOCUMENTED IN MEDICAL RECORD: ICD-10-PCS | Mod: CPTII,S$GLB,, | Performed by: NURSE PRACTITIONER

## 2022-04-26 PROCEDURE — 1126F PR PAIN SEVERITY QUANTIFIED, NO PAIN PRESENT: ICD-10-PCS | Mod: CPTII,S$GLB,, | Performed by: NURSE PRACTITIONER

## 2022-04-26 PROCEDURE — 99999 PR PBB SHADOW E&M-EST. PATIENT-LVL IV: ICD-10-PCS | Mod: PBBFAC,,, | Performed by: NURSE PRACTITIONER

## 2022-04-26 PROCEDURE — 99203 PR OFFICE/OUTPT VISIT, NEW, LEVL III, 30-44 MIN: ICD-10-PCS | Mod: S$GLB,,, | Performed by: NURSE PRACTITIONER

## 2022-04-26 PROCEDURE — 1160F RVW MEDS BY RX/DR IN RCRD: CPT | Mod: CPTII,S$GLB,, | Performed by: NURSE PRACTITIONER

## 2022-04-26 PROCEDURE — 3008F BODY MASS INDEX DOCD: CPT | Mod: CPTII,S$GLB,, | Performed by: NURSE PRACTITIONER

## 2022-04-26 PROCEDURE — 1126F AMNT PAIN NOTED NONE PRSNT: CPT | Mod: CPTII,S$GLB,, | Performed by: NURSE PRACTITIONER

## 2022-04-26 PROCEDURE — 1159F MED LIST DOCD IN RCRD: CPT | Mod: CPTII,S$GLB,, | Performed by: NURSE PRACTITIONER

## 2022-04-26 PROCEDURE — 4010F PR ACE/ARB THEARPY RXD/TAKEN: ICD-10-PCS | Mod: CPTII,S$GLB,, | Performed by: NURSE PRACTITIONER

## 2022-04-26 PROCEDURE — 1101F PR PT FALLS ASSESS DOC 0-1 FALLS W/OUT INJ PAST YR: ICD-10-PCS | Mod: CPTII,S$GLB,, | Performed by: NURSE PRACTITIONER

## 2022-04-26 PROCEDURE — 3288F PR FALLS RISK ASSESSMENT DOCUMENTED: ICD-10-PCS | Mod: CPTII,S$GLB,, | Performed by: NURSE PRACTITIONER

## 2022-04-26 PROCEDURE — 3288F FALL RISK ASSESSMENT DOCD: CPT | Mod: CPTII,S$GLB,, | Performed by: NURSE PRACTITIONER

## 2022-04-26 PROCEDURE — 99999 PR PBB SHADOW E&M-EST. PATIENT-LVL IV: CPT | Mod: PBBFAC,,, | Performed by: NURSE PRACTITIONER

## 2022-04-26 NOTE — PROGRESS NOTES
Subjective:       Patient ID: Amanda Smith is a 71 y.o. female.    Chief Complaint: throat mass    HPI   Patient is new to ENT, referred by Dr. King for neck mass. Patient was scratching her neck just under her chin approx 6 weeks ago when she felt a small lump under her left chin. It is not painful. It is not swelling. No heat/warmth or redness. No skin changes. No mouth pain.     Review of Systems   Constitutional: Negative.    HENT: Negative.    Eyes: Negative.    Respiratory: Negative.    Cardiovascular: Negative.    Gastrointestinal: Negative.    Musculoskeletal: Negative.    Integumentary:  Negative.   Neurological: Negative.    Hematological: Negative.    Psychiatric/Behavioral: Negative.          Objective:      Physical Exam  Vitals and nursing note reviewed.   Constitutional:       General: She is not in acute distress.     Appearance: She is well-developed. She is not ill-appearing or diaphoretic.   HENT:      Head: Normocephalic and atraumatic.      Right Ear: Hearing, tympanic membrane, ear canal and external ear normal. No middle ear effusion. Tympanic membrane is not erythematous.      Left Ear: Hearing, tympanic membrane, ear canal and external ear normal.  No middle ear effusion. Tympanic membrane is not erythematous.      Nose: Septal deviation (right) present.      Mouth/Throat:      Dentition: Abnormal dentition (edentulous). Has dentures (upper plate only).      Pharynx: Uvula midline.   Eyes:      General: Lids are normal. No scleral icterus.        Right eye: No discharge.         Left eye: No discharge.   Neck:      Trachea: Trachea normal. No tracheal deviation.   Cardiovascular:      Rate and Rhythm: Normal rate.   Pulmonary:      Effort: Pulmonary effort is normal. No respiratory distress.      Breath sounds: No stridor. No wheezing.   Musculoskeletal:         General: Normal range of motion.      Cervical back: Normal range of motion and neck supple.   Skin:     General: Skin  is warm and dry.      Coloration: Skin is not pale.   Neurological:      Mental Status: She is alert and oriented to person, place, and time.      Coordination: Coordination normal.      Gait: Gait normal.   Psychiatric:         Speech: Speech normal.         Behavior: Behavior normal. Behavior is cooperative.         Thought Content: Thought content normal.         Judgment: Judgment normal.         Assessment:       Problem List Items Addressed This Visit    None     Visit Diagnoses     Left submental mass    -  Primary    Relevant Orders    US Soft Tissue Head Neck Thyroid          Plan:     Ultrasound ordered    Reassured no palpable abnormality noted  Likely either small normal lymph node or minor salivary gland

## 2022-04-28 ENCOUNTER — TELEPHONE (OUTPATIENT)
Dept: NEPHROLOGY | Facility: CLINIC | Age: 72
End: 2022-04-28
Payer: MEDICARE

## 2022-04-28 DIAGNOSIS — R06.02 SOB (SHORTNESS OF BREATH): Primary | ICD-10-CM

## 2022-05-18 ENCOUNTER — PES CALL (OUTPATIENT)
Dept: ADMINISTRATIVE | Facility: CLINIC | Age: 72
End: 2022-05-18
Payer: MEDICARE

## 2022-05-28 ENCOUNTER — TELEPHONE (OUTPATIENT)
Dept: NEPHROLOGY | Facility: CLINIC | Age: 72
End: 2022-05-28
Payer: MEDICARE

## 2022-05-28 DIAGNOSIS — T85.611A PERITONEAL DIALYSIS CATHETER DYSFUNCTION, INITIAL ENCOUNTER: Primary | ICD-10-CM

## 2022-05-31 ENCOUNTER — PATIENT MESSAGE (OUTPATIENT)
Dept: ADMINISTRATIVE | Facility: HOSPITAL | Age: 72
End: 2022-05-31
Payer: MEDICARE

## 2022-06-07 ENCOUNTER — PES CALL (OUTPATIENT)
Dept: ADMINISTRATIVE | Facility: CLINIC | Age: 72
End: 2022-06-07
Payer: MEDICARE

## 2022-06-24 ENCOUNTER — TELEPHONE (OUTPATIENT)
Dept: NEPHROLOGY | Facility: CLINIC | Age: 72
End: 2022-06-24
Payer: MEDICARE

## 2022-06-24 RX ORDER — MELOXICAM 15 MG/1
TABLET ORAL
Qty: 30 TABLET | Refills: 3 | Status: SHIPPED | OUTPATIENT
Start: 2022-06-24

## 2022-06-24 NOTE — TELEPHONE ENCOUNTER
Please schedule CXR and KUB for pt this Monday or Tuesday (6/27 or 6/28) at [facility]. Please call pt too. Ty

## 2022-06-28 ENCOUNTER — TELEPHONE (OUTPATIENT)
Dept: NEPHROLOGY | Facility: CLINIC | Age: 72
End: 2022-06-28
Payer: MEDICARE

## 2022-06-28 NOTE — TELEPHONE ENCOUNTER
----- Message from Edna Batista sent at 6/28/2022 10:25 AM CDT -----  Yessi with [facility] need directions for the prescription meloxicam. Call back number is [phone] and fax is [phone]. Thx. EL

## 2022-07-03 DIAGNOSIS — G47.00 INSOMNIA, UNSPECIFIED TYPE: ICD-10-CM

## 2022-07-03 NOTE — TELEPHONE ENCOUNTER
Care Due:                  Date            Visit Type   Department     Provider  --------------------------------------------------------------------------------                                [facility]  Last Visit: 04-      FOLLOW UP    MEDICINE       CAROLINA VIRAMONTES  Next Visit: None Scheduled  None         None Found                                                            Last  Test          Frequency    Reason                     Performed    Due Date  --------------------------------------------------------------------------------    Office Visit  12 months..  sertraline, traZODone....  04- 04-    [facility] Embedded Care Gaps. Reference number: [id number]. 7/03/2022   6:02:33 AM JOSIAHT

## 2022-07-05 RX ORDER — TRAZODONE HYDROCHLORIDE 100 MG/1
TABLET ORAL
Qty: 180 TABLET | Refills: 2 | OUTPATIENT
Start: 2022-07-05

## 2022-07-05 RX ORDER — CALCITRIOL 0.25 UG/1
CAPSULE ORAL
Qty: 90 CAPSULE | Refills: 3 | OUTPATIENT
Start: 2022-07-05

## 2022-07-08 ENCOUNTER — HOSPITAL ENCOUNTER (OUTPATIENT)
Dept: RADIOLOGY | Facility: HOSPITAL | Age: 72
Discharge: HOME | End: 2022-07-08
Attending: INTERNAL MEDICINE
Payer: MEDICARE

## 2022-07-08 DIAGNOSIS — T85.611A PERITONEAL DIALYSIS CATHETER DYSFUNCTION, INITIAL ENCOUNTER: ICD-10-CM

## 2022-07-08 DIAGNOSIS — R06.02 SOB (SHORTNESS OF BREATH): ICD-10-CM

## 2022-07-08 PROCEDURE — 71046 X-RAY EXAM CHEST 2 VIEWS: CPT | Mod: TC,FY,PO

## 2022-07-08 PROCEDURE — 71046 X-RAY EXAM CHEST 2 VIEWS: CPT | Mod: 26,,, | Performed by: RADIOLOGY

## 2022-07-08 PROCEDURE — 74018 XR ABDOMEN AP 1 VIEW: ICD-10-PCS | Mod: 26,,, | Performed by: RADIOLOGY

## 2022-07-08 PROCEDURE — 71046 XR CHEST PA AND LATERAL: ICD-10-PCS | Mod: 26,,, | Performed by: RADIOLOGY

## 2022-07-08 PROCEDURE — 74018 RADEX ABDOMEN 1 VIEW: CPT | Mod: 26,,, | Performed by: RADIOLOGY

## 2022-07-08 PROCEDURE — 74018 RADEX ABDOMEN 1 VIEW: CPT | Mod: TC,FY,PO

## 2022-07-11 ENCOUNTER — TELEPHONE (OUTPATIENT)
Dept: NEPHROLOGY | Facility: CLINIC | Age: 72
End: 2022-07-11
Payer: MEDICARE

## 2022-07-13 ENCOUNTER — TELEPHONE (OUTPATIENT)
Dept: NEPHROLOGY | Facility: CLINIC | Age: 72
End: 2022-07-13
Payer: MEDICARE

## 2022-07-13 DIAGNOSIS — R10.9 ABDOMINAL PAIN, UNSPECIFIED ABDOMINAL LOCATION: ICD-10-CM

## 2022-07-13 NOTE — TELEPHONE ENCOUNTER
Patient is scheduled for Friday 7/15 at 10:15 am. Arrival for 9:15 am and fast 4 hrs prior. Patient was informed through her sister La.

## 2022-07-13 NOTE — TELEPHONE ENCOUNTER
Please schedule CT of abd/pelvis for pt (no contrast) She is ESRD PD pt and having PD catheter pain. She is aware she needs the CT. Thank you

## 2022-07-15 ENCOUNTER — HOSPITAL ENCOUNTER (OUTPATIENT)
Dept: RADIOLOGY | Facility: HOSPITAL | Age: 72
Discharge: HOME | End: 2022-07-15
Attending: INTERNAL MEDICINE
Payer: MEDICARE

## 2022-07-15 DIAGNOSIS — R10.9 ABDOMINAL PAIN, UNSPECIFIED ABDOMINAL LOCATION: ICD-10-CM

## 2022-07-15 PROCEDURE — 74176 CT ABD & PELVIS W/O CONTRAST: CPT | Mod: TC,PO

## 2022-07-15 PROCEDURE — A9698 NON-RAD CONTRAST MATERIALNOC: HCPCS | Mod: PO | Performed by: INTERNAL MEDICINE

## 2022-07-15 PROCEDURE — 25500020 PHARM REV CODE 255: Mod: PO | Performed by: INTERNAL MEDICINE

## 2022-07-15 PROCEDURE — 74176 CT ABD & PELVIS W/O CONTRAST: CPT | Mod: 26,,, | Performed by: RADIOLOGY

## 2022-07-15 PROCEDURE — 74176 CT ABDOMEN PELVIS WITHOUT CONTRAST: ICD-10-PCS | Mod: 26,,, | Performed by: RADIOLOGY

## 2022-07-15 RX ADMIN — BARIUM SULFATE 900 ML: 20 SUSPENSION ORAL at 11:07

## 2022-07-18 ENCOUNTER — TELEPHONE (OUTPATIENT)
Dept: FAMILY MEDICINE | Facility: CLINIC | Age: 72
End: 2022-07-18
Payer: MEDICARE

## 2022-07-18 NOTE — TELEPHONE ENCOUNTER
----- Message from Ananda King MD sent at 7/15/2022  3:54 PM CDT -----  Regarding: recent CT scan  Good day!    I ordered the CT scan for PD catheter issues--I was checking for proper placement as she is having drain pain.     I wanted to let you know about her GI vascular supply seen on CT--she complains of much UGI issues--nausea with occasional vomiting, and some pain post prandial, but mostly anorexia. Doesn't seem vascular but, maybe. Symptoms did improve a little with the addition of PPI, and she declines GI evaluation every time I ask her.     In general her BP is better controlled and her PD labs are good. Her clearance is good, and anemia is stable.     What are your thoughts on the CT?

## 2022-07-18 NOTE — TELEPHONE ENCOUNTER
Per note from Dr Valverde regarding abnormal CT.  Please get patient in as not seen by me in 1.5 yrs.  Needs an appointment with me.  OK 1, 2, 3 or 7 Day Change, but NO Hospital f/u patient slots, NO Yellow hold and NO AREX hold.

## 2022-07-18 NOTE — TELEPHONE ENCOUNTER
Patient will come in 8/1 @ 1:30 per dr. Arnie hook to use 3 day hold.    Patient aware of time waiting on super user to change.

## 2022-07-25 ENCOUNTER — PES CALL (OUTPATIENT)
Dept: ADMINISTRATIVE | Facility: CLINIC | Age: 72
End: 2022-07-25
Payer: MEDICARE

## 2022-08-01 ENCOUNTER — OFFICE VISIT (OUTPATIENT)
Dept: FAMILY MEDICINE | Facility: CLINIC | Age: 72
End: 2022-08-01
Payer: MEDICARE

## 2022-08-01 VITALS
HEART RATE: 107 BPM | HEIGHT: 63 IN | WEIGHT: 184.94 LBS | DIASTOLIC BLOOD PRESSURE: 74 MMHG | BODY MASS INDEX: 32.77 KG/M2 | SYSTOLIC BLOOD PRESSURE: 170 MMHG | OXYGEN SATURATION: 96 % | TEMPERATURE: 99 F

## 2022-08-01 DIAGNOSIS — M31.6 TEMPORAL ARTERITIS: ICD-10-CM

## 2022-08-01 DIAGNOSIS — Z78.0 POST-MENOPAUSAL: ICD-10-CM

## 2022-08-01 DIAGNOSIS — F32.A DEPRESSION, UNSPECIFIED DEPRESSION TYPE: ICD-10-CM

## 2022-08-01 DIAGNOSIS — N25.81 SECONDARY HYPERPARATHYROIDISM, RENAL: ICD-10-CM

## 2022-08-01 DIAGNOSIS — F17.200 NICOTINE DEPENDENCE, UNCOMPLICATED, UNSPECIFIED NICOTINE PRODUCT TYPE: ICD-10-CM

## 2022-08-01 DIAGNOSIS — Z12.31 ENCOUNTER FOR SCREENING MAMMOGRAM FOR BREAST CANCER: ICD-10-CM

## 2022-08-01 DIAGNOSIS — Z00.00 ROUTINE PHYSICAL EXAMINATION: Primary | ICD-10-CM

## 2022-08-01 DIAGNOSIS — Z71.85 VACCINE COUNSELING: ICD-10-CM

## 2022-08-01 DIAGNOSIS — Z87.891 PERSONAL HISTORY OF NICOTINE DEPENDENCE: ICD-10-CM

## 2022-08-01 DIAGNOSIS — I10 ESSENTIAL HYPERTENSION: ICD-10-CM

## 2022-08-01 DIAGNOSIS — N18.6 ESRD (END STAGE RENAL DISEASE): ICD-10-CM

## 2022-08-01 DIAGNOSIS — F51.01 PRIMARY INSOMNIA: ICD-10-CM

## 2022-08-01 PROCEDURE — 1101F PT FALLS ASSESS-DOCD LE1/YR: CPT | Mod: CPTII,S$GLB,, | Performed by: INTERNAL MEDICINE

## 2022-08-01 PROCEDURE — 90670 PNEUMOCOCCAL CONJUGATE VACCINE 13-VALENT LESS THAN 5YO & GREATER THAN: ICD-10-PCS | Mod: S$GLB,,, | Performed by: INTERNAL MEDICINE

## 2022-08-01 PROCEDURE — 99999 PR PBB SHADOW E&M-EST. PATIENT-LVL V: ICD-10-PCS | Mod: PBBFAC,,, | Performed by: INTERNAL MEDICINE

## 2022-08-01 PROCEDURE — 1125F AMNT PAIN NOTED PAIN PRSNT: CPT | Mod: CPTII,S$GLB,, | Performed by: INTERNAL MEDICINE

## 2022-08-01 PROCEDURE — 3077F PR MOST RECENT SYSTOLIC BLOOD PRESSURE >= 140 MM HG: ICD-10-PCS | Mod: CPTII,S$GLB,, | Performed by: INTERNAL MEDICINE

## 2022-08-01 PROCEDURE — 3008F PR BODY MASS INDEX (BMI) DOCUMENTED: ICD-10-PCS | Mod: CPTII,S$GLB,, | Performed by: INTERNAL MEDICINE

## 2022-08-01 PROCEDURE — 4010F ACE/ARB THERAPY RXD/TAKEN: CPT | Mod: CPTII,S$GLB,, | Performed by: INTERNAL MEDICINE

## 2022-08-01 PROCEDURE — 3078F PR MOST RECENT DIASTOLIC BLOOD PRESSURE < 80 MM HG: ICD-10-PCS | Mod: CPTII,S$GLB,, | Performed by: INTERNAL MEDICINE

## 2022-08-01 PROCEDURE — 90670 PCV13 VACCINE IM: CPT | Mod: S$GLB,,, | Performed by: INTERNAL MEDICINE

## 2022-08-01 PROCEDURE — 1159F MED LIST DOCD IN RCRD: CPT | Mod: CPTII,S$GLB,, | Performed by: INTERNAL MEDICINE

## 2022-08-01 PROCEDURE — 1159F PR MEDICATION LIST DOCUMENTED IN MEDICAL RECORD: ICD-10-PCS | Mod: CPTII,S$GLB,, | Performed by: INTERNAL MEDICINE

## 2022-08-01 PROCEDURE — 1160F PR REVIEW ALL MEDS BY PRESCRIBER/CLIN PHARMACIST DOCUMENTED: ICD-10-PCS | Mod: CPTII,S$GLB,, | Performed by: INTERNAL MEDICINE

## 2022-08-01 PROCEDURE — 4010F PR ACE/ARB THEARPY RXD/TAKEN: ICD-10-PCS | Mod: CPTII,S$GLB,, | Performed by: INTERNAL MEDICINE

## 2022-08-01 PROCEDURE — 99397 PR PREVENTIVE VISIT,EST,65 & OVER: ICD-10-PCS | Mod: S$GLB,,, | Performed by: INTERNAL MEDICINE

## 2022-08-01 PROCEDURE — 1160F RVW MEDS BY RX/DR IN RCRD: CPT | Mod: CPTII,S$GLB,, | Performed by: INTERNAL MEDICINE

## 2022-08-01 PROCEDURE — 3288F FALL RISK ASSESSMENT DOCD: CPT | Mod: CPTII,S$GLB,, | Performed by: INTERNAL MEDICINE

## 2022-08-01 PROCEDURE — 3288F PR FALLS RISK ASSESSMENT DOCUMENTED: ICD-10-PCS | Mod: CPTII,S$GLB,, | Performed by: INTERNAL MEDICINE

## 2022-08-01 PROCEDURE — 1101F PR PT FALLS ASSESS DOC 0-1 FALLS W/OUT INJ PAST YR: ICD-10-PCS | Mod: CPTII,S$GLB,, | Performed by: INTERNAL MEDICINE

## 2022-08-01 PROCEDURE — G0009 ADMIN PNEUMOCOCCAL VACCINE: HCPCS | Mod: S$GLB,,, | Performed by: INTERNAL MEDICINE

## 2022-08-01 PROCEDURE — 3008F BODY MASS INDEX DOCD: CPT | Mod: CPTII,S$GLB,, | Performed by: INTERNAL MEDICINE

## 2022-08-01 PROCEDURE — 99397 PER PM REEVAL EST PAT 65+ YR: CPT | Mod: S$GLB,,, | Performed by: INTERNAL MEDICINE

## 2022-08-01 PROCEDURE — G0009 PNEUMOCOCCAL CONJUGATE VACCINE 13-VALENT LESS THAN 5YO & GREATER THAN: ICD-10-PCS | Mod: S$GLB,,, | Performed by: INTERNAL MEDICINE

## 2022-08-01 PROCEDURE — 3077F SYST BP >= 140 MM HG: CPT | Mod: CPTII,S$GLB,, | Performed by: INTERNAL MEDICINE

## 2022-08-01 PROCEDURE — 3078F DIAST BP <80 MM HG: CPT | Mod: CPTII,S$GLB,, | Performed by: INTERNAL MEDICINE

## 2022-08-01 PROCEDURE — 99214 PR OFFICE/OUTPT VISIT, EST, LEVL IV, 30-39 MIN: ICD-10-PCS | Mod: 25,S$GLB,, | Performed by: INTERNAL MEDICINE

## 2022-08-01 PROCEDURE — 99214 OFFICE O/P EST MOD 30 MIN: CPT | Mod: 25,S$GLB,, | Performed by: INTERNAL MEDICINE

## 2022-08-01 PROCEDURE — 1125F PR PAIN SEVERITY QUANTIFIED, PAIN PRESENT: ICD-10-PCS | Mod: CPTII,S$GLB,, | Performed by: INTERNAL MEDICINE

## 2022-08-01 PROCEDURE — 99999 PR PBB SHADOW E&M-EST. PATIENT-LVL V: CPT | Mod: PBBFAC,,, | Performed by: INTERNAL MEDICINE

## 2022-08-01 RX ORDER — SERTRALINE HYDROCHLORIDE 100 MG/1
100 TABLET, FILM COATED ORAL DAILY
Qty: 90 TABLET | Refills: 3 | Status: SHIPPED | OUTPATIENT
Start: 2022-08-01

## 2022-08-01 RX ORDER — HYDRALAZINE HYDROCHLORIDE 10 MG/1
10 TABLET, FILM COATED ORAL 2 TIMES DAILY
Qty: 60 TABLET | Refills: 11 | Status: SHIPPED | OUTPATIENT
Start: 2022-08-01 | End: 2023-01-01

## 2022-08-01 RX ORDER — AMITRIPTYLINE HYDROCHLORIDE 25 MG/1
25-50 TABLET, FILM COATED ORAL NIGHTLY PRN
Qty: 60 TABLET | Refills: 11 | Status: SHIPPED | OUTPATIENT
Start: 2022-08-01 | End: 2023-01-01

## 2022-08-01 NOTE — PROGRESS NOTES
Prevnar 13 given into left deltoid.  Vaccine verified with Martha STAPLETON LPN.  2 patient identifier used. well tolerated. Instructed to wait in lobby 15 minutes post injection for safety and what s/s to notify registration with.  See Immunization or MAR for NDC, Lot, and Expiration date.

## 2022-08-01 NOTE — PROGRESS NOTES
Subjective:       Patient ID: Amanda Smith is a 71 y.o. female.    Chief Complaint: MRI results     Here for routine health maintenance.      ESRD on peritoneal dialysis.       Temporal arteritis.  Stable.   Was diagnosed with temporal artery biopsy.   Does not have a rheumatologist.  Advised to establish care so available if steroids not working or frequency progresses.      Patient gets Dr fatigue, so ok for minimum follow up if prn steroids from me ok.      Normocytic anemia - on iv iron and Epogen      insomnia- uncontrolled on 200 mg trazodone.    Depression - mildly uncontrolled      pacemaker after syncope episode 2019.  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  Went into anaphylactic shock and had to be intubated. Carries Epipen.      CHF - new dx 12/2019 with admit.  No sob.  Dr Vincent Rooney      HTN - uncontrolled.  history in past BP log shows very wide varriations: 190/86 - 72/47.  She does not take her Coreg or nefidine if her sbp < 100 out of fear of passing out.  When it is low, she feels very fatigued and weak.  A lot of times, her high BP is related to pain.    She takes her torsemide regardless every morning. (see below log).  No consitent time for lows or highs.      Chronic low back pain - takes oxycodone about 2-3/ wk; phenergan as pain can cause nausea.  August had cervical neck fusion.  Helped but did not cure her pain.Currently in pain and just took pain med. Dr Ryan     COPD - no SOB    Smoked > 1 PPD > 20 yrs.      Review of Systems   Constitutional: Positive for fatigue. Negative for appetite change and fever.   HENT: Negative for nosebleeds and trouble swallowing.    Eyes: Negative for discharge and visual disturbance.   Respiratory: Negative for choking and shortness of breath.    Cardiovascular: Negative for chest pain and palpitations.   Gastrointestinal: Negative for abdominal pain, nausea and vomiting.   Musculoskeletal: Positive for back pain. Negative  for arthralgias and joint swelling.   Skin: Negative for rash and wound.   Neurological: Negative for dizziness and syncope.   Psychiatric/Behavioral: Negative for confusion and dysphoric mood.       Objective:      Vitals:    08/01/22 1326   BP: (!) 170/74   Pulse: 107   Temp: 99.1 °F (37.3 °C)     Physical Exam  Vitals reviewed.   Eyes:      Conjunctiva/sclera: Conjunctivae normal.   Neck:      Thyroid: No thyromegaly.      Trachea: Trachea normal.   Cardiovascular:      Heart sounds: Normal heart sounds.      Comments: Edema negative  Pulmonary:      Effort: Pulmonary effort is normal.      Breath sounds: Normal breath sounds.   Abdominal:      Palpations: Abdomen is soft. There is no hepatomegaly.   Musculoskeletal:      Cervical back: Normal range of motion.   Skin:     General: Skin is warm and dry.   Neurological:      Cranial Nerves: No cranial nerve deficit.      Comments: DTR decreased bilateral   Psychiatric:      Comments: Alert and Oriented            Assessment:       1. Routine physical examination    2. Secondary hyperparathyroidism, renal    3. Temporal arteritis    4. Nicotine dependence, uncomplicated, unspecified nicotine product type    5. Post-menopausal    6. ESRD (end stage renal disease)    7. Essential hypertension    8. Encounter for screening mammogram for breast cancer    9. Personal history of nicotine dependence     10. Vaccine counseling    11. Depression, unspecified depression type    12. Primary insomnia        Plan:       Routine physical examination    Secondary hyperparathyroidism, renal    Temporal arteritis    Nicotine dependence, uncomplicated, unspecified nicotine product type  -     CT Chest Lung Screening Low Dose; Future; Expected date: 08/01/2022    Post-menopausal  -     DXA Bone Density Spine And Hip; Future; Expected date: 08/01/2022    ESRD (end stage renal disease)    Essential hypertension  -     hydrALAZINE (APRESOLINE) 10 MG tablet; Take 1 tablet (10 mg total) by  mouth 2 (two) times a day.  Dispense: 60 tablet; Refill: 11    Encounter for screening mammogram for breast cancer  -     Mammo Digital Screening Bilat; Future; Expected date: 08/01/2022    Personal history of nicotine dependence   -     CT Chest Lung Screening Low Dose; Future; Expected date: 08/01/2022    Vaccine counseling  -     Pneumococcal Conjugate Vaccine (13 Valent) (IM)    Depression, unspecified depression type  -     sertraline (ZOLOFT) 100 MG tablet; Take 1 tablet (100 mg total) by mouth once daily.  Dispense: 90 tablet; Refill: 3  -     amitriptyline (ELAVIL) 25 MG tablet; Take 1-2 tablets (25-50 mg total) by mouth nightly as needed for Insomnia.  Dispense: 60 tablet; Refill: 11    Primary insomnia  -     amitriptyline (ELAVIL) 25 MG tablet; Take 1-2 tablets (25-50 mg total) by mouth nightly as needed for Insomnia.  Dispense: 60 tablet; Refill: 11            Medication List with Changes/Refills   New Medications    AMITRIPTYLINE (ELAVIL) 25 MG TABLET    Take 1-2 tablets (25-50 mg total) by mouth nightly as needed for Insomnia.    HYDRALAZINE (APRESOLINE) 10 MG TABLET    Take 1 tablet (10 mg total) by mouth 2 (two) times a day.   Current Medications    ASPIRIN (ECOTRIN) 81 MG EC TABLET    Take 81 mg by mouth once daily.    ATORVASTATIN (LIPITOR) 20 MG TABLET    Take 1 tablet (20 mg total) by mouth once daily.    BISACODYL ORAL    Take 10 mg by mouth daily as needed (constipation).    CALCITRIOL (ROCALTROL) 0.25 MCG CAP    TAKE 1 CAPSULE(0.25 MCG) BY MOUTH EVERY DAY    CALCIUM ACETATE,PHOSPHAT BIND, (PHOSLO) 667 MG CAPSULE    One po with meals and large snacks    CARVEDILOL (COREG) 25 MG TABLET    One po BID    CLONIDINE (CATAPRES) 0.1 MG TABLET    Take 1 tablet (0.1 mg total) by mouth 3 (three) times daily as needed (BP > 160/100).    EPINEPHRINE 0.3 MG/0.3 ML SYRG    Inject 1 each into the muscle once as needed (anaphylactic reaction).    FAMOTIDINE (PEPCID) 20 MG TABLET    Take 20 mg by mouth 2 (two)  times daily as needed.     FLUCONAZOLE (DIFLUCAN) 100 MG TABLET    One po QD X 2 days for yeast infection    FLUTICASONE PROPIONATE (FLONASE) 50 MCG/ACTUATION NASAL SPRAY    1 spray by Each Nostril route daily as needed for Allergies or Rhinitis.    HYDROCODONE-ACETAMINOPHEN (NORCO)  MG PER TABLET    Take 1 tablet by mouth every 6 (six) hours as needed for Pain.    LEVOCETIRIZINE (XYZAL) 5 MG TABLET    TAKE 1 TABLET(5 MG) BY MOUTH EVERY EVENING    MAGNESIUM OXIDE (MAG-OX) 400 MG (241.3 MG MAGNESIUM) TABLET    Take 1 tablet (400 mg total) by mouth once daily.    MELOXICAM (MOBIC) 15 MG TABLET    One po 2x a month prn arthritis pain    MONTELUKAST (SINGULAIR) 10 MG TABLET    Take 10 mg by mouth nightly as needed. Alternates with xyzal    MUPIROCIN (BACTROBAN) 2 % OINTMENT    Apply to PD catheter exit site daily    NIFEDIPINE (PROCARDIA-XL) 90 MG (OSM) 24 HR TABLET    TAKE 1 TABLET(90 MG) BY MOUTH EVERY EVENING    ONDANSETRON (ZOFRAN) 4 MG TABLET    Take 1 tablet (4 mg total) by mouth every 8 (eight) hours as needed for Nausea.    OXYCODONE-ACETAMINOPHEN (PERCOCET)  MG PER TABLET    Take 1 tablet by mouth every 4 (four) hours as needed for Pain.    PANTOPRAZOLE (PROTONIX) 40 MG TABLET    Take 1 tablet (40 mg total) by mouth once daily. For stomach acid    PREDNISONE (DELTASONE) 20 MG TABLET    Two po first day, then one po daily for 4 more days prn arteritis flare    PROMETHAZINE (PHENERGAN) 25 MG TABLET    Take 1 tablet (25 mg total) by mouth every 6 (six) hours as needed for Nausea.    CASANDRA-ELSA RX 1- MG-MG-MCG TAB    Take 1 tablet by mouth once daily.    SENNOSIDES (LAXATIVE, SENNOSIDES,) 25 MG TAB    Take 25 mg by mouth daily as needed (constipation).    TIZANIDINE (ZANAFLEX) 4 MG TABLET    Take 4 mg by mouth every 6 (six) hours as needed (muscle spasm).    TORSEMIDE (DEMADEX) 100 MG TAB    Take 1 tablet (100 mg total) by mouth every morning.    TRAZODONE (DESYREL) 100 MG TABLET    TAKE 2  TABLETS(200 MG) BY MOUTH EVERY NIGHT AS NEEDED FOR INSOMNIA    VALSARTAN (DIOVAN) 320 MG TABLET    Take 1 tablet (320 mg total) by mouth every evening. For BP and kidneys    VITAMIN RENAL FORMULA, B-COMPLEX-VITAMIN C-FOLIC ACID, (NEPHROCAPS) 1 MG CAP    Take 1 capsule by mouth once daily.   Changed and/or Refilled Medications    Modified Medication Previous Medication    SERTRALINE (ZOLOFT) 100 MG TABLET sertraline (ZOLOFT) 50 MG tablet       Take 1 tablet (100 mg total) by mouth once daily.    TAKE 1 TABLET(50 MG) BY MOUTH EVERY DAY   Discontinued Medications    NIFEDIPINE (PROCARDIA-XL) 60 MG (OSM) 24 HR TABLET    TAKE 1 TABLET(60 MG) BY MOUTH EVERY EVENING    SERTRALINE (ZOLOFT) 50 MG TABLET    TAKE 1 TABLET(50 MG) BY MOUTH EVERY DAY     Wellness reviewed      Consider increase hydralazine but permissive elevation with hx of dropping low.    Continue current management and monitor.    Counseled on regular exercise, maintenance of a healthy weight, balanced diet rich in fruits/vegetables and lean protein, and avoidance of unhealthy habits like smoking and excessive alcohol intake.   Also, counseled on importance of being compliant with medication, health appointments, diet and exercise.     Follow up in about 1 year (around 8/1/2023).  bp to me via email in 1 week - above

## 2022-08-02 ENCOUNTER — HOSPITAL ENCOUNTER (OUTPATIENT)
Dept: RADIOLOGY | Facility: HOSPITAL | Age: 72
Discharge: HOME | End: 2022-08-02
Attending: INTERNAL MEDICINE
Payer: MEDICARE

## 2022-08-02 DIAGNOSIS — T85.611A PERITONEAL DIALYSIS CATHETER DYSFUNCTION, INITIAL ENCOUNTER: ICD-10-CM

## 2022-08-02 PROCEDURE — 25500020 PHARM REV CODE 255: Performed by: INTERNAL MEDICINE

## 2022-08-02 PROCEDURE — 76000 FLUOROSCOPY <1 HR PHYS/QHP: CPT | Mod: TC

## 2022-08-02 RX ADMIN — IOHEXOL 20 ML: 240 INJECTION, SOLUTION INTRATHECAL; INTRAVASCULAR; INTRAVENOUS; ORAL at 12:08

## 2022-08-08 ENCOUNTER — PES CALL (OUTPATIENT)
Dept: ADMINISTRATIVE | Facility: CLINIC | Age: 72
End: 2022-08-08
Payer: MEDICARE

## 2022-08-15 ENCOUNTER — PES CALL (OUTPATIENT)
Dept: ADMINISTRATIVE | Facility: CLINIC | Age: 72
End: 2022-08-15
Payer: MEDICARE

## 2022-08-16 ENCOUNTER — PES CALL (OUTPATIENT)
Dept: ADMINISTRATIVE | Facility: CLINIC | Age: 72
End: 2022-08-16
Payer: MEDICARE

## 2022-08-25 ENCOUNTER — TELEPHONE (OUTPATIENT)
Dept: NEPHROLOGY | Facility: CLINIC | Age: 72
End: 2022-08-25
Payer: MEDICARE

## 2022-08-25 DIAGNOSIS — T85.611A PERITONEAL DIALYSIS CATHETER DYSFUNCTION, INITIAL ENCOUNTER: Primary | ICD-10-CM

## 2022-08-25 NOTE — TELEPHONE ENCOUNTER
PD pt, I placed referral for Gen Surgery for PD catheter evaluation. Please send referral to Angel Lezama. TY

## 2022-09-06 ENCOUNTER — PES CALL (OUTPATIENT)
Dept: ADMINISTRATIVE | Facility: CLINIC | Age: 72
End: 2022-09-06
Payer: MEDICARE

## 2022-09-06 ENCOUNTER — TELEPHONE (OUTPATIENT)
Dept: ADMINISTRATIVE | Facility: CLINIC | Age: 72
End: 2022-09-06
Payer: MEDICARE

## 2022-09-12 ENCOUNTER — TELEPHONE (OUTPATIENT)
Dept: ADMINISTRATIVE | Facility: CLINIC | Age: 72
End: 2022-09-12
Payer: MEDICARE

## 2022-09-12 ENCOUNTER — PATIENT MESSAGE (OUTPATIENT)
Dept: ADMINISTRATIVE | Facility: CLINIC | Age: 72
End: 2022-09-12
Payer: MEDICARE

## 2022-09-12 NOTE — TELEPHONE ENCOUNTER
Called pt; informed pt I was calling to confirm her virtual EAWV on 9/14/22 at 3:00pm and to see if she needed any help; pt stated she did not need any help and would complete e-pre check later today; pt informed to login 15 minutes prior to appt time; sent message through portal

## 2022-09-14 ENCOUNTER — TELEPHONE (OUTPATIENT)
Dept: ADMINISTRATIVE | Facility: CLINIC | Age: 72
End: 2022-09-14
Payer: MEDICARE

## 2022-09-15 ENCOUNTER — PES CALL (OUTPATIENT)
Dept: ADMINISTRATIVE | Facility: OTHER | Age: 72
End: 2022-09-15
Payer: MEDICARE

## 2022-09-21 ENCOUNTER — PES CALL (OUTPATIENT)
Dept: ADMINISTRATIVE | Facility: CLINIC | Age: 72
End: 2022-09-21
Payer: MEDICARE

## 2022-09-23 RX ORDER — CINACALCET 30 MG/1
TABLET, FILM COATED ORAL
Qty: 30 TABLET | Refills: 11 | Status: ON HOLD | OUTPATIENT
Start: 2022-09-23 | End: 2022-01-01 | Stop reason: HOSPADM

## 2022-09-27 ENCOUNTER — OFFICE VISIT (OUTPATIENT)
Dept: HOME HEALTH SERVICES | Facility: CLINIC | Age: 72
End: 2022-09-27
Payer: MEDICARE

## 2022-09-27 DIAGNOSIS — M31.6 GIANT CELL ARTERITIS: ICD-10-CM

## 2022-09-27 DIAGNOSIS — J44.9 CHRONIC OBSTRUCTIVE PULMONARY DISEASE, UNSPECIFIED COPD TYPE: ICD-10-CM

## 2022-09-27 DIAGNOSIS — K21.9 GASTROESOPHAGEAL REFLUX DISEASE WITHOUT ESOPHAGITIS: ICD-10-CM

## 2022-09-27 DIAGNOSIS — M79.7 FIBROMYALGIA: ICD-10-CM

## 2022-09-27 DIAGNOSIS — G89.4 CHRONIC PAIN SYNDROME: ICD-10-CM

## 2022-09-27 DIAGNOSIS — Z00.00 ENCOUNTER FOR PREVENTIVE HEALTH EXAMINATION: Primary | ICD-10-CM

## 2022-09-27 DIAGNOSIS — N25.81 SECONDARY HYPERPARATHYROIDISM, RENAL: ICD-10-CM

## 2022-09-27 DIAGNOSIS — N18.6 ESRD (END STAGE RENAL DISEASE): ICD-10-CM

## 2022-09-27 DIAGNOSIS — D50.8 OTHER IRON DEFICIENCY ANEMIA: ICD-10-CM

## 2022-09-27 DIAGNOSIS — I10 ESSENTIAL HYPERTENSION: ICD-10-CM

## 2022-09-27 DIAGNOSIS — Z99.2 DEPENDENCE ON RENAL DIALYSIS: ICD-10-CM

## 2022-09-27 DIAGNOSIS — E66.9 OBESITY (BMI 30.0-34.9): ICD-10-CM

## 2022-09-27 PROCEDURE — 1160F PR REVIEW ALL MEDS BY PRESCRIBER/CLIN PHARMACIST DOCUMENTED: ICD-10-PCS | Mod: CPTII,S$GLB,, | Performed by: NURSE PRACTITIONER

## 2022-09-27 PROCEDURE — 3288F PR FALLS RISK ASSESSMENT DOCUMENTED: ICD-10-PCS | Mod: CPTII,S$GLB,, | Performed by: NURSE PRACTITIONER

## 2022-09-27 PROCEDURE — 3008F PR BODY MASS INDEX (BMI) DOCUMENTED: ICD-10-PCS | Mod: CPTII,S$GLB,, | Performed by: NURSE PRACTITIONER

## 2022-09-27 PROCEDURE — 1101F PR PT FALLS ASSESS DOC 0-1 FALLS W/OUT INJ PAST YR: ICD-10-PCS | Mod: CPTII,S$GLB,, | Performed by: NURSE PRACTITIONER

## 2022-09-27 PROCEDURE — 1101F PT FALLS ASSESS-DOCD LE1/YR: CPT | Mod: CPTII,S$GLB,, | Performed by: NURSE PRACTITIONER

## 2022-09-27 PROCEDURE — G9919 SCRN ND POS ND PROV OF REC: HCPCS | Mod: CPTII,S$GLB,, | Performed by: NURSE PRACTITIONER

## 2022-09-27 PROCEDURE — 3288F FALL RISK ASSESSMENT DOCD: CPT | Mod: CPTII,S$GLB,, | Performed by: NURSE PRACTITIONER

## 2022-09-27 PROCEDURE — 1160F RVW MEDS BY RX/DR IN RCRD: CPT | Mod: CPTII,S$GLB,, | Performed by: NURSE PRACTITIONER

## 2022-09-27 PROCEDURE — 1159F PR MEDICATION LIST DOCUMENTED IN MEDICAL RECORD: ICD-10-PCS | Mod: CPTII,S$GLB,, | Performed by: NURSE PRACTITIONER

## 2022-09-27 PROCEDURE — G0439 PR MEDICARE ANNUAL WELLNESS SUBSEQUENT VISIT: ICD-10-PCS | Mod: S$GLB,,, | Performed by: NURSE PRACTITIONER

## 2022-09-27 PROCEDURE — 3077F SYST BP >= 140 MM HG: CPT | Mod: CPTII,S$GLB,, | Performed by: NURSE PRACTITIONER

## 2022-09-27 PROCEDURE — 3008F BODY MASS INDEX DOCD: CPT | Mod: CPTII,S$GLB,, | Performed by: NURSE PRACTITIONER

## 2022-09-27 PROCEDURE — 4010F PR ACE/ARB THEARPY RXD/TAKEN: ICD-10-PCS | Mod: CPTII,S$GLB,, | Performed by: NURSE PRACTITIONER

## 2022-09-27 PROCEDURE — G0439 PPPS, SUBSEQ VISIT: HCPCS | Mod: S$GLB,,, | Performed by: NURSE PRACTITIONER

## 2022-09-27 PROCEDURE — 99499 UNLISTED E&M SERVICE: CPT | Mod: S$GLB,,, | Performed by: NURSE PRACTITIONER

## 2022-09-27 PROCEDURE — 3079F PR MOST RECENT DIASTOLIC BLOOD PRESSURE 80-89 MM HG: ICD-10-PCS | Mod: CPTII,S$GLB,, | Performed by: NURSE PRACTITIONER

## 2022-09-27 PROCEDURE — 4010F ACE/ARB THERAPY RXD/TAKEN: CPT | Mod: CPTII,S$GLB,, | Performed by: NURSE PRACTITIONER

## 2022-09-27 PROCEDURE — 3079F DIAST BP 80-89 MM HG: CPT | Mod: CPTII,S$GLB,, | Performed by: NURSE PRACTITIONER

## 2022-09-27 PROCEDURE — 1159F MED LIST DOCD IN RCRD: CPT | Mod: CPTII,S$GLB,, | Performed by: NURSE PRACTITIONER

## 2022-09-27 PROCEDURE — 3077F PR MOST RECENT SYSTOLIC BLOOD PRESSURE >= 140 MM HG: ICD-10-PCS | Mod: CPTII,S$GLB,, | Performed by: NURSE PRACTITIONER

## 2022-09-27 PROCEDURE — G9919 PR SCREENING AND POSITIVE: ICD-10-PCS | Mod: CPTII,S$GLB,, | Performed by: NURSE PRACTITIONER

## 2022-09-30 VITALS
HEIGHT: 63 IN | WEIGHT: 178 LBS | HEART RATE: 80 BPM | OXYGEN SATURATION: 97 % | BODY MASS INDEX: 31.54 KG/M2 | DIASTOLIC BLOOD PRESSURE: 82 MMHG | SYSTOLIC BLOOD PRESSURE: 172 MMHG

## 2022-09-30 PROBLEM — R94.39 ABNORMAL NUCLEAR STRESS TEST: Status: RESOLVED | Noted: 2018-06-25 | Resolved: 2022-09-30

## 2022-09-30 PROBLEM — N17.9 AKI (ACUTE KIDNEY INJURY): Status: RESOLVED | Noted: 2021-03-23 | Resolved: 2022-09-30

## 2022-09-30 PROBLEM — K21.9 GASTROESOPHAGEAL REFLUX DISEASE WITHOUT ESOPHAGITIS: Status: ACTIVE | Noted: 2022-09-30

## 2022-09-30 NOTE — PROGRESS NOTES
"  Amanda Smith presented for a  Medicare AWV and comprehensive Health Risk Assessment today. The following components were reviewed and updated:    Medical history  Family History  Social history  Allergies and Current Medications  Health Risk Assessment  Health Maintenance  Care Team     Patient screened moderate and/or high risk for one or more social determinants of health (SDOH). Patient connected to community resources through the ED Navigator.      ** See Completed Assessments for Annual Wellness Visit within the encounter summary.**         The following assessments were completed:  Living Situation  CAGE  Depression Screening  Timed Get Up and Go  Whisper Test  Cognitive Function Screening  Nutrition Screening  ADL Screening  PAQ Screening        Vitals:    09/27/22 1010 09/27/22 1032   BP: (!) 206/102 (!) 172/82   Pulse: 87 80   SpO2:  97%   Weight: 80.7 kg (178 lb) 80.7 kg (178 lb)   Height: 5' 3" (1.6 m) 5' 3" (1.6 m)     Body mass index is 31.53 kg/m².  Physical Exam  Constitutional:       Appearance: Normal appearance.   HENT:      Head: Normocephalic and atraumatic.      Nose: Nose normal.   Eyes:      Extraocular Movements: Extraocular movements intact.      Pupils: Pupils are equal, round, and reactive to light.   Cardiovascular:      Rate and Rhythm: Normal rate and regular rhythm.      Pulses: Normal pulses.      Heart sounds: Normal heart sounds.   Pulmonary:      Effort: Pulmonary effort is normal.      Breath sounds: Normal breath sounds.   Musculoskeletal:      Cervical back: Normal range of motion and neck supple.   Neurological:      General: No focal deficit present.      Mental Status: She is alert and oriented to person, place, and time.             Diagnoses and health risks identified today and associated recommendations/orders:    1. Encounter for preventive health examination  Awv completed    2. Dependence on renal dialysis  Chronic and stable. Continue current treatment. Follow " with PCP.  Follows with renal     3. Chronic pain syndrome  Chronic and stable. Continue current treatment. Follow with PCP.  Patient on chronic opioids.   Risk factors reviewed for any potential opioid use disorder   Pain evaluated during visit.  Current treatment plan documented.  Will refer to specialist, as appropriate.        4. Chronic obstructive pulmonary disease, unspecified COPD type  Chronic and stable. Continue current treatment. Follow with PCP.      5. Fibromyalgia  Chronic and stable. Continue current treatment. Follow with PCP.      6. Essential hypertension  Chronic and unstable. Continue current treatment. Follow with PCP.  On meds - very labile    7. ESRD (end stage renal disease)  Chronic and stable. Continue current treatment. Follow with PCP.      8. Other iron deficiency anemia  Chronic and stable. Continue current treatment. Follow with PCP.      9. Secondary hyperparathyroidism, renal  Chronic and stable. Continue current treatment. Follow with PCP.    10. Obesity (BMI 30.0-34.9)  Chronic and stable. Continue current treatment. Follow with PCP.      11. Giant cell arteritis  Chronic and stable. Continue current treatment. Follow with PCP.      12. Gastroesophageal reflux disease without esophagitis  Chronic and stable. Continue current treatment. Follow with PCP.    Provided Amanda with a 5-10 year written screening schedule and personal prevention plan. Recommendations were developed using the USPSTF age appropriate recommendations. Education, counseling, and referrals were provided as needed. After Visit Summary printed and given to patient which includes a list of additional screenings\tests needed.    Fu in 1 yr for HUMZA Weaver NP      I offered to discuss advanced care planning, including how to pick a person who would make decisions for you if you were unable to make them for yourself, called a health care power of , and what kind of decisions you might  make such as use of life sustaining treatments such as ventilators and tube feeding when faced with a life limiting illness recorded on a living will that they will need to know. (How you want to be cared for as you near the end of your natural life)     X  Patient has advanced directives on file, which we reviewed, and they do not wish to make changes.

## 2022-09-30 NOTE — PATIENT INSTRUCTIONS
Counseling and Referral of Other Preventative  (Italic type indicates deductible and co-insurance are waived)    Patient Name: Amanda Smith  Today's Date: 9/30/2022    Health Maintenance       Date Due Completion Date    Mammogram Never done ---    Shingles Vaccine (1 of 2) Never done ---    DEXA Scan Never done ---    LDCT Lung Screen Never done ---    COVID-19 Vaccine (4 - Booster for Moderna series) 02/01/2022 12/7/2021    Influenza Vaccine (1) 09/01/2022 10/26/2021    Pneumococcal Vaccines (Age 65+) (2 - PPSV23 if available, else PCV20) 08/01/2023 8/1/2022    Lipid Panel 03/04/2026 3/4/2021    Colorectal Cancer Screening 03/28/2028 3/28/2018    TETANUS VACCINE 01/10/2029 1/10/2019        No orders of the defined types were placed in this encounter.    The following information is provided to all patients.  This information is to help you find resources for any of the problems found today that may be affecting your health:                Living healthy guide: www.[facility].[place].gov      Understanding Diabetes: www.diabetes.org      Eating healthy: www.cdc.gov/healthyweight      CDC home safety checklist: www.cdc.gov/steadi/patient.html      Agency on Aging: www.goea.[place].[facility]      Alcoholics anonymous (AA): www.aa.org      Physical Activity: www.stev[place].nih.gov/wc7marm      Tobacco use: www.quitwithusla.org

## 2022-10-21 NOTE — PROGRESS NOTES
CHW - Initial Contact    This Community Health Worker completed OR updated the Social Determinant of Health questionnaire with patient via telephone today.    Pt identified barriers of most importance are:  States she has no need for social service assistance. Referrals to community agencies completed with patient/caregiver consent outside of [facility] include: no  Referrals were put through [facility] - no  Support and Services: Will call if needed.  Other information discussed the patient needs / wants help with: none  Follow up required: no  No future outreach task assigned

## 2022-10-21 NOTE — PROGRESS NOTES
CHW - Case Closure    This Community Health Worker spoke to patient via telephone today.   Pt/Caregiver reported: Does not have any social service issues or concerns.  Pt/Caregiver denied any additional needs at this time and agrees with episode closure at this time.  Provided patient with Community Health Worker's contact information and encouraged him/her to contact this Community Health Worker if additional needs arise.

## 2022-11-14 NOTE — TELEPHONE ENCOUNTER
ESRD pt on Peritoneal Dialysis with tele visit today. Pt with nausea/vomiting. Also with cough which is interrupting speech. She has performed PD nightly. Given co morbidities advised going to ER now. Will reschedule PD clinic to review dialysis related issues.

## 2022-11-28 PROBLEM — J81.0 ACUTE PULMONARY EDEMA: Status: ACTIVE | Noted: 2022-01-01

## 2022-11-28 NOTE — TELEPHONE ENCOUNTER
Patient scheduled at [facility] on Thursday. No voicemail when called with updates. No activity on the patient portal. This appointment has been mailed to her as well as PD nurse updated.

## 2022-11-28 NOTE — TELEPHONE ENCOUNTER
PD pt at [facility] with abdominal mass, please schedule CT scan without IV contrast but yes po contrast in Cov. Thank you

## 2022-12-01 PROBLEM — R06.02 SOB (SHORTNESS OF BREATH): Status: ACTIVE | Noted: 2022-01-01

## 2022-12-05 NOTE — TELEPHONE ENCOUNTER
----- Message from Yisel Noriega sent at 12/5/2022  9:00 AM CST -----  Regarding: pt called  Name of Who is Calling: MARY ANNE LYNCH [[id number]]      What is the request in detail: pt is being discharged from hospital today and needs a 2 week f/u appt. Please advise       Can the clinic reply by [facility]: No      [facility] [phone]

## 2022-12-08 PROBLEM — Z99.2 PERITONEAL DIALYSIS CATHETER IN PLACE: Status: ACTIVE | Noted: 2022-01-01

## 2022-12-13 NOTE — PROGRESS NOTES
12/13/22 Attempt assessment with patient/caregiver. Pt declined OPCM at this time. Pt is not interested. She also declined HH.  RN OPCM  case closure.

## 2023-01-01 ENCOUNTER — TELEPHONE (OUTPATIENT)
Dept: FAMILY MEDICINE | Facility: CLINIC | Age: 73
End: 2023-01-01
Payer: MEDICARE

## 2023-01-01 ENCOUNTER — OFFICE VISIT (OUTPATIENT)
Dept: FAMILY MEDICINE | Facility: CLINIC | Age: 73
End: 2023-01-01
Payer: MEDICARE

## 2023-01-01 ENCOUNTER — TELEPHONE (OUTPATIENT)
Dept: NEPHROLOGY | Facility: CLINIC | Age: 73
End: 2023-01-01
Payer: MEDICARE

## 2023-01-01 VITALS
HEART RATE: 96 BPM | WEIGHT: 186.75 LBS | SYSTOLIC BLOOD PRESSURE: 150 MMHG | HEIGHT: 61 IN | OXYGEN SATURATION: 95 % | BODY MASS INDEX: 35.26 KG/M2 | TEMPERATURE: 99 F | DIASTOLIC BLOOD PRESSURE: 76 MMHG

## 2023-01-01 DIAGNOSIS — Z09 HOSPITAL DISCHARGE FOLLOW-UP: Primary | ICD-10-CM

## 2023-01-01 DIAGNOSIS — F51.01 PRIMARY INSOMNIA: ICD-10-CM

## 2023-01-01 DIAGNOSIS — M31.6 GIANT CELL ARTERITIS: ICD-10-CM

## 2023-01-01 DIAGNOSIS — G89.4 CHRONIC PAIN SYNDROME: ICD-10-CM

## 2023-01-01 DIAGNOSIS — J44.9 CHRONIC OBSTRUCTIVE PULMONARY DISEASE, UNSPECIFIED COPD TYPE: ICD-10-CM

## 2023-01-01 DIAGNOSIS — G89.29 CHRONIC MIDLINE LOW BACK PAIN WITHOUT SCIATICA: ICD-10-CM

## 2023-01-01 DIAGNOSIS — E66.01 SEVERE OBESITY (BMI 35.0-39.9) WITH COMORBIDITY: ICD-10-CM

## 2023-01-01 DIAGNOSIS — F32.A DEPRESSION, UNSPECIFIED DEPRESSION TYPE: ICD-10-CM

## 2023-01-01 DIAGNOSIS — F41.8 SITUATIONAL ANXIETY: ICD-10-CM

## 2023-01-01 DIAGNOSIS — I10 ESSENTIAL HYPERTENSION: ICD-10-CM

## 2023-01-01 DIAGNOSIS — I70.0 AORTIC ATHEROSCLEROSIS: ICD-10-CM

## 2023-01-01 DIAGNOSIS — M54.50 CHRONIC MIDLINE LOW BACK PAIN WITHOUT SCIATICA: ICD-10-CM

## 2023-01-01 DIAGNOSIS — N25.81 SECONDARY HYPERPARATHYROIDISM, RENAL: ICD-10-CM

## 2023-01-01 DIAGNOSIS — F32.0 CURRENT MILD EPISODE OF MAJOR DEPRESSIVE DISORDER WITHOUT PRIOR EPISODE: ICD-10-CM

## 2023-01-01 DIAGNOSIS — E87.6 HYPOKALEMIA: ICD-10-CM

## 2023-01-01 DIAGNOSIS — I95.0 IDIOPATHIC HYPOTENSION: ICD-10-CM

## 2023-01-01 PROCEDURE — 99215 OFFICE O/P EST HI 40 MIN: CPT | Mod: 95,,, | Performed by: INTERNAL MEDICINE

## 2023-01-01 PROCEDURE — 3078F PR MOST RECENT DIASTOLIC BLOOD PRESSURE < 80 MM HG: ICD-10-PCS | Mod: CPTII,S$GLB,, | Performed by: INTERNAL MEDICINE

## 2023-01-01 PROCEDURE — 1125F AMNT PAIN NOTED PAIN PRSNT: CPT | Mod: CPTII,S$GLB,, | Performed by: INTERNAL MEDICINE

## 2023-01-01 PROCEDURE — 1160F RVW MEDS BY RX/DR IN RCRD: CPT | Mod: CPTII,S$GLB,, | Performed by: INTERNAL MEDICINE

## 2023-01-01 PROCEDURE — 1159F PR MEDICATION LIST DOCUMENTED IN MEDICAL RECORD: ICD-10-PCS | Mod: CPTII,S$GLB,, | Performed by: INTERNAL MEDICINE

## 2023-01-01 PROCEDURE — 1101F PR PT FALLS ASSESS DOC 0-1 FALLS W/OUT INJ PAST YR: ICD-10-PCS | Mod: CPTII,S$GLB,, | Performed by: INTERNAL MEDICINE

## 2023-01-01 PROCEDURE — 3077F SYST BP >= 140 MM HG: CPT | Mod: CPTII,S$GLB,, | Performed by: INTERNAL MEDICINE

## 2023-01-01 PROCEDURE — 1159F MED LIST DOCD IN RCRD: CPT | Mod: CPTII,S$GLB,, | Performed by: INTERNAL MEDICINE

## 2023-01-01 PROCEDURE — 3008F BODY MASS INDEX DOCD: CPT | Mod: CPTII,S$GLB,, | Performed by: INTERNAL MEDICINE

## 2023-01-01 PROCEDURE — 4010F PR ACE/ARB THEARPY RXD/TAKEN: ICD-10-PCS | Mod: CPTII,S$GLB,, | Performed by: INTERNAL MEDICINE

## 2023-01-01 PROCEDURE — 3078F DIAST BP <80 MM HG: CPT | Mod: CPTII,S$GLB,, | Performed by: INTERNAL MEDICINE

## 2023-01-01 PROCEDURE — 4010F ACE/ARB THERAPY RXD/TAKEN: CPT | Mod: CPTII,S$GLB,, | Performed by: INTERNAL MEDICINE

## 2023-01-01 PROCEDURE — 3008F PR BODY MASS INDEX (BMI) DOCUMENTED: ICD-10-PCS | Mod: CPTII,S$GLB,, | Performed by: INTERNAL MEDICINE

## 2023-01-01 PROCEDURE — 99215 PR OFFICE/OUTPT VISIT, EST, LEVL V, 40-54 MIN: ICD-10-PCS | Mod: 95,,, | Performed by: INTERNAL MEDICINE

## 2023-01-01 PROCEDURE — 99215 PR OFFICE/OUTPT VISIT, EST, LEVL V, 40-54 MIN: ICD-10-PCS | Mod: S$GLB,,, | Performed by: INTERNAL MEDICINE

## 2023-01-01 PROCEDURE — 3288F PR FALLS RISK ASSESSMENT DOCUMENTED: ICD-10-PCS | Mod: CPTII,S$GLB,, | Performed by: INTERNAL MEDICINE

## 2023-01-01 PROCEDURE — 99999 PR PBB SHADOW E&M-EST. PATIENT-LVL V: ICD-10-PCS | Mod: PBBFAC,,, | Performed by: INTERNAL MEDICINE

## 2023-01-01 PROCEDURE — 3077F PR MOST RECENT SYSTOLIC BLOOD PRESSURE >= 140 MM HG: ICD-10-PCS | Mod: CPTII,S$GLB,, | Performed by: INTERNAL MEDICINE

## 2023-01-01 PROCEDURE — 3288F FALL RISK ASSESSMENT DOCD: CPT | Mod: CPTII,S$GLB,, | Performed by: INTERNAL MEDICINE

## 2023-01-01 PROCEDURE — 99999 PR PBB SHADOW E&M-EST. PATIENT-LVL V: CPT | Mod: PBBFAC,,, | Performed by: INTERNAL MEDICINE

## 2023-01-01 PROCEDURE — 4010F PR ACE/ARB THEARPY RXD/TAKEN: ICD-10-PCS | Mod: CPTII,95,, | Performed by: INTERNAL MEDICINE

## 2023-01-01 PROCEDURE — 99215 OFFICE O/P EST HI 40 MIN: CPT | Mod: S$GLB,,, | Performed by: INTERNAL MEDICINE

## 2023-01-01 PROCEDURE — 4010F ACE/ARB THERAPY RXD/TAKEN: CPT | Mod: CPTII,95,, | Performed by: INTERNAL MEDICINE

## 2023-01-01 PROCEDURE — 1125F PR PAIN SEVERITY QUANTIFIED, PAIN PRESENT: ICD-10-PCS | Mod: CPTII,S$GLB,, | Performed by: INTERNAL MEDICINE

## 2023-01-01 PROCEDURE — 1101F PT FALLS ASSESS-DOCD LE1/YR: CPT | Mod: CPTII,S$GLB,, | Performed by: INTERNAL MEDICINE

## 2023-01-01 PROCEDURE — 1160F PR REVIEW ALL MEDS BY PRESCRIBER/CLIN PHARMACIST DOCUMENTED: ICD-10-PCS | Mod: CPTII,S$GLB,, | Performed by: INTERNAL MEDICINE

## 2023-01-01 RX ORDER — AMITRIPTYLINE HYDROCHLORIDE 25 MG/1
25-50 TABLET, FILM COATED ORAL NIGHTLY PRN
Qty: 180 TABLET | Refills: 3 | Status: SHIPPED | OUTPATIENT
Start: 2023-01-01

## 2023-01-01 RX ORDER — METOCLOPRAMIDE 5 MG/1
TABLET ORAL
COMMUNITY
Start: 2023-01-01

## 2023-01-01 RX ORDER — ALPRAZOLAM 0.25 MG/1
TABLET ORAL
Qty: 15 TABLET | Refills: 0 | Status: SHIPPED | OUTPATIENT
Start: 2023-01-01

## 2023-01-01 RX ORDER — LEVOCETIRIZINE DIHYDROCHLORIDE 5 MG/1
5 TABLET, FILM COATED ORAL NIGHTLY
Qty: 90 TABLET | Refills: 0 | Status: SHIPPED | OUTPATIENT
Start: 2023-01-01 | End: 2023-01-01

## 2023-01-01 RX ORDER — PREDNISONE 20 MG/1
TABLET ORAL
Qty: 6 TABLET | Refills: 3 | Status: SHIPPED | OUTPATIENT
Start: 2023-01-01 | End: 2023-01-01 | Stop reason: SDUPTHER

## 2023-01-01 RX ORDER — LIDOCAINE AND PRILOCAINE 25; 25 MG/G; MG/G
CREAM TOPICAL
Qty: 30 G | Refills: 1 | Status: SHIPPED | OUTPATIENT
Start: 2023-01-01

## 2023-01-01 RX ORDER — HYDRALAZINE HYDROCHLORIDE 50 MG/1
TABLET, FILM COATED ORAL
Qty: 270 TABLET | Refills: 3 | Status: SHIPPED | OUTPATIENT
Start: 2023-01-01

## 2023-01-01 RX ORDER — METOPROLOL TARTRATE 25 MG/1
25 TABLET, FILM COATED ORAL 2 TIMES DAILY
COMMUNITY
Start: 2023-01-01

## 2023-01-01 RX ORDER — DULOXETIN HYDROCHLORIDE 30 MG/1
30 CAPSULE, DELAYED RELEASE ORAL DAILY
Qty: 30 CAPSULE | Refills: 11 | Status: SHIPPED | OUTPATIENT
Start: 2023-01-01 | End: 2024-08-10

## 2023-01-01 RX ORDER — LIDOCAINE AND PRILOCAINE 25; 25 MG/G; MG/G
CREAM TOPICAL
Qty: 30 G | Refills: 11 | Status: SHIPPED | OUTPATIENT
Start: 2023-01-01 | End: 2023-01-01 | Stop reason: SDUPTHER

## 2023-01-01 RX ORDER — ALPRAZOLAM 0.25 MG/1
TABLET ORAL
Qty: 15 TABLET | Refills: 0 | Status: SHIPPED | OUTPATIENT
Start: 2023-01-01 | End: 2023-01-01 | Stop reason: SDUPTHER

## 2023-01-01 RX ORDER — PREDNISONE 20 MG/1
TABLET ORAL
Qty: 6 TABLET | Refills: 3 | Status: SHIPPED | OUTPATIENT
Start: 2023-01-01

## 2023-01-01 RX ORDER — APIXABAN 2.5 MG/1
2.5 TABLET, FILM COATED ORAL 2 TIMES DAILY
COMMUNITY
Start: 2023-01-01

## 2023-01-01 RX ORDER — NIFEDIPINE 90 MG/1
TABLET, EXTENDED RELEASE ORAL
Qty: 90 TABLET | Refills: 1 | Status: SHIPPED | OUTPATIENT
Start: 2023-01-01

## 2023-01-01 RX ORDER — LEVOCETIRIZINE DIHYDROCHLORIDE 5 MG/1
5 TABLET, FILM COATED ORAL NIGHTLY
Qty: 90 TABLET | Refills: 3 | Status: SHIPPED | OUTPATIENT
Start: 2023-01-01

## 2023-02-23 NOTE — TELEPHONE ENCOUNTER
----- Message from Ayaka Wheeler sent at 2/23/2023  2:15 PM CST -----  Type: Needs Medical Advice  Who Called:  pt   Best Call Back Number: [phone] (home)     Additional Information: pt requesting a call back, about rx please advise thank you

## 2023-03-13 PROBLEM — J81.0 ACUTE PULMONARY EDEMA: Status: RESOLVED | Noted: 2022-01-01 | Resolved: 2023-01-01

## 2023-04-24 NOTE — TELEPHONE ENCOUNTER
----- Message from Shannon Suarez LPN sent at 4/24/2023  8:32 AM CDT -----  Regarding: RE: tien raines  Thank you!    ----- Message -----  From: Claudia Kaufman LPN  Sent: 4/21/2023   4:58 PM CDT  To: Shannon Suarez LPN  Subject: FW: tien Samson,  The pharmacy will fax the info over to complete the PA.  ----- Message -----  From: Anisha Ortiz  Sent: 4/21/2023  12:25 PM CDT  To: [facility] Staff  Subject: tien raines                                Pharmacy Calling to Clarify an RX:[facility]      Name of Caller:Chasidy       Pharmacy Name [facility]       Prescription Name ferric citrate (AURYXIA) 210 mg iron Tab       What do they need to clarify?[facility] and needs a nurse to call her back asap       Best Call Back Number [phone] [facility]         Additional Information:

## 2023-06-26 NOTE — TELEPHONE ENCOUNTER
Patient was called back and requested a refill on Prednisone 20 mg. She reports having a flare up with temporal arthritis and is in pain.

## 2023-06-26 NOTE — TELEPHONE ENCOUNTER
----- Message from Justin Gordon sent at 6/26/2023  8:09 AM CDT -----  Regarding: refill  Type: Needs Medical Advice  Who Called:  pt    Best Call Back Number: [phone]    Additional Information: personal pt states that she's going out of town Wednesday, and need to discuss med with doctor or nurse. Please call to discuss.

## 2023-06-29 NOTE — TELEPHONE ENCOUNTER
----- Message from Twyla Mir sent at 6/29/2023  7:14 AM CDT -----  Contact: Patient's home health nurse  Type: Needs Medical Advice    Who Called:  Patient's home health care nurse (with [facility])  What is this regarding?:  The Pt's BP is very high and is currently 190/92 and she just took her Clonidine.  Best Call Back Number:  Ms. Janet PERRY at [phone]  Additional Information:  Please call the patient's home health nurse back at the phone number listed above to advise. Thank you!

## 2023-06-29 NOTE — TELEPHONE ENCOUNTER
Before clonidine it was 190/92. Nurse gave clonidine and sat with her, it went to 176/86. Nurse told her to call her back in 1 to 2 hours. She will call us back to let us know.    Patient was just released from hospital and they had to give her medicine through IV to keep pressure down.     Nurse stated they will draw labs next week as she just had them done at hospital.     Please advise

## 2023-08-09 NOTE — TELEPHONE ENCOUNTER
No care due was identified.  [facility] Embedded Care Due Messages. Reference number: [id number].   8/09/2023 3:49:35 PM CDT

## 2023-08-10 NOTE — TELEPHONE ENCOUNTER
Spoke with emelyn and they are getting with the  in order to see if the orders was suppose to go to Dr. Weathers then they will be in contact.

## 2023-08-11 PROBLEM — G89.29 CHRONIC MIDLINE LOW BACK PAIN WITHOUT SCIATICA: Status: ACTIVE | Noted: 2023-01-01

## 2023-08-11 PROBLEM — M54.50 CHRONIC MIDLINE LOW BACK PAIN WITHOUT SCIATICA: Status: ACTIVE | Noted: 2023-01-01

## 2023-08-11 NOTE — PROGRESS NOTES
The patient location is: [place]  The chief complaint leading to consultation is: pain  Visit type: Virtual visit with synchronous audio and video  Total time spent with patient: 14 min  Each patient to whom he or she provides medical services by telemedicine is:  (1) informed of the relationship between the physician and patient and the respective role of any other health care provider with respect to management of the patient; and (2) notified that he or she may decline to receive medical services by telemedicine and may withdraw from such care at any time.    Notes:     Patient not seen by  me in 1 year.    Recently DC from [facility] for hyperkalemia.  Had pacemaker changed  COVID infection, which led to sepsis.    ESRD - HD  M, W, F.  Was on peritoneal dialysis until developed a complication.      HTN - very variable.  Ranges from sbp 210 - 90.  Higher with uncontrolled pain.   history in past BP log shows very wide varriations: 190/86 - 72/47.  She does not take her Coreg or nefidine if her sbp < 100 out of fear of passing out.  When it is low, she feels very fatigued and weak.  A lot of times, her high BP is related to pain.    She takes her torsemide regardless every morning. (see below log).  No consitent time for lows or highs.     CAD - no chest pain.  Dr Rooney.   December - NSTEMI  pacemaker after syncope episode 2019.  Dr Rooney   Went into anaphylactic shock and had to be intubated. Carries Epipen.   CHF -  No sob.     Temporal arteritis.  Stable.   Was diagnosed with temporal artery biopsy.   Does not have a rheumatologist.  Advised to establish care so available if steroids not working or frequency progresses.       Patient gets Dr fatigue, so ok for minimum follow up if prn steroids from me ok once controlled.      Normocytic anemia - on iv iron and Epogen      insomnia- uncontrolled on 200 mg trazodone.     Depression - mildly uncontrolled     Chronic low back pain - uncontrolled.   Would like to retry Cymbalta  - previous had n/v with it.   takes oxycodone about 2-3/ wk; phenergan as pain can cause nausea.  Hx cervical neck fusion.  Helped but did not cure her pain.  Dr Ryan     COPD - no SOB  Smoked > 1 PPD > 20 yrs.           Review of Systems      Physical Exam      Assessment:       1. Hospital discharge follow-up    2. Essential hypertension    3. Hypokalemia    4. Idiopathic hypotension    5. Chronic pain syndrome    6. Chronic midline low back pain without sciatica    7. Depression, unspecified depression type        Plan:       Hospital discharge follow-up    Essential hypertension    Hypokalemia    Idiopathic hypotension    Chronic pain syndrome  -     DULoxetine (CYMBALTA) 30 MG capsule; Take 1 capsule (30 mg total) by mouth once daily.  Dispense: 30 capsule; Refill: 11    Chronic midline low back pain without sciatica  -     DULoxetine (CYMBALTA) 30 MG capsule; Take 1 capsule (30 mg total) by mouth once daily.  Dispense: 30 capsule; Refill: 11    Depression, unspecified depression type  -     DULoxetine (CYMBALTA) 30 MG capsule; Take 1 capsule (30 mg total) by mouth once daily.  Dispense: 30 capsule; Refill: 11            Medication List with Changes/Refills   New Medications    DULOXETINE (CYMBALTA) 30 MG CAPSULE    Take 1 capsule (30 mg total) by mouth once daily.   Current Medications    ALPRAZOLAM (XANAX) 0.25 MG TABLET    One po X 1 prn severe anxiety while on dialysis 3X a week    AMITRIPTYLINE (ELAVIL) 25 MG TABLET    Take 1-2 tablets (25-50 mg total) by mouth nightly as needed for Insomnia.    ASPIRIN (ECOTRIN) 81 MG EC TABLET    Take 81 mg by mouth once daily.    ATORVASTATIN (LIPITOR) 20 MG TABLET    Take 1 tablet (20 mg total) by mouth once daily.    BISACODYL ORAL    Take 10 mg by mouth daily as needed (constipation).    CALCITRIOL (ROCALTROL) 0.25 MCG CAP    TAKE 1 CAPSULE(0.25 MCG) BY MOUTH EVERY DAY    CARVEDILOL (COREG) 25 MG TABLET    One po BID    CINACALCET (SENSIPAR) 30 MG TAB    Take 1  tablet (30 mg total) by mouth daily with breakfast.    CLONIDINE (CATAPRES) 0.1 MG TABLET    Take 1 tablet (0.1 mg total) by mouth daily as needed (160).    EPINEPHRINE 0.3 MG/0.3 ML SYRG    Inject 1 each into the muscle once as needed (anaphylactic reaction).    FAMOTIDINE (PEPCID) 20 MG TABLET    Take 20 mg by mouth 2 (two) times daily as needed.     FERRIC CITRATE (AURYXIA) 210 MG IRON TAB    Two po TID with meals and one with snacks BID, phos binder    FLUTICASONE PROPIONATE (FLONASE) 50 MCG/ACTUATION NASAL SPRAY    1 spray by Each Nostril route daily as needed for Allergies or Rhinitis.    HYDRALAZINE (APRESOLINE) 50 MG TABLET    TAKE 1 TABLET BY MOUTH THREE TIMES DAILY    LEVOCETIRIZINE (XYZAL) 5 MG TABLET    Take 1 tablet (5 mg total) by mouth every evening.    LIDOCAINE-PRILOCAINE (EMLA) CREAM    Apply to AV dialysis access 30min prior and loosely wrap with cling film    MAGNESIUM OXIDE (MAG-OX) 400 MG (241.3 MG MAGNESIUM) TABLET    Take 1 tablet (400 mg total) by mouth once daily.    MELOXICAM (MOBIC) 15 MG TABLET    One po 2x a month prn arthritis pain    MONTELUKAST (SINGULAIR) 10 MG TABLET    Take 10 mg by mouth nightly as needed. Alternates with xyzal    MUPIROCIN (BACTROBAN) 2 % OINTMENT    Apply to PD catheter exit site daily    NIFEDIPINE (PROCARDIA-XL) 90 MG (OSM) 24 HR TABLET    TAKE 1 TABLET(90 MG) BY MOUTH EVERY EVENING    ONDANSETRON (ZOFRAN) 4 MG TABLET    TAKE 1 TABLET(4 MG) BY MOUTH EVERY 8 HOURS AS NEEDED FOR NAUSEA    OXYCODONE-ACETAMINOPHEN (PERCOCET)  MG PER TABLET    Take 1 tablet by mouth every 4 (four) hours as needed for Pain.    PREDNISONE (DELTASONE) 20 MG TABLET    Two po first day, then one po daily for 4 more days prn arteritis flare    PROMETHAZINE (PHENERGAN) 25 MG TABLET    Take 1 tablet (25 mg total) by mouth every 6 (six) hours as needed for Nausea.    CASANDRA-ELSA RX 1- MG-MG-MCG TAB    Take 1 tablet by mouth once daily.    SENNOSIDES (LAXATIVE, SENNOSIDES,) 25 MG  TAB    Take 25 mg by mouth daily as needed (constipation).    SERTRALINE (ZOLOFT) 100 MG TABLET    Take 1 tablet (100 mg total) by mouth once daily.    SODIUM ZIRCONIUM CYCLOSILICATE (LOKELMA) 10 GRAM PACKET    Take 1 packet (10 g total) by mouth once daily. Mix entire contents of packet into drinking glass containing 3 tablespoons of water; stir well & drink immediately. Add water and repeat until no powder remains to receive entire dose.    TIZANIDINE (ZANAFLEX) 4 MG TABLET    Take 4 mg by mouth every 6 (six) hours as needed (muscle spasm).    TORSEMIDE (DEMADEX) 100 MG TAB    Take 1 tablet (100 mg total) by mouth every morning.    VALSARTAN (DIOVAN) 320 MG TABLET    TAKE 1 TABLET BY MOUTH EVERY EVENING FOR BLOOD PRESSURE AND KIDNEYS    VITAMIN RENAL FORMULA, B-COMPLEX-VITAMIN C-FOLIC ACID, (NEPHROCAPS) 1 MG CAP    Take 1 capsule by mouth once daily.       Continue current management and monitor.    Counseled on regular exercise, maintenance of a healthy weight, balanced diet rich in fruits/vegetables and lean protein, and avoidance of unhealthy habits like smoking and excessive alcohol intake.   Also, counseled on importance of being compliant with medication, health appointments, diet and exercise.     Follow up in about 1 month (around 9/11/2023).  Cymbalta, other.     Total time spent on patient's needs today in preparing for the visit, the actual visit including counseling, managing the patient's needs and electronic record documentation was more than 40 minutes

## 2023-08-23 NOTE — TELEPHONE ENCOUNTER
Refill Decision Note      Refill Decision Note   Amanda Smith  is requesting a refill authorization.  Brief Assessment and Rationale for Refill:  Approve     Medication Therapy Plan:         Pharmacist review requested: Yes   Extended chart review required: Yes   Comments:     Note composed:11:50 AM 08/23/2023             Appointments     Last Visit   8/11/2023 Ulices Gaitan MD   Next Visit   9/13/2023 Ulices Gaitan MD

## 2023-08-23 NOTE — TELEPHONE ENCOUNTER
Refill Routing Note   Medication(s) are not appropriate for processing by [facility] for the following reason(s):      Drug-disease interaction    [facility] action(s):  Defer Care Due:  None identified     Medication Therapy Plan:  Drug-Disease: amitriptyline and LBBB (left bundle branch block);  Drug-Disease: levocetirizine and ESRD (end stage renal disease); Peritoneal dialysis catheter in place      Pharmacist review requested: Yes     Appointments  past 12m or future 3m with PCP    Date Provider   Last Visit   8/11/2023 Ulices Gaitan MD   Next Visit   9/13/2023 Ulices Gaitan MD   ED visits in past 90 days: 0        Note composed:10:26 AM 08/23/2023

## 2023-08-23 NOTE — TELEPHONE ENCOUNTER
No care due was identified.  [facility] Embedded Care Due Messages. Reference number: [id number].   8/23/2023 5:51:45 AM CDT

## 2023-09-06 NOTE — TELEPHONE ENCOUNTER
sts she needs to reschedule her appt for 9/13 due to it conflicting with her dialysis appt--please advise and thank you     She is on a MWF schedule.   Can we work her in?

## 2023-09-06 NOTE — TELEPHONE ENCOUNTER
----- Message from Celestina Mars sent at 9/6/2023  9:41 AM CDT -----  Regarding: sooner appt  Contact: pt  Type:  Sooner Apoointment Request    Caller is requesting a sooner appointment.  Caller declined first available appointment listed below.  Caller will not accept being placed on the waitlist and is requesting a message be sent to doctor.    Name of Caller:pt    When is the first available appointment?10/2    Would the patient rather a call back or a response via [facility]? Call back    Best Call Back Number:[phone]    Additional Information: sts she needs to reschedule her appt for 9/13 due to it conflicting with her dialysis appt--please advise and thank you

## 2023-09-12 PROBLEM — I70.0 AORTIC ATHEROSCLEROSIS: Status: ACTIVE | Noted: 2023-01-01

## 2023-09-12 NOTE — PROGRESS NOTES
Subjective:       Patient ID: Amanda Smith is a 73 y.o. female.  Chief Complaint: Hospital Follow Up and Anxiety     HPI        Recently DC from [facility] again since my last visit with her 1 month ago due to dialysis problem per patient.  Was started on eliquis 2.5 bid - she does not know why, but will need it refilled.     Had pacemaker changed over summer      ESRD - HD  M, W, F.  sbp 210 before HD and will drop to 68/30 on HD  Was on peritoneal dialysis until developed a complication.       Situational anxiety - gets panic attacks on HD.  Controlled with prn Xanax.    HTN - very variable.  Ranges from sbp 210 - 90.  sbp 210 before HD and will drop to 68/30 on HD.  Higher with uncontrolled pain.   history in past BP log shows very wide varriations: 190/86 - 72/47.  She does not take her Coreg or nefidine if her sbp < 100 out of fear of passing out.  When it is low, she feels very fatigued and weak.  A lot of times, her high BP is related to pain.    She takes her torsemide regardless every morning. (see below log).  No consitent time for lows or highs.      CAD - no chest pain.  Dr Rooney.   December - NSTEMI  pacemaker after syncope episode 2019.  Dr Rooney   Went into anaphylactic shock and had to be intubated. Carries Epipen.   CHF -  No sob.     Temporal arteritis.  Stable.   Was diagnosed with temporal artery biopsy.   Does not have a rheumatologist.  Advised to establish care so available if steroids not working or frequency progresses.       Patient gets Dr fatigue, so ok for minimum follow up if prn steroids from me ok once controlled.      Normocytic anemia - on iv iron and Epogen      insomnia- uncontrolled on 200 mg trazodone.     Depression - mildly uncontrolled     Chronic low back pain - uncontrolled.   Would like to retry Cymbalta - previous had n/v with it.   takes oxycodone about 2-3/ wk; phenergan as pain can cause nausea.  Hx cervical neck fusion.  Helped but did not cure her pain.  Dr Ryan      COPD - no SOB  Smoked > 1 PPD > 20 yrs.      Secondary hyperparathyroid - 2nd CKD; stable  Obesity - severe.    Refuses screenings currently with medical burden         Assessment:       1. Hospital discharge follow-up    2. Essential hypertension    3. Aortic atherosclerosis    4. Secondary hyperparathyroidism, renal    5. Giant cell arteritis    6. Chronic obstructive pulmonary disease, unspecified COPD type    7. Current mild episode of major depressive disorder without prior episode    8. Situational anxiety    9. Severe obesity (BMI 35.0-39.9) with comorbidity        Plan:       Hospital discharge follow-up    Essential hypertension    Aortic atherosclerosis    Secondary hyperparathyroidism, renal    Giant cell arteritis  -     predniSONE (DELTASONE) 20 MG tablet; Two po first day, then one po daily for 4 more days prn arteritis flare  Dispense: 6 tablet; Refill: 3    Chronic obstructive pulmonary disease, unspecified COPD type    Current mild episode of major depressive disorder without prior episode    Situational anxiety    Severe obesity (BMI 35.0-39.9) with comorbidity    Other orders  -     ALPRAZolam (XANAX) 0.25 MG tablet; One po X 1 prn severe anxiety while on dialysis 3X a week  Dispense: 15 tablet; Refill: 0            Why Eliguis?  Not in Afib.  Was not told had DVT/PE; advised to reach out to cardiology to refill unless I can get records to id why on.    Will try to get records.     Continue current management and monitor.  Other diagnoses were reviewed and found stable and will continue to monitor.  Counseled on regular exercise, maintenance of a healthy weight, balanced diet rich in fruits/vegetables and lean protein, and avoidance of unhealthy habits like smoking and excessive alcohol intake.   Also, counseled on importance of being compliant with medication, health appointments, diet and exercise.     Follow up in about 3 months (around 12/12/2023).    Total time spent on patient's needs  today in preparing for the visit, the actual visit including counseling, managing the patient's needs and electronic record documentation was more than 40 minutes      Medication List with Changes/Refills   Current Medications    AMITRIPTYLINE (ELAVIL) 25 MG TABLET    Take 1-2 tablets (25-50 mg total) by mouth nightly as needed for Insomnia.    ASPIRIN (ECOTRIN) 81 MG EC TABLET    Take 81 mg by mouth once daily.    ATORVASTATIN (LIPITOR) 20 MG TABLET    Take 1 tablet (20 mg total) by mouth once daily.    BISACODYL ORAL    Take 10 mg by mouth daily as needed (constipation).    CALCITRIOL (ROCALTROL) 0.25 MCG CAP    TAKE 1 CAPSULE(0.25 MCG) BY MOUTH EVERY DAY    CARVEDILOL (COREG) 25 MG TABLET    One po BID    CINACALCET (SENSIPAR) 30 MG TAB    Take 1 tablet (30 mg total) by mouth daily with breakfast.    CLONIDINE (CATAPRES) 0.1 MG TABLET    Take 1 tablet (0.1 mg total) by mouth daily as needed (160).    DULOXETINE (CYMBALTA) 30 MG CAPSULE    Take 1 capsule (30 mg total) by mouth once daily.    ELIQUIS 2.5 MG TAB    Take 2.5 mg by mouth 2 (two) times daily.    EPINEPHRINE 0.3 MG/0.3 ML SYRG    Inject 1 each into the muscle once as needed (anaphylactic reaction).    FAMOTIDINE (PEPCID) 20 MG TABLET    Take 20 mg by mouth 2 (two) times daily as needed.     FERRIC CITRATE (AURYXIA) 210 MG IRON TAB    Two po TID with meals and one with snacks BID, phos binder    FLUTICASONE PROPIONATE (FLONASE) 50 MCG/ACTUATION NASAL SPRAY    1 spray by Each Nostril route daily as needed for Allergies or Rhinitis.    HYDRALAZINE (APRESOLINE) 50 MG TABLET    TAKE 1 TABLET BY MOUTH THREE TIMES DAILY    LEVOCETIRIZINE (XYZAL) 5 MG TABLET    TAKE 1 TABLET(5 MG) BY MOUTH EVERY EVENING    LIDOCAINE-PRILOCAINE (EMLA) CREAM    Apply to AV dialysis access 30min prior and loosely wrap with cling film    MAGNESIUM OXIDE (MAG-OX) 400 MG (241.3 MG MAGNESIUM) TABLET    Take 1 tablet (400 mg total) by mouth once daily.    MELOXICAM (MOBIC) 15 MG TABLET     One po 2x a month prn arthritis pain    METOCLOPRAMIDE HCL (REGLAN) 5 MG TABLET    Take by mouth.    METOPROLOL TARTRATE (LOPRESSOR) 25 MG TABLET    Take 25 mg by mouth 2 (two) times daily.    MONTELUKAST (SINGULAIR) 10 MG TABLET    Take 10 mg by mouth nightly as needed. Alternates with xyzal    MUPIROCIN (BACTROBAN) 2 % OINTMENT    Apply to PD catheter exit site daily    NIFEDIPINE (PROCARDIA-XL) 90 MG (OSM) 24 HR TABLET    TAKE 1 TABLET(90 MG) BY MOUTH EVERY EVENING    ONDANSETRON (ZOFRAN) 4 MG TABLET    TAKE 1 TABLET(4 MG) BY MOUTH EVERY 8 HOURS AS NEEDED FOR NAUSEA    OXYCODONE-ACETAMINOPHEN (PERCOCET)  MG PER TABLET    Take 1 tablet by mouth every 4 (four) hours as needed for Pain.    PREDNISONE (DELTASONE) 20 MG TABLET    Two po first day, then one po daily for 4 more days prn arteritis flare    PROMETHAZINE (PHENERGAN) 25 MG TABLET    Take 1 tablet (25 mg total) by mouth every 6 (six) hours as needed for Nausea.    CASANDRA-ELSA RX 1- MG-MG-MCG TAB    Take 1 tablet by mouth once daily.    SENNOSIDES (LAXATIVE, SENNOSIDES,) 25 MG TAB    Take 25 mg by mouth daily as needed (constipation).    SERTRALINE (ZOLOFT) 100 MG TABLET    Take 1 tablet (100 mg total) by mouth once daily.    SODIUM ZIRCONIUM CYCLOSILICATE (LOKELMA) 10 GRAM PACKET    Take 1 packet (10 g total) by mouth once daily. Mix entire contents of packet into drinking glass containing 3 tablespoons of water; stir well & drink immediately. Add water and repeat until no powder remains to receive entire dose.    TIZANIDINE (ZANAFLEX) 4 MG TABLET    Take 4 mg by mouth every 6 (six) hours as needed (muscle spasm).    TORSEMIDE (DEMADEX) 100 MG TAB    Take 1 tablet (100 mg total) by mouth every morning.    VALSARTAN (DIOVAN) 320 MG TABLET    TAKE 1 TABLET BY MOUTH EVERY EVENING FOR BLOOD PRESSURE AND KIDNEYS    VITAMIN RENAL FORMULA, B-COMPLEX-VITAMIN C-FOLIC ACID, (NEPHROCAPS) 1 MG CAP    Take 1 capsule by mouth once daily.   Changed and/or  Refilled Medications    Modified Medication Previous Medication    ALPRAZOLAM (XANAX) 0.25 MG TABLET ALPRAZolam (XANAX) 0.25 MG tablet       One po X 1 prn severe anxiety while on dialysis 3X a week    One po X 1 prn severe anxiety while on dialysis 3X a week       BP Readings from Last 3 Encounters:   09/12/23 (!) 150/76   12/09/22 (!) 175/69   09/27/22 (!) 172/82     Hemoglobin A1C   Date Value Ref Range Status   03/04/2021 5.4 4.0 - 5.6 % Final     Comment:     ADA Screening Guidelines:  5.7-6.4%  Consistent with prediabetes  >or=6.5%  Consistent with diabetes    High levels of fetal hemoglobin interfere with the HbA1C  assay. Heterozygous hemoglobin variants (HbS, HgC, etc)do  not significantly interfere with this assay.   However, presence of multiple variants may affect accuracy.     06/21/2018 5.2 4.0 - 5.6 % Final     Comment:     ADA Screening Guidelines:  5.7-6.4%  Consistent with prediabetes  >or=6.5%  Consistent with diabetes  High levels of fetal hemoglobin interfere with the HbA1C  assay. Heterozygous hemoglobin variants (HbS, HgC, etc)do  not significantly interfere with this assay.   However, presence of multiple variants may affect accuracy.     04/10/2017 6.1 4.5 - 6.2 % Final     Comment:     According to ADA guidelines, hemoglobin A1C <7.0% represents  optimal control in non-pregnant diabetic patients.  Different  metrics may apply to specific populations.   Standards of Medical Care in Diabetes - 2016.  For the purpose of screening for the presence of diabetes:  <5.7%     Consistent with the absence of diabetes  5.7-6.4%  Consistent with increasing risk for diabetes   (prediabetes)  >or=6.5%  Consistent with diabetes  Currently no consensus exists for use of hemoglobin A1C  for diagnosis of diabetes for children.       Lab Results   Component Value Date    TSH 2.382 03/04/2021     Lab Results   Component Value Date    LDLCALC 136.8 03/04/2021    LDLCALC 102.8 01/24/2020    LDLCALC 101.2  06/21/2018     Lab Results   Component Value Date    TRIG 191 (H) 03/04/2021    TRIG 386 (H) 01/24/2020    TRIG 134 06/21/2018     Wt Readings from Last 3 Encounters:   09/12/23 84.7 kg (186 lb 11.7 oz)   12/08/22 83.7 kg (184 lb 8.4 oz)   09/27/22 80.7 kg (178 lb)     Lab Results   Component Value Date    HGB 9.4 (L) 12/08/2022    HCT 29.4 (L) 12/08/2022    WBC 10.98 12/08/2022    ALT 11 12/08/2022    AST 35 12/08/2022     (L) 12/09/2022    K 6.1 (H) 12/09/2022    CREATININE 4.99 (H) 12/09/2022           Review of Systems        Objective:      Vitals:    09/12/23 1301   BP: (!) 150/76   Pulse: 96   Temp: 98.5 °F (36.9 °C)     Physical Exam  Vitals reviewed.   Constitutional:       Appearance: Normal appearance.   Eyes:      Conjunctiva/sclera: Conjunctivae normal.   Cardiovascular:      Rate and Rhythm: Normal rate.   Pulmonary:      Effort: Pulmonary effort is normal.      Breath sounds: Normal breath sounds.   Musculoskeletal:      Cervical back: Normal range of motion.      Comments: Normal ROM bilateral    Skin:     General: Skin is warm and dry.   Neurological:      Mental Status: She is alert.      Cranial Nerves: Cranial nerve deficit: grossly intact.   Psychiatric:      Comments: Alert and orientated

## 2023-09-26 NOTE — TELEPHONE ENCOUNTER
Refill Routing Note   Medication(s) are not appropriate for processing by [facility] for the following reason(s):      Medication outside of protocol    [facility] action(s):  Route Care Due:  Labs due            Appointments  past 12m or future 3m with PCP    Date Provider   Last Visit   9/12/2023 Ulices Gaitan MD   Next Visit   12/19/2023 Ulices Gaitan MD   ED visits in past 90 days: 0        Note composed:8:33 AM 09/26/2023

## 2023-09-26 NOTE — TELEPHONE ENCOUNTER
Care Due:                  Date            Visit Type   Department     Provider  --------------------------------------------------------------------------------                                EP -                              [facility]  Last Visit: 09-      CARE ([facility])   TRINIDAD VIRAMONTES                               -                              [facility]  Next Visit: 12-      CARE ([facility])   TRINIDAD VIRAMONTES                                                            Last  Test          Frequency    Reason                     Performed    Due Date  --------------------------------------------------------------------------------    Cr..........  12 months..  DULoxetine...............  12- 12-    [facility] Embedded Care Due Messages. Reference number: [id number].   9/25/2023 7:56:49 PM CDT

## 2023-10-16 ENCOUNTER — TELEPHONE (OUTPATIENT)
Dept: FAMILY MEDICINE | Facility: CLINIC | Age: 73
End: 2023-10-16

## 2023-10-16 NOTE — TELEPHONE ENCOUNTER
She passed away at [place] on Saturday the 10/14.    States she passed and on their paper work they do not have a cause of death.     Cause of death:  Natural Cause, Cardiac Arrest      Called the   office, [phone].    Spoke with Destinee

## 2023-10-16 NOTE — TELEPHONE ENCOUNTER
----- Message from Martita Tee sent at 10/16/2023  9:21 AM CDT -----  Regarding: Advice  Contact: Yolande  Type: Needs Medical Advice  Who Called:  [facility]  Symptoms (please be specific):    How long has patient had these symptoms:    Pharmacy name and phone #:    Best Call Back Number: [phone]  Additional Information: Patient has passed away and Yolande is calling to see if Dr. Gaitan is signing the Death Certificate. Please call Yolande to advise. Thanks!

## 2023-10-17 ENCOUNTER — TELEPHONE (OUTPATIENT)
Dept: FAMILY MEDICINE | Facility: CLINIC | Age: 73
End: 2023-10-17
Payer: MEDICARE

## 2023-10-17 NOTE — TELEPHONE ENCOUNTER
----- Message from Kellie Orosco sent at 10/16/2023  4:17 PM CDT -----  Contact: [facility]  home  Type: Needs Medical Advice  Who Called:  jane torres [place]  Best Call Back Number: [phone]  Additional Information: the death certificate is ready for dr wills to sign in [facility]

## 2023-10-17 NOTE — LETTER
October 17, 2023    Family of Ms.Patricia LUDWIN Smith  [address]  [address]  [place] [address]             [facility]  [facility] [address]  Phone: [phone]  Fax: [phone] To the family of Ms. Smith:    Please accept our deepest condolences in regards to the recent death of your family member, Ms. Smith. She was a most remarkable person, and it was always a pleasure to see her. I hope that we were able to provide her with some relief during the time that she was under our care.     On behalf of myself and my staff, please also extend our condolences to all of your family. If there is anything else that we can do for you, please do not hesitate to contact us.    Sincerely,        Ulices Gaitan MD

## (undated) DEVICE — SHIELD COLLAGEN 12HR CORNEAL

## (undated) DEVICE — PACK EYE CUSTOM COVINGTON.

## (undated) DEVICE — SOL BETADINE 5%

## (undated) DEVICE — GOWN SMARTGOWN 3XL XLONG

## (undated) DEVICE — COVER OVERHEAD SURG LT BLUE

## (undated) DEVICE — SEE MEDLINE ITEM 157128

## (undated) DEVICE — SPONGE WEC CEL SPEARS

## (undated) DEVICE — DRAPE SURGICAL STERI INCISE

## (undated) DEVICE — SOL IRR BSS OPHTH 500ML STRL

## (undated) DEVICE — PACK OPHTHALMIC

## (undated) DEVICE — SYR DISP LL 5CC

## (undated) DEVICE — TOWEL OR NONABSORB ADH 17X26

## (undated) DEVICE — GLOVE BIOGEL SZ 8 1/2